# Patient Record
Sex: MALE | Race: WHITE | NOT HISPANIC OR LATINO | Employment: FULL TIME | URBAN - METROPOLITAN AREA
[De-identification: names, ages, dates, MRNs, and addresses within clinical notes are randomized per-mention and may not be internally consistent; named-entity substitution may affect disease eponyms.]

---

## 2017-01-13 ENCOUNTER — OFFICE VISIT (OUTPATIENT)
Dept: OPTOMETRY | Facility: CLINIC | Age: 42
End: 2017-01-13
Payer: OTHER GOVERNMENT

## 2017-01-13 DIAGNOSIS — H52.13 BILATERAL MYOPIA: Primary | ICD-10-CM

## 2017-01-13 PROCEDURE — 92015 DETERMINE REFRACTIVE STATE: CPT | Mod: ,,, | Performed by: OPTOMETRIST

## 2017-01-13 PROCEDURE — 99999 PR PBB SHADOW E&M-EST. PATIENT-LVL II: CPT | Mod: PBBFAC,,, | Performed by: OPTOMETRIST

## 2017-01-13 PROCEDURE — 92004 COMPRE OPH EXAM NEW PT 1/>: CPT | Mod: S$PBB,,, | Performed by: OPTOMETRIST

## 2017-01-13 PROCEDURE — 99212 OFFICE O/P EST SF 10 MIN: CPT | Mod: PBBFAC | Performed by: OPTOMETRIST

## 2017-01-13 NOTE — PROGRESS NOTES
HPI     Patient here today for comprehensive eye exam     Patient complains of being without his glasses for about 2 years, without   glasses pt has trouble with blurry distance vision, pt states no   complaints about his near vision.    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         Assessment /Plan     For exam results, see Encounter Report.    Bilateral myopia   New glasses prescription released, adaptation expected.    Eyeglass Final Rx     Eyeglass Final Rx      Sphere Cylinder Axis   Right -1.00 +1.00 090   Left -0.50 Sphere        Expiration Date:  1/14/2018                 RTC at pt's earliest convenience for DFE (continuation of 1/13/2017 exam) scheduled for 03/13/17

## 2017-01-13 NOTE — PATIENT INSTRUCTIONS
Thank you very much for coming in for your eye examination. It was a pleasure working with you today. Below is some information you might find helpful.     MYOPIA (NEAR-SIGHTEDNESS)     Myopia (near-sightedness), hyperopia (far-sightedness), and astigmatism (distorted vision) are known as refractive errors.     For proper eyesight, the cornea (the clear window in front of the eye) and the lens (behind the pupil) must properly focus or refract light onto the retina (at the back of the eye). If the length or shape of the eye is not ideal, the light may get focused too early or too late leaving a blurred image on the retina.    Myopia, or near-sightedness, is the ability to clearly see objects up close but not those at a distance.    Causes:  It is an inherited condition that usually starts in children between the ages of eight and twelve. Few factors outside of heredity affect this condition. Literacy is the main factor: in societies that do not read, myopia is very uncommon, while it is very common among those who read a lot. No one yet knows what there is about reading the leads to nearsightedness.  Using dim light, reading too much or nutritional deficiencies do not seem to impact it one way or the other.    Treatment:  Myopia is best treated with eyeglasses and contact lenses which compensate for the elongated shape of the eye allowing the light to focus properly on the retina. As children (and their eyes) grow through the teen years, the condition typically worsens and then levels off in adulthood. Recent research also indicates that the more time children spend outside, the less near-sighted they get. During this growing period, new eyeglasses may be needed as often as every six months to correct the problem.  Recent research indicates that the more time childrens spend outside, the less likely they are to develop myopia, and once it is there, time spent outside seems to slow its progression.    There is no  reliable way at this point to prevent myopia. There are a few things that help some people: we can use bifocals in adolescents to help reduce the increase in myopia. For Adults, it helps to look as far away as possible for just an instant, and to do this frequently when reading or using a computer.  The muscles of the eye have to work to see up close, and this helps reduce eyestrain which might be a factor in needing to change glasses prescription. *Refractive surgery* is available as a treatment for myopia but most medically trained eye doctors feel that eyes with simple myopia would best be treated with glasses or contact lenses.      Eliza Dang, OD

## 2017-01-13 NOTE — MR AVS SNAPSHOT
[facility]  [address]  [facility] [address]  Phone: [phone]                  [place]   2017 11:00 AM   Office Visit    Description:  Male : 1975   Provider:  Eliza Dang OD   Department:  [facility]           Reason for Visit     Eye Exam                To Do List           Goals (5 Years of Data)     None      [facility] On [facility]     [facility] On Call Nurse Care Line -  Assistance  Registered nurses in the [facility] Call Center provide clinical advisement, health education, appointment booking, and other advisory services.  Call for this free service at [phone].             Medications           Message regarding Medications     Verify the changes and/or additions to your medication regime listed below are the same as discussed with your clinician today.  If any of these changes or additions are incorrect, please notify your healthcare provider.        STOP taking these medications     ibuprofen (ADVIL,MOTRIN) 800 MG tablet Take 1 tablet (800 mg total) by mouth every 6 (six) hours as needed for Pain.           Verify that the below list of medications is an accurate representation of the medications you are currently taking.  If none reported, the list may be blank. If incorrect, please contact your healthcare provider. Carry this list with you in case of emergency.           Current Medications            Clinical Reference Information           Allergies as of 2017     No Known Allergies      Immunizations Administered on Date of Encounter - 2017     None      Instructions    Thank you very much for coming in for your eye examination. It was a pleasure working with you today. Below is some information you might find helpful.     MYOPIA (NEAR-SIGHTEDNESS)     Myopia (near-sightedness), hyperopia (far-sightedness), and astigmatism (distorted vision) are known as refractive errors.     For proper eyesight, the  cornea (the clear window in front of the eye) and the lens (behind the pupil) must properly focus or refract light onto the retina (at the back of the eye). If the length or shape of the eye is not ideal, the light may get focused too early or too late leaving a blurred image on the retina.    Myopia, or near-sightedness, is the ability to clearly see objects up close but not those at a distance.    Causes:  It is an inherited condition that usually starts in children between the ages of eight and twelve. Few factors outside of heredity affect this condition. Literacy is the main factor: in societies that do not read, myopia is very uncommon, while it is very common among those who read a lot. No one yet knows what there is about reading the leads to nearsightedness.  Using dim light, reading too much or nutritional deficiencies do not seem to impact it one way or the other.    Treatment:  Myopia is best treated with eyeglasses and contact lenses which compensate for the elongated shape of the eye allowing the light to focus properly on the retina. As children (and their eyes) grow through the teen years, the condition typically worsens and then levels off in adulthood. Recent research also indicates that the more time children spend outside, the less near-sighted they get. During this growing period, new eyeglasses may be needed as often as every six months to correct the problem.  Recent research indicates that the more time childrens spend outside, the less likely they are to develop myopia, and once it is there, time spent outside seems to slow its progression.    There is no reliable way at this point to prevent myopia. There are a few things that help some people: we can use bifocals in adolescents to help reduce the increase in myopia. For Adults, it helps to look as far away as possible for just an instant, and to do this frequently when reading or using a computer.  The muscles of the eye have to work to  see up close, and this helps reduce eyestrain which might be a factor in needing to change glasses prescription. *Refractive surgery* is available as a treatment for myopia but most medically trained eye doctors feel that eyes with simple myopia would best be treated with glasses or contact lenses.      Eliza Dang, OD        Smoking Cessation     If you would like to quit smoking:   You may be eligible for free services if you are a [place] resident and started smoking cigarettes before September 1, 1988.  Call the [employer] ([employer]) toll free at (826) 147-7518 or ([phone] 099-7207.   Call 7-508-QUIT-NOW if you do not meet the above criteria.

## 2017-03-15 ENCOUNTER — PATIENT MESSAGE (OUTPATIENT)
Dept: OPTOMETRY | Facility: CLINIC | Age: 42
End: 2017-03-15

## 2018-08-13 ENCOUNTER — CLINICAL SUPPORT (OUTPATIENT)
Dept: URGENT CARE | Facility: CLINIC | Age: 43
End: 2018-08-13

## 2018-08-13 DIAGNOSIS — Z02.83 ENCOUNTER FOR DRUG SCREENING: Primary | ICD-10-CM

## 2018-08-13 LAB
CTP QC/QA: YES
POC 5 PANEL DRUG SCREEN: NEGATIVE

## 2018-08-13 PROCEDURE — 80305 DRUG TEST PRSMV DIR OPT OBS: CPT | Mod: QW,S$GLB,, | Performed by: PREVENTIVE MEDICINE

## 2018-09-14 ENCOUNTER — HOSPITAL ENCOUNTER (EMERGENCY)
Facility: HOSPITAL | Age: 43
Discharge: HOME | End: 2018-09-14
Attending: EMERGENCY MEDICINE
Payer: OTHER GOVERNMENT

## 2018-09-14 VITALS
HEIGHT: 67 IN | WEIGHT: 180 LBS | DIASTOLIC BLOOD PRESSURE: 75 MMHG | TEMPERATURE: 98 F | SYSTOLIC BLOOD PRESSURE: 132 MMHG | BODY MASS INDEX: 28.25 KG/M2 | HEART RATE: 70 BPM | OXYGEN SATURATION: 99 % | RESPIRATION RATE: 16 BRPM

## 2018-09-14 DIAGNOSIS — S39.012A STRAIN OF LUMBAR REGION, INITIAL ENCOUNTER: Primary | ICD-10-CM

## 2018-09-14 PROCEDURE — 25000003 PHARM REV CODE 250: Performed by: PHYSICIAN ASSISTANT

## 2018-09-14 PROCEDURE — 99283 EMERGENCY DEPT VISIT LOW MDM: CPT

## 2018-09-14 RX ORDER — LIDOCAINE 50 MG/G
1 PATCH TOPICAL DAILY
Qty: 8 PATCH | Refills: 0 | Status: SHIPPED | OUTPATIENT
Start: 2018-09-14 | End: 2018-11-04

## 2018-09-14 RX ORDER — IBUPROFEN 600 MG/1
600 TABLET ORAL
Status: COMPLETED | OUTPATIENT
Start: 2018-09-14 | End: 2018-09-14

## 2018-09-14 RX ORDER — IBUPROFEN 600 MG/1
600 TABLET ORAL EVERY 6 HOURS PRN
Qty: 20 TABLET | Refills: 0 | Status: SHIPPED | OUTPATIENT
Start: 2018-09-14 | End: 2018-11-04

## 2018-09-14 RX ADMIN — IBUPROFEN 600 MG: 600 TABLET ORAL at 07:09

## 2018-09-14 NOTE — ED TRIAGE NOTES
Patient reports mid to lower back pain after slipping and falling last night.  Denies hitting head or LOC.

## 2018-09-14 NOTE — ED PROVIDER NOTES
Encounter Date: 9/14/2018       History     Chief Complaint   Patient presents with    Back Pain     Slipped last night injuring his middle and lower back.  Denies LOC     This is a 42-year-old male with no significant medical history who complains mid to lower back pain since last night after mechanical fall.  Patient was walking in his home when he slipped falling backwards landing on his lower back.  He denies hitting head or losing consciousness.  He reports pain primarily to the mid to lower back, and mostly on the left side.  The pain is characterized by an aching that is constant and nonradiating.  The pain is worse with movement.  He denies abdominal pain, lower extremity weakness or numbness, urinary or bowel retention/incontinence.  No history of osteoporosis or other bone disorders.          Review of patient's allergies indicates:  No Known Allergies  History reviewed. No pertinent past medical history.  Past Surgical History:   Procedure Laterality Date    CYST REMOVAL N/A     near heart     Family History   Problem Relation Age of Onset    Stroke Mother     Cancer Father     Diabetes Father     Hypertension Father     Cancer Maternal Grandmother     Cancer Maternal Grandfather     Cancer Paternal Grandmother     Cancer Paternal Grandfather      Social History     Tobacco Use    Smoking status: Current Every Day Smoker   Substance Use Topics    Alcohol use: No    Drug use: No     Review of Systems   Constitutional: Negative for fever.   HENT: Negative for sore throat.    Respiratory: Negative for shortness of breath.    Cardiovascular: Negative for chest pain.   Gastrointestinal: Negative for abdominal pain and nausea.   Genitourinary: Negative for dysuria.   Musculoskeletal: Positive for back pain.   Skin: Negative for rash.   Neurological: Negative for syncope, weakness and numbness.   Hematological: Does not bruise/bleed easily.       Physical Exam     Initial Vitals [09/14/18 0711]   BP  Pulse Resp Temp SpO2   132/75 70 16 98.3 °F (36.8 °C) 99 %      MAP       --         Physical Exam    Vitals reviewed.  Constitutional: He appears well-developed and well-nourished. He is not diaphoretic. No distress.   HENT:   Head: Normocephalic and atraumatic.   Right Ear: External ear normal.   Left Ear: External ear normal.   Nose: Nose normal.   Eyes: Conjunctivae are normal. No scleral icterus.   Neck: Normal range of motion. Neck supple.   Cardiovascular: Normal rate, regular rhythm and intact distal pulses.   Pulmonary/Chest: No respiratory distress.   Musculoskeletal: Normal range of motion. He exhibits tenderness.   Mild tenderness to the left paraspinal region of the lower thoracic and lumbar regions.  No midline tenderness, deformity, or step-offs.   Neurological: He is alert and oriented to person, place, and time. He has normal strength. No sensory deficit.   Skin: Skin is warm and dry. No rash noted. No erythema.         ED Course   Procedures  Labs Reviewed - No data to display       Imaging Results    None          Medical Decision Making:   ED Management:  42 year old patient presenting 2/2 back pain most consistent with musculoskeletal strain vs contusion from mechanical fall.    Low suspicion for acute cord compression or cauda equina at this time, given presentation and symptoms, including epidural abscess or hematoma. Patient has no history of malignancy, active or distant history.  Patient has no unexplained weight loss. No recent fevers, rigors, malaise, or recent infection.  No history of IVDU or skin-popping.  Patient does not have any history concerning for saddle anesthesia/perianal sensory loss or complaining of decreased rectal tone. Patient does not have urinary retention or inability to control urine from overflow.  Patient has no tenderness overlying spinous process. Patient has no focal weakness on examination. Distally neurovascuarly intact.  Discussed pain control, physical  therapy and follow up with PMD. Cautious return precautions discussed w/ full understanding                Attending Attestation:     Physician Attestation Statement for NP/PA:   I reviewed the chart but I did not personally examine the patient. The face to face encounter was performed by the NP/PA.                     Clinical Impression:   The encounter diagnosis was Strain of lumbar region, initial encounter.                             Jayy Landry PA-C  09/14/18 0731       David Crowell MD  10/18/18 1926

## 2018-11-04 ENCOUNTER — HOSPITAL ENCOUNTER (EMERGENCY)
Facility: HOSPITAL | Age: 43
Discharge: HOME | End: 2018-11-04
Attending: EMERGENCY MEDICINE
Payer: OTHER GOVERNMENT

## 2018-11-04 VITALS
WEIGHT: 180 LBS | DIASTOLIC BLOOD PRESSURE: 81 MMHG | SYSTOLIC BLOOD PRESSURE: 120 MMHG | TEMPERATURE: 98 F | RESPIRATION RATE: 11 BRPM | HEIGHT: 67 IN | BODY MASS INDEX: 28.25 KG/M2 | OXYGEN SATURATION: 100 % | HEART RATE: 64 BPM

## 2018-11-04 DIAGNOSIS — R07.89 CHEST WALL PAIN: Primary | ICD-10-CM

## 2018-11-04 DIAGNOSIS — R07.9 CHEST PAIN: ICD-10-CM

## 2018-11-04 LAB
ALBUMIN SERPL BCP-MCNC: 3.9 G/DL
ALP SERPL-CCNC: 65 U/L
ALT SERPL W/O P-5'-P-CCNC: 18 U/L
ANION GAP SERPL CALC-SCNC: 11 MMOL/L
AST SERPL-CCNC: 18 U/L
BASOPHILS # BLD AUTO: 0.03 K/UL
BASOPHILS NFR BLD: 0.3 %
BILIRUB SERPL-MCNC: 1 MG/DL
BNP SERPL-MCNC: <10 PG/ML
BUN SERPL-MCNC: 12 MG/DL
CALCIUM SERPL-MCNC: 9 MG/DL
CHLORIDE SERPL-SCNC: 107 MMOL/L
CO2 SERPL-SCNC: 21 MMOL/L
CREAT SERPL-MCNC: 0.9 MG/DL
DIFFERENTIAL METHOD: ABNORMAL
EOSINOPHIL # BLD AUTO: 0.2 K/UL
EOSINOPHIL NFR BLD: 1.7 %
ERYTHROCYTE [DISTWIDTH] IN BLOOD BY AUTOMATED COUNT: 13.8 %
EST. GFR  (AFRICAN AMERICAN): >60 ML/MIN/1.73 M^2
EST. GFR  (NON AFRICAN AMERICAN): >60 ML/MIN/1.73 M^2
GLUCOSE SERPL-MCNC: 86 MG/DL
HCT VFR BLD AUTO: 43.8 %
HGB BLD-MCNC: 15.5 G/DL
LYMPHOCYTES # BLD AUTO: 1.5 K/UL
LYMPHOCYTES NFR BLD: 15.8 %
MCH RBC QN AUTO: 31.5 PG
MCHC RBC AUTO-ENTMCNC: 35.4 G/DL
MCV RBC AUTO: 89 FL
MONOCYTES # BLD AUTO: 1.2 K/UL
MONOCYTES NFR BLD: 12.6 %
NEUTROPHILS # BLD AUTO: 6.7 K/UL
NEUTROPHILS NFR BLD: 69.4 %
PLATELET # BLD AUTO: 272 K/UL
PMV BLD AUTO: 10.2 FL
POTASSIUM SERPL-SCNC: 3.8 MMOL/L
PROT SERPL-MCNC: 6.4 G/DL
RBC # BLD AUTO: 4.92 M/UL
SODIUM SERPL-SCNC: 139 MMOL/L
TROPONIN I SERPL DL<=0.01 NG/ML-MCNC: <0.006 NG/ML
TROPONIN I SERPL DL<=0.01 NG/ML-MCNC: <0.006 NG/ML
WBC # BLD AUTO: 9.62 K/UL

## 2018-11-04 PROCEDURE — 93010 ELECTROCARDIOGRAM REPORT: CPT | Mod: ,,, | Performed by: INTERNAL MEDICINE

## 2018-11-04 PROCEDURE — 96374 THER/PROPH/DIAG INJ IV PUSH: CPT

## 2018-11-04 PROCEDURE — 83880 ASSAY OF NATRIURETIC PEPTIDE: CPT

## 2018-11-04 PROCEDURE — 99284 EMERGENCY DEPT VISIT MOD MDM: CPT | Mod: 25

## 2018-11-04 PROCEDURE — 96375 TX/PRO/DX INJ NEW DRUG ADDON: CPT

## 2018-11-04 PROCEDURE — 80053 COMPREHEN METABOLIC PANEL: CPT

## 2018-11-04 PROCEDURE — 84484 ASSAY OF TROPONIN QUANT: CPT

## 2018-11-04 PROCEDURE — 63600175 PHARM REV CODE 636 W HCPCS: Performed by: EMERGENCY MEDICINE

## 2018-11-04 PROCEDURE — 93005 ELECTROCARDIOGRAM TRACING: CPT

## 2018-11-04 PROCEDURE — 85025 COMPLETE CBC W/AUTO DIFF WBC: CPT

## 2018-11-04 RX ORDER — KETOROLAC TROMETHAMINE 30 MG/ML
15 INJECTION, SOLUTION INTRAMUSCULAR; INTRAVENOUS
Status: COMPLETED | OUTPATIENT
Start: 2018-11-04 | End: 2018-11-04

## 2018-11-04 RX ORDER — IBUPROFEN 600 MG/1
600 TABLET ORAL EVERY 6 HOURS PRN
Qty: 20 TABLET | Refills: 0 | Status: SHIPPED | OUTPATIENT
Start: 2018-11-04 | End: 2018-11-10

## 2018-11-04 RX ORDER — ASPIRIN 325 MG
325 TABLET ORAL
Status: DISCONTINUED | OUTPATIENT
Start: 2018-11-04 | End: 2018-11-05 | Stop reason: HOSPADM

## 2018-11-04 RX ADMIN — LORAZEPAM 0.5 MG: 2 INJECTION INTRAMUSCULAR; INTRAVENOUS at 10:11

## 2018-11-04 RX ADMIN — KETOROLAC TROMETHAMINE 15 MG: 30 INJECTION, SOLUTION INTRAMUSCULAR at 09:11

## 2018-11-05 NOTE — ED NOTES
"Prior to medication administration patient was asked :"How is your pain?". Patient stated "Worse after you pushed on it". Current pain level is 8/10.  "

## 2018-11-05 NOTE — ED NOTES
Patient reports having an increase in left sided chest pain. Patient appears anxious. Dr. Lefort notified. New orders received.

## 2018-11-05 NOTE — ED TRIAGE NOTES
Patient presents to the ED with reports of having chest pain while at work. Patient denies any fall or trauma. Reports pain is felt to the left sided chest area. Pain is able to be produced with palpation to the area. Denies any fever, cough, or congestion. Patient reported having an episode of shortness of breath yesterday that has resolved.     Review of patient's allergies indicates:  No Known Allergies     Patient has verified the spelling of their name and  on armband.   APPEARANCE: Patient is alert, calm, oriented x 4, and does not appear distressed.  SKIN: Skin is normal for race, warm, and dry. Normal skin turgor and mucous membranes moist.  CARDIAC: Normal rate and rhythm, no murmur heard. +Chest pain.  RESPIRATORY:Normal rate and effort. Breath sounds clear bilaterally throughout chest. Respirations are equal and unlabored.    GASTRO: Bowel sounds normal, abdomen is soft, no tenderness, and no abdominal distention.  MUSCLE: Full ROM. Tenderness to the left chest wall. No obvious deformity.  PERIPHERAL VASCULAR: peripheral pulses present. Normal cap refill. No edema. Warm to touch.  MENTAL STATUS: awake, alert and aware of environment.

## 2018-11-05 NOTE — ED PROVIDER NOTES
Encounter Date: 11/4/2018    SCRIBE #1 NOTE: I, Sowmya James, am scribing for, and in the presence of,  Dr. Lefort. I have scribed the entire note.       History     Chief Complaint   Patient presents with    Chest Pain     Sudden onset sharp, left sided chest pain x 1 hour with no h/o injury. Pain is reproducable with palpation. On arrival, awake, alert, oriented. States pain eased with NTG but continues painful with palpation.      A 43year-old male presents to the ED complaining of intermittent left anterior chest wall pain that onset suddenly at 1900. Patient was asymptomatic prior to onset, he denies any recent cough/cold symptoms, injury or trauma. Pain is exacerbated on movement and deep breathing. Symptoms relieved somewhat after NTG, but still reproducible on palpation. He denies any associated SOB, leg swelling, or past cardiac history or family history of cardiac problems. Patient has a history of daily tabacco use.             Review of patient's allergies indicates:  No Known Allergies  History reviewed. No pertinent past medical history.  Past Surgical History:   Procedure Laterality Date    CYST REMOVAL N/A     near heart     Family History   Problem Relation Age of Onset    Stroke Mother     Cancer Father     Diabetes Father     Hypertension Father     Cancer Maternal Grandmother     Cancer Maternal Grandfather     Cancer Paternal Grandmother     Cancer Paternal Grandfather      Social History     Tobacco Use    Smoking status: Current Every Day Smoker   Substance Use Topics    Alcohol use: No    Drug use: No     Review of Systems   Constitutional: Negative for chills and fever.   HENT: Negative for congestion, ear pain, rhinorrhea and sore throat.    Respiratory: Positive for chest tightness. Negative for cough, shortness of breath and wheezing.    Cardiovascular: Negative for chest pain and palpitations.   Gastrointestinal: Negative for abdominal pain, diarrhea, nausea and vomiting.    Genitourinary: Negative for dysuria and hematuria.   Musculoskeletal: Negative for back pain, myalgias and neck pain.   Skin: Negative for rash.   Neurological: Negative for dizziness, weakness, light-headedness and headaches.   Psychiatric/Behavioral: Negative for confusion.       Physical Exam     Initial Vitals [11/04/18 2032]   BP Pulse Resp Temp SpO2   110/74 70 16 97.9 °F (36.6 °C) 98 %      MAP       --         Physical Exam    Nursing note and vitals reviewed.  Constitutional: He appears well-developed and well-nourished. No distress.   HENT:   Head: Normocephalic and atraumatic.   Mouth/Throat: Oropharynx is clear and moist.   Eyes: EOM are normal. No foreign body present in the right eye. Right conjunctiva is injected.   Right medial scleral injection with limbic sparing   Cardiovascular: Normal rate, regular rhythm, normal heart sounds and intact distal pulses.   Pulmonary/Chest: Breath sounds normal. No respiratory distress.   Point tenderness along left lateral ribs    Abdominal: Soft. He exhibits no distension. There is no tenderness.   Musculoskeletal: Normal range of motion. He exhibits no edema or tenderness.   Neurological: He is alert and oriented to person, place, and time. He has normal strength.   Skin: Skin is warm and dry.         ED Course   Procedures  Labs Reviewed   CBC W/ AUTO DIFFERENTIAL - Abnormal; Notable for the following components:       Result Value    MCH 31.5 (*)     Mono # 1.2 (*)     Lymph% 15.8 (*)     All other components within normal limits   COMPREHENSIVE METABOLIC PANEL - Abnormal; Notable for the following components:    CO2 21 (*)     All other components within normal limits   TROPONIN I   B-TYPE NATRIURETIC PEPTIDE     EKG Readings: (Independently Interpreted)   Rhythm: Normal Sinus Rhythm. Heart Rate: 70. Ectopy: No Ectopy. Conduction: Normal. ST Segments: Normal ST Segments. T Waves: Normal. Clinical Impression: Normal Sinus Rhythm       X-Rays:   Independently  Interpreted Readings:   Other Readings:  Reviewed by myself, read by radiology.      Imaging Results          X-Ray Chest AP Portable (Final result)  Result time 11/04/18 21:22:56    Final result by Yony Aldana MD (11/04/18 21:22:56)                 Impression:      1. No acute cardiopulmonary process.      Electronically signed by: Yony Aldana MD  Date:    11/04/2018  Time:    21:22             Narrative:    EXAMINATION:  XR CHEST AP PORTABLE    CLINICAL HISTORY:  Chest Pain;    TECHNIQUE:  Single frontal view of the chest was performed.    COMPARISON:  05/29/2015, 10/31/2011    FINDINGS:  The cardiomediastinal silhouette is not enlarged.  There is no pleural effusion.  The trachea is midline.  The lungs are symmetrically expanded bilaterally without evidence of acute parenchymal process. No large focal consolidation seen.  There is no pneumothorax.  The osseous structures are unremarkable.                               Medical Decision Making:   Differential Diagnosis:   MI, CWP, PE, PTX, PNA, GERD  Clinical Tests:   Lab Tests: Ordered and Reviewed  Radiological Study: Reviewed and Ordered  Medical Tests: Ordered and Reviewed  ED Management:  Focal chest wall tenderness, severe to palpation.  Cardiac workup unremarkable. PERC met.  Discussed symptomatic care, expected course of injury, and return precautions.                      Clinical Impression:     1. Chest wall pain    2. Chest pain            Scribe Attestation I, Dr. Guy Lefort, personally performed the services described in this documentation. All medical record entries made by the scribe were at my direction and in my presence. I have reviewed the chart and agree that the record reflects my personal performance and is accurate and complete. Guy Lefort, MD.  5:10 AM 11/06/2018                    Guy J. Lefort, MD  11/06/18 0041

## 2018-11-10 ENCOUNTER — HOSPITAL ENCOUNTER (EMERGENCY)
Facility: HOSPITAL | Age: 43
Discharge: HOME | End: 2018-11-10
Attending: EMERGENCY MEDICINE
Payer: OTHER GOVERNMENT

## 2018-11-10 VITALS
OXYGEN SATURATION: 98 % | WEIGHT: 180 LBS | DIASTOLIC BLOOD PRESSURE: 74 MMHG | SYSTOLIC BLOOD PRESSURE: 111 MMHG | BODY MASS INDEX: 28.25 KG/M2 | RESPIRATION RATE: 19 BRPM | HEART RATE: 61 BPM | TEMPERATURE: 99 F | HEIGHT: 67 IN

## 2018-11-10 DIAGNOSIS — R10.13 EPIGASTRIC PAIN: Primary | ICD-10-CM

## 2018-11-10 DIAGNOSIS — R07.9 CHEST PAIN: ICD-10-CM

## 2018-11-10 LAB
ALBUMIN SERPL BCP-MCNC: 3.8 G/DL
ALP SERPL-CCNC: 59 U/L
ALT SERPL W/O P-5'-P-CCNC: 17 U/L
ANION GAP SERPL CALC-SCNC: 7 MMOL/L
AST SERPL-CCNC: 17 U/L
BASOPHILS # BLD AUTO: 0.03 K/UL
BASOPHILS NFR BLD: 0.4 %
BILIRUB SERPL-MCNC: 1.1 MG/DL
BUN SERPL-MCNC: 13 MG/DL
CALCIUM SERPL-MCNC: 9.3 MG/DL
CHLORIDE SERPL-SCNC: 106 MMOL/L
CO2 SERPL-SCNC: 24 MMOL/L
CREAT SERPL-MCNC: 0.8 MG/DL
DIFFERENTIAL METHOD: ABNORMAL
EOSINOPHIL # BLD AUTO: 0.3 K/UL
EOSINOPHIL NFR BLD: 3.7 %
ERYTHROCYTE [DISTWIDTH] IN BLOOD BY AUTOMATED COUNT: 13.8 %
EST. GFR  (AFRICAN AMERICAN): >60 ML/MIN/1.73 M^2
EST. GFR  (NON AFRICAN AMERICAN): >60 ML/MIN/1.73 M^2
GLUCOSE SERPL-MCNC: 87 MG/DL
HCT VFR BLD AUTO: 43.7 %
HGB BLD-MCNC: 15.9 G/DL
LIPASE SERPL-CCNC: 26 U/L
LYMPHOCYTES # BLD AUTO: 1.9 K/UL
LYMPHOCYTES NFR BLD: 26.3 %
MCH RBC QN AUTO: 32.2 PG
MCHC RBC AUTO-ENTMCNC: 36.4 G/DL
MCV RBC AUTO: 89 FL
MONOCYTES # BLD AUTO: 0.8 K/UL
MONOCYTES NFR BLD: 10.5 %
NEUTROPHILS # BLD AUTO: 4.3 K/UL
NEUTROPHILS NFR BLD: 59.1 %
PLATELET # BLD AUTO: 263 K/UL
PMV BLD AUTO: 11.1 FL
POTASSIUM SERPL-SCNC: 4 MMOL/L
PROT SERPL-MCNC: 6.5 G/DL
RBC # BLD AUTO: 4.94 M/UL
SODIUM SERPL-SCNC: 137 MMOL/L
TROPONIN I SERPL DL<=0.01 NG/ML-MCNC: <0.006 NG/ML
WBC # BLD AUTO: 7.33 K/UL

## 2018-11-10 PROCEDURE — 80053 COMPREHEN METABOLIC PANEL: CPT

## 2018-11-10 PROCEDURE — 93010 ELECTROCARDIOGRAM REPORT: CPT | Mod: ,,, | Performed by: INTERNAL MEDICINE

## 2018-11-10 PROCEDURE — 83690 ASSAY OF LIPASE: CPT

## 2018-11-10 PROCEDURE — 63600175 PHARM REV CODE 636 W HCPCS: Performed by: EMERGENCY MEDICINE

## 2018-11-10 PROCEDURE — 93005 ELECTROCARDIOGRAM TRACING: CPT

## 2018-11-10 PROCEDURE — 99285 EMERGENCY DEPT VISIT HI MDM: CPT | Mod: 25

## 2018-11-10 PROCEDURE — 96375 TX/PRO/DX INJ NEW DRUG ADDON: CPT

## 2018-11-10 PROCEDURE — 85025 COMPLETE CBC W/AUTO DIFF WBC: CPT

## 2018-11-10 PROCEDURE — 25000003 PHARM REV CODE 250: Performed by: EMERGENCY MEDICINE

## 2018-11-10 PROCEDURE — 96374 THER/PROPH/DIAG INJ IV PUSH: CPT

## 2018-11-10 PROCEDURE — 84484 ASSAY OF TROPONIN QUANT: CPT

## 2018-11-10 RX ORDER — MIDAZOLAM HYDROCHLORIDE 1 MG/ML
4 INJECTION INTRAMUSCULAR; INTRAVENOUS
Status: DISCONTINUED | OUTPATIENT
Start: 2018-11-10 | End: 2018-11-10

## 2018-11-10 RX ORDER — FAMOTIDINE 20 MG/1
20 TABLET, FILM COATED ORAL 2 TIMES DAILY
Qty: 60 TABLET | Refills: 0 | Status: SHIPPED | OUTPATIENT
Start: 2018-11-10 | End: 2018-11-12 | Stop reason: DRUGHIGH

## 2018-11-10 RX ORDER — MORPHINE SULFATE 4 MG/ML
5 INJECTION, SOLUTION INTRAMUSCULAR; INTRAVENOUS
Status: COMPLETED | OUTPATIENT
Start: 2018-11-10 | End: 2018-11-10

## 2018-11-10 RX ORDER — ONDANSETRON 2 MG/ML
4 INJECTION INTRAMUSCULAR; INTRAVENOUS
Status: COMPLETED | OUTPATIENT
Start: 2018-11-10 | End: 2018-11-10

## 2018-11-10 RX ORDER — ONDANSETRON 4 MG/1
4 TABLET, FILM COATED ORAL EVERY 6 HOURS
Qty: 12 TABLET | Refills: 0 | Status: SHIPPED | OUTPATIENT
Start: 2018-11-10 | End: 2019-01-07 | Stop reason: ALTCHOICE

## 2018-11-10 RX ORDER — ALUMINUM HYDROXIDE, MAGNESIUM HYDROXIDE, AND SIMETHICONE 2400; 240; 2400 MG/30ML; MG/30ML; MG/30ML
10 SUSPENSION ORAL EVERY 6 HOURS PRN
Qty: 335 ML | Refills: 1 | Status: SHIPPED | OUTPATIENT
Start: 2018-11-10 | End: 2018-11-12 | Stop reason: DRUGHIGH

## 2018-11-10 RX ADMIN — ONDANSETRON 4 MG: 2 INJECTION INTRAMUSCULAR; INTRAVENOUS at 04:11

## 2018-11-10 RX ADMIN — MORPHINE SULFATE 5 MG: 4 INJECTION, SOLUTION INTRAMUSCULAR; INTRAVENOUS at 05:11

## 2018-11-10 RX ADMIN — LIDOCAINE HYDROCHLORIDE: 20 SOLUTION ORAL; TOPICAL at 04:11

## 2018-11-10 NOTE — ED PROVIDER NOTES
"Encounter Date: 11/10/2018    SCRIBE #1 NOTE: I, Alena Santo, am scribing for, and in the presence of,  Prosper Negrete Jr., MD. I have scribed the following portions of the note - Other sections scribed: HPI and ROS.       History     Chief Complaint   Patient presents with    Chest Pain     x5 days. went to urgent care and was sent to this ER for "elevation" on EKG    Abdominal Pain     epigastric abd pain      CC: Chest Pain    HPI: This 43 y.o male presents to the ED for an evaluation of acute onset, intermittent chest pain for the past several days.  Patient also reports of periumbilical abdominal pain for the past 2-3 days.  He reports having a cough and intermittent shortness of breath.  Patient denies emesis, diarrhea, blood in diarrhea, constipation, or any other associated symptoms.  No prior tx.  No alleviating factors.  He was sent to this ED after being evaluated at Urgent Care for an abnormal EKG.        The history is provided by the patient. No  was used.     Review of patient's allergies indicates:  No Known Allergies  History reviewed. No pertinent past medical history.  Past Surgical History:   Procedure Laterality Date    CYST REMOVAL N/A     near heart     Family History   Problem Relation Age of Onset    Stroke Mother     Cancer Father     Diabetes Father     Hypertension Father     Cancer Maternal Grandmother     Cancer Maternal Grandfather     Cancer Paternal Grandmother     Cancer Paternal Grandfather      Social History     Tobacco Use    Smoking status: Current Every Day Smoker   Substance Use Topics    Alcohol use: No    Drug use: Yes     Frequency: 3.0 times per week     Types: Marijuana     Review of Systems   Constitutional: Negative for chills and fever.   HENT: Negative for ear pain and sore throat.    Eyes: Negative for pain.   Respiratory: Positive for cough and shortness of breath.    Cardiovascular: Positive for chest pain.   Gastrointestinal: " Positive for abdominal pain and nausea. Negative for blood in stool, constipation, diarrhea and vomiting.   Genitourinary: Negative for dysuria.   Musculoskeletal: Negative for back pain.        (-) arm or leg problems   Skin: Negative for rash.   Neurological: Negative for headaches.       Physical Exam     Initial Vitals   BP Pulse Resp Temp SpO2   11/10/18 1555 11/10/18 1602 11/10/18 1602 11/10/18 1602 11/10/18 1602   132/82 64 18 98.5 °F (36.9 °C) 98 %      MAP       --                Physical Exam    Nursing note and vitals reviewed.  Constitutional: He appears well-developed and well-nourished. He is not diaphoretic. No distress.   HENT:   Head: Normocephalic and atraumatic.   Right Ear: External ear normal.   Left Ear: External ear normal.   Nose: Nose normal.   Mouth/Throat: Oropharynx is clear and moist.   Eyes: Conjunctivae and EOM are normal. Pupils are equal, round, and reactive to light. Right eye exhibits no discharge. Left eye exhibits no discharge. No scleral icterus.   Neck: Normal range of motion. Neck supple. No JVD present.   Cardiovascular: Normal rate, regular rhythm, normal heart sounds and intact distal pulses. Exam reveals no gallop and no friction rub.    No murmur heard.  Pulmonary/Chest: Breath sounds normal. No stridor. No respiratory distress. He has no wheezes. He has no rhonchi. He has no rales. He exhibits no tenderness.   Abdominal: Soft. Bowel sounds are normal. He exhibits no distension and no mass. There is no tenderness. There is no rebound and no guarding.   Mild discomfort in the epigastrium only.  No right upper quadrant or any lower abdominal tenderness.  Adams's sign is negative.   Musculoskeletal: Normal range of motion. He exhibits no edema or tenderness.   Neurological: He is alert and oriented to person, place, and time. He has normal strength. No cranial nerve deficit or sensory deficit.   Skin: Skin is warm and dry. No rash noted. No erythema. No pallor.    Psychiatric: He has a normal mood and affect. His behavior is normal. Judgment and thought content normal.         ED Course   Procedures  Labs Reviewed   CBC W/ AUTO DIFFERENTIAL - Abnormal; Notable for the following components:       Result Value    MCH 32.2 (*)     MCHC 36.4 (*)     All other components within normal limits   COMPREHENSIVE METABOLIC PANEL - Abnormal; Notable for the following components:    Total Bilirubin 1.1 (*)     Anion Gap 7 (*)     All other components within normal limits   LIPASE   TROPONIN I     EKG Readings: (Independently Interpreted)   Initial Reading: No STEMI.   EKG reviewed and interpreted by me shows sinus rhythm at a rate of 64.  P.r., QRS, QT intervals within normal limits.  There is normal axis.  There is normal R-wave progression.  There is early repolarization/J-point elevation in the inferior and lateral leads.       Imaging Results          X-Ray Chest PA And Lateral (Final result)  Result time 11/10/18 16:45:25    Final result by Kushal Gabriel MD (11/10/18 16:45:25)                 Impression:      No acute findings.      Electronically signed by: Kushal Gabriel MD  Date:    11/10/2018  Time:    16:45             Narrative:    EXAMINATION:  XR CHEST PA AND LATERAL    CLINICAL HISTORY:  Chest pain, unspecified    TECHNIQUE:  PA and lateral views of the chest were performed.    COMPARISON:  11/04/2018    FINDINGS:  The cardiac and mediastinal silhouettes appear within normal limits.   The lungs are clear bilaterally.  No acute osseous findings demonstrated.                                 Medical Decision Making:   ED Management:  This is the emergent evaluation of a 43-year-old male presents to the emergency department for evaluation of epigastric pain with radiation to the left upper quadrant as well as nausea for the past several days.  Patient also complains of intermittent chest discomfort.  Patient was sent here from urgent care due to concern about ST  elevation findings in his EKG.  Differential diagnosis at the time of initial evaluation included, but was not limited to:  Gastritis, peptic ulcer disease, acute pancreatitis, pericarditis, myocarditis, pneumonia, pneumothorax.      Chest x-ray, EKG, and laboratory evaluation were all reassuring.  I believe that the concerned about ST elevation was actually early repolarization and J-point elevation.  I do not suspect acute myocardial infarction or acute coronary syndrome.  I also do not suspect is perforated ulcer, gallbladder disease, or pancreatitis.  This patient is safe and stable for discharge with symptomatic treatment of what is likely gastritis versus early peptic ulcer disease.  He is advised to follow up with his PCP within 1 week and return for any new or worsening symptoms.            Scribe Attestation:   Scribe #1: I performed the above scribed service and the documentation accurately describes the services I performed. I attest to the accuracy of the note.    Attending Attestation:           Physician Attestation for Scribe:  Physician Attestation Statement for Scribe #1: I, Prosper Negrete Jr., MD, reviewed documentation, as scribed by Alena Santo in my presence, and it is both accurate and complete.                    Clinical Impression:   The primary encounter diagnosis was Epigastric pain. A diagnosis of Chest pain was also pertinent to this visit.                             Prosper Negrete Jr., MD  11/10/18 2017

## 2018-11-10 NOTE — ED TRIAGE NOTES
Pt presents to ED today via EMS with complaints of chest pain and abdominal pain.  Pt states that he went to urgent care for abdominal pain, where they did an ecg, noted some abnormalities and sent him to the ED.  Pt is alert and oriented x4, VSS pt appears to be in no acute distress at this time .

## 2018-11-10 NOTE — DISCHARGE INSTRUCTIONS
Please go to your regular appointment as scheduled with your PCP.  Please return for any new or worsening symptoms such as black or bloody stools, intractable worsening of abdominal pain, intractable vomiting, black or bloody vomitus.  Please take medications as prescribed.

## 2018-11-11 ENCOUNTER — HOSPITAL ENCOUNTER (EMERGENCY)
Facility: HOSPITAL | Age: 43
Discharge: HOME | End: 2018-11-12
Attending: EMERGENCY MEDICINE
Payer: OTHER GOVERNMENT

## 2018-11-11 VITALS
RESPIRATION RATE: 17 BRPM | OXYGEN SATURATION: 94 % | TEMPERATURE: 98 F | DIASTOLIC BLOOD PRESSURE: 73 MMHG | SYSTOLIC BLOOD PRESSURE: 115 MMHG | BODY MASS INDEX: 28.25 KG/M2 | HEART RATE: 75 BPM | WEIGHT: 180 LBS | HEIGHT: 67 IN

## 2018-11-11 DIAGNOSIS — R07.9 CHEST PAIN: Primary | ICD-10-CM

## 2018-11-11 DIAGNOSIS — R10.9 ABDOMINAL PAIN, UNSPECIFIED ABDOMINAL LOCATION: ICD-10-CM

## 2018-11-11 DIAGNOSIS — R07.89 CHEST WALL PAIN: ICD-10-CM

## 2018-11-11 LAB
ALBUMIN SERPL BCP-MCNC: 3.9 G/DL
ALP SERPL-CCNC: 59 U/L
ALT SERPL W/O P-5'-P-CCNC: 18 U/L
AMPHET+METHAMPHET UR QL: NEGATIVE
ANION GAP SERPL CALC-SCNC: 9 MMOL/L
AST SERPL-CCNC: 19 U/L
BARBITURATES UR QL SCN>200 NG/ML: NEGATIVE
BASOPHILS # BLD AUTO: 0.03 K/UL
BASOPHILS NFR BLD: 0.4 %
BENZODIAZ UR QL SCN>200 NG/ML: NEGATIVE
BILIRUB SERPL-MCNC: 0.6 MG/DL
BILIRUB UR QL STRIP: NEGATIVE
BUN SERPL-MCNC: 16 MG/DL
BZE UR QL SCN: NEGATIVE
CALCIUM SERPL-MCNC: 9.3 MG/DL
CANNABINOIDS UR QL SCN: NORMAL
CHLORIDE SERPL-SCNC: 107 MMOL/L
CLARITY UR: CLEAR
CO2 SERPL-SCNC: 23 MMOL/L
COLOR UR: YELLOW
CREAT SERPL-MCNC: 1 MG/DL
CREAT UR-MCNC: 210.3 MG/DL
D DIMER PPP IA.FEU-MCNC: <0.19 MG/L FEU
DIFFERENTIAL METHOD: ABNORMAL
EOSINOPHIL # BLD AUTO: 0.4 K/UL
EOSINOPHIL NFR BLD: 5.4 %
ERYTHROCYTE [DISTWIDTH] IN BLOOD BY AUTOMATED COUNT: 13.9 %
EST. GFR  (AFRICAN AMERICAN): >60 ML/MIN/1.73 M^2
EST. GFR  (NON AFRICAN AMERICAN): >60 ML/MIN/1.73 M^2
ETHANOL SERPL-MCNC: <10 MG/DL
GLUCOSE SERPL-MCNC: 95 MG/DL
GLUCOSE UR QL STRIP: NEGATIVE
HCT VFR BLD AUTO: 44.3 %
HGB BLD-MCNC: 15.9 G/DL
HGB UR QL STRIP: NEGATIVE
KETONES UR QL STRIP: NEGATIVE
LEUKOCYTE ESTERASE UR QL STRIP: NEGATIVE
LIPASE SERPL-CCNC: 49 U/L
LYMPHOCYTES # BLD AUTO: 2 K/UL
LYMPHOCYTES NFR BLD: 25.2 %
MCH RBC QN AUTO: 31.7 PG
MCHC RBC AUTO-ENTMCNC: 35.9 G/DL
MCV RBC AUTO: 88 FL
METHADONE UR QL SCN>300 NG/ML: NEGATIVE
MONOCYTES # BLD AUTO: 1 K/UL
MONOCYTES NFR BLD: 12.5 %
NEUTROPHILS # BLD AUTO: 4.6 K/UL
NEUTROPHILS NFR BLD: 56.5 %
NITRITE UR QL STRIP: NEGATIVE
OPIATES UR QL SCN: NORMAL
PCP UR QL SCN>25 NG/ML: NEGATIVE
PH UR STRIP: 6 [PH] (ref 5–8)
PLATELET # BLD AUTO: 267 K/UL
PMV BLD AUTO: 11.5 FL
POTASSIUM SERPL-SCNC: 3.9 MMOL/L
PROT SERPL-MCNC: 6.7 G/DL
PROT UR QL STRIP: NEGATIVE
RBC # BLD AUTO: 5.02 M/UL
SODIUM SERPL-SCNC: 139 MMOL/L
SP GR UR STRIP: 1.02 (ref 1–1.03)
TOXICOLOGY INFORMATION: NORMAL
TROPONIN I SERPL DL<=0.01 NG/ML-MCNC: <0.006 NG/ML
URN SPEC COLLECT METH UR: NORMAL
UROBILINOGEN UR STRIP-ACNC: NEGATIVE EU/DL
WBC # BLD AUTO: 8.11 K/UL

## 2018-11-11 PROCEDURE — 93005 ELECTROCARDIOGRAM TRACING: CPT

## 2018-11-11 PROCEDURE — 93010 ELECTROCARDIOGRAM REPORT: CPT | Mod: ,,, | Performed by: INTERNAL MEDICINE

## 2018-11-11 PROCEDURE — 25000003 PHARM REV CODE 250: Performed by: EMERGENCY MEDICINE

## 2018-11-11 PROCEDURE — 85379 FIBRIN DEGRADATION QUANT: CPT

## 2018-11-11 PROCEDURE — 96361 HYDRATE IV INFUSION ADD-ON: CPT

## 2018-11-11 PROCEDURE — 84484 ASSAY OF TROPONIN QUANT: CPT

## 2018-11-11 PROCEDURE — 80053 COMPREHEN METABOLIC PANEL: CPT

## 2018-11-11 PROCEDURE — 96374 THER/PROPH/DIAG INJ IV PUSH: CPT

## 2018-11-11 PROCEDURE — 81003 URINALYSIS AUTO W/O SCOPE: CPT | Mod: 59

## 2018-11-11 PROCEDURE — 83690 ASSAY OF LIPASE: CPT

## 2018-11-11 PROCEDURE — 63600175 PHARM REV CODE 636 W HCPCS: Performed by: EMERGENCY MEDICINE

## 2018-11-11 PROCEDURE — 99285 EMERGENCY DEPT VISIT HI MDM: CPT | Mod: 25

## 2018-11-11 PROCEDURE — 80307 DRUG TEST PRSMV CHEM ANLYZR: CPT

## 2018-11-11 PROCEDURE — 80320 DRUG SCREEN QUANTALCOHOLS: CPT

## 2018-11-11 PROCEDURE — 85025 COMPLETE CBC W/AUTO DIFF WBC: CPT

## 2018-11-11 PROCEDURE — 86703 HIV-1/HIV-2 1 RESULT ANTBDY: CPT

## 2018-11-11 RX ORDER — OXYCODONE AND ACETAMINOPHEN 10; 325 MG/1; MG/1
1 TABLET ORAL EVERY 4 HOURS PRN
Qty: 20 TABLET | Refills: 0 | Status: SHIPPED | OUTPATIENT
Start: 2018-11-11 | End: 2019-01-07 | Stop reason: ALTCHOICE

## 2018-11-11 RX ORDER — VALACYCLOVIR HYDROCHLORIDE 500 MG/1
1000 TABLET, FILM COATED ORAL
Status: COMPLETED | OUTPATIENT
Start: 2018-11-11 | End: 2018-11-12

## 2018-11-11 RX ORDER — MORPHINE SULFATE 4 MG/ML
8 INJECTION, SOLUTION INTRAMUSCULAR; INTRAVENOUS
Status: COMPLETED | OUTPATIENT
Start: 2018-11-11 | End: 2018-11-11

## 2018-11-11 RX ORDER — ONDANSETRON 2 MG/ML
4 INJECTION INTRAMUSCULAR; INTRAVENOUS
Status: COMPLETED | OUTPATIENT
Start: 2018-11-11 | End: 2018-11-11

## 2018-11-11 RX ORDER — KETOROLAC TROMETHAMINE 10 MG/1
10 TABLET, FILM COATED ORAL EVERY 6 HOURS PRN
Qty: 20 TABLET | Refills: 1 | Status: SHIPPED | OUTPATIENT
Start: 2018-11-11 | End: 2019-01-07 | Stop reason: ALTCHOICE

## 2018-11-11 RX ORDER — IBUPROFEN 400 MG/1
800 TABLET ORAL
Status: COMPLETED | OUTPATIENT
Start: 2018-11-11 | End: 2018-11-11

## 2018-11-11 RX ORDER — VALACYCLOVIR HYDROCHLORIDE 1 G/1
1000 TABLET, FILM COATED ORAL 3 TIMES DAILY
Qty: 21 TABLET | Refills: 0 | Status: SHIPPED | OUTPATIENT
Start: 2018-11-11 | End: 2019-01-07 | Stop reason: ALTCHOICE

## 2018-11-11 RX ORDER — OXYCODONE AND ACETAMINOPHEN 10; 325 MG/1; MG/1
1 TABLET ORAL
Status: COMPLETED | OUTPATIENT
Start: 2018-11-11 | End: 2018-11-12

## 2018-11-11 RX ADMIN — IBUPROFEN 800 MG: 400 TABLET ORAL at 08:11

## 2018-11-11 RX ADMIN — SODIUM CHLORIDE 1000 ML: 0.9 INJECTION, SOLUTION INTRAVENOUS at 09:11

## 2018-11-11 RX ADMIN — MORPHINE SULFATE 8 MG: 4 INJECTION, SOLUTION INTRAMUSCULAR; INTRAVENOUS at 09:11

## 2018-11-11 RX ADMIN — ONDANSETRON 4 MG: 2 INJECTION INTRAMUSCULAR; INTRAVENOUS at 09:11

## 2018-11-11 NOTE — PROGRESS NOTES
Subjective:       Patient ID: Donell Park is a 43 y.o. male who  has a past medical history of Heavy smoker (more than 20 cigarettes per day) and Shingles (2013).    Chief Complaint: Hospital Follow Up; Herpes Zoster; Establish Care; Emesis; and Chest Pain    History was obtained from the patient and supplemented through chart review and from his wife who accompanied the patient.    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  Denies occupational exposure.  The ship transports animal feeds and grains.    The patient has not seen a PCP in our system.    He reports left-sided chest pain since 11/04/2018.  At that time, he was waiting to get on a ship when he experienced sudden and severe left-sided chest pain. He then felt a little lightheaded with mild shortness of breath.  The pain was sharp, stabbing and tender to palpation.  He did not take ibuprofen or try anything OTC.  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  Denies pleuritic chest pain. Denies heavy lifting.  Given the severity, his coworkers sent him to the ED via ambulance.  He was seen in the ED on 11/4/18 for sharp L sided CP; he had point TTP at the left lateral ribs. Trop neg. EKG without ST changes.  CXR without acute findings.  Was discharged with ibuprofen 600 for costochondritis, but he never took it.     It gradually improved while he was in the ED, but then returned the next day upon awaking.  He just tried to push through it and has been off of work in the meantime.  The pain is present all day but it waxes and wanes in intensity and is tolerable.  It is worsened by recumbency.  He cannot identify other aggravating or alleviating factors.  He has had decreased appetite and fatigue since then.  He usually sleeps 4-5 hours a night and even takes 15-20 mg of melatonin but lately he has not required it.  It can be worsened with exertion, which causes lightheadedness    About 4-5 days ago, he also noted periumbilical pain that wrapped  around his left side towards his back.  He has difficulty characterizing this pain. Denies change in bowel habits or diarrhea.  Has had chills but no fever.  Denies alcohol use.  There is no relation to food. Denies dysuria or hematuria.  He denied rashes.  The pain progressively worsened, lasting longer in duration and was more severe, prompting him to go to urgent care on 11/10/2018.  He was sent to the ED from there due to elevation on his EKG that may have been early repolarization.  There was mild discomfort at the periumbilical area on exam.  Lipase neg; no transamintis, elevated alk phos; mildly elevated T bili.  Trop neg.  CXR without acute abnormality.  EKG without ST changes.  He was discharged with Pepcid, Zofran and Maalox for possibly gastritis or PUD, which he has not filled.    He continued to have severe pain again at the left chest and periumbilical pain with radiation to the left side and back.  He presented to the ED again yesterday.  The chest pain has been progressively worsening.  He again had tenderness palpation at the left lateral area.  Chest x-ray was again negative.  EKG was 1 isolated T-wave inversion.  Otherwise no ST changes.  He was discharged with   Valtrex 1000 and Percocet 10 for possible shingles.  He denies rash.  He has had history of shingles that might of been at his upper mid back; the pain was much worse back then and he only had 1 vesicle.  He denies significant stress or illness.  He has felt a little better last night since taking the Valtrex.    Of note, he has a history of a cyst that was near the heart.  At that time he has shortness of breath.  It was removed in about 2013 at [facility].  It was discovered through CT scan.    Tobacco use:  Smokes 1 ppd since 16 YOA.  He has no interest in quitting.  27 pack year history.  He has had several family members with lung cancer.    Abdominal Pain   This is a new problem. The current episode started in the past 7 days. The onset  quality is sudden. The problem occurs constantly. The most recent episode lasted 2 hours. The problem has been waxing and waning. The pain is located in the LLQ and periumbilical region. The pain is at a severity of 3/10. The pain is moderate. The quality of the pain is sharp. The abdominal pain radiates to the LUQ. Associated symptoms include anorexia, belching and constipation. Pertinent negatives include no arthralgias, diarrhea, dysuria, fever, flatus, frequency, headaches, hematochezia, hematuria, melena, myalgias, nausea, vomiting or weight loss. The pain is aggravated by certain positions. The pain is relieved by being still. He has tried nothing for the symptoms. The treatment provided no relief. There is no history of abdominal surgery, colon cancer, Crohn's disease, gallstones, GERD, irritable bowel syndrome, pancreatitis, PUD or ulcerative colitis. Patient's medical history does not include kidney stones and UTI.       Review of Systems   Constitutional: Positive for activity change. Negative for fever and weight loss.   HENT: Negative for rhinorrhea and sneezing.    Eyes: Negative for redness and itching.   Respiratory: Negative for shortness of breath and wheezing.    Cardiovascular: Positive for chest pain. Negative for palpitations.   Gastrointestinal: Positive for abdominal pain, anorexia and constipation. Negative for diarrhea, flatus, hematochezia, melena, nausea and vomiting.   Genitourinary: Negative for dysuria, frequency and hematuria.   Musculoskeletal: Negative for arthralgias and myalgias.   Skin: Negative for rash and wound.   Neurological: Positive for light-headedness. Negative for headaches.   Hematological: Negative for adenopathy.   Psychiatric/Behavioral: Negative for self-injury and suicidal ideas.       Past Medical History:   Diagnosis Date    Heavy smoker (more than 20 cigarettes per day)     Shingles 2013     Past Surgical History:   Procedure Laterality Date    CYST REMOVAL  "N/A 2013    near heart    GANGLION CYST EXCISION Right     wrist     Family History   Problem Relation Age of Onset    Stroke Mother     Diabetes Father     Hypertension Father     Lung cancer Father     Lung cancer Maternal Grandmother     Lung cancer Maternal Grandfather     Lung cancer Paternal Grandmother     Lung cancer Paternal Grandfather     Lung cancer Maternal Uncle     Lung cancer Maternal Uncle      Social History     Socioeconomic History    Marital status:      Spouse name: Not on file    Number of children: Not on file    Years of education: Not on file    Highest education level: Not on file   Social Needs    Financial resource strain: Not on file    Food insecurity - worry: Not on file    Food insecurity - inability: Not on file    Transportation needs - medical: Not on file    Transportation needs - non-medical: Not on file   Occupational History    Not on file   Tobacco Use    Smoking status: Current Every Day Smoker     Packs/day: 1.00     Start date: 1997   Substance and Sexual Activity    Alcohol use: No    Drug use: Yes     Frequency: 3.0 times per week     Types: Marijuana    Sexual activity: Yes     Partners: Female   Other Topics Concern    Not on file   Social History Narrative    Not on file     Objective:      Vitals:    11/12/18 0956   BP: 124/70   Pulse: 103   SpO2: (!) 93%   Weight: 87.8 kg (193 lb 9 oz)   Height: 5' 7" (1.702 m)      Physical Exam   Constitutional: He appears well-developed and well-nourished. No distress.   HENT:   Head: Normocephalic and atraumatic.   Nose: Nose normal.   Mouth/Throat: Oropharynx is clear and moist. No oropharyngeal exudate.   Eyes: Conjunctivae and EOM are normal. Pupils are equal, round, and reactive to light. Right eye exhibits no discharge. Left eye exhibits no discharge. No scleral icterus.   Neck: Neck supple. No tracheal deviation present. No thyromegaly present.   Cardiovascular: Normal rate, regular " rhythm, normal heart sounds and intact distal pulses. Exam reveals no friction rub.   No murmur heard.  Pulmonary/Chest: Effort normal and breath sounds normal. No respiratory distress. He has no wheezes. He exhibits tenderness.   TTP at left lateral ribs   Abdominal: Soft. Bowel sounds are normal. There is tenderness. There is guarding. There is no rebound.   Periumbilical pain with voluntary guarding.  Negative Adams's.   Musculoskeletal: He exhibits no edema or deformity.   Lymphadenopathy:     He has no cervical adenopathy.   Neurological: He is alert. No cranial nerve deficit. Gait normal.   Skin: Skin is warm and dry. Capillary refill takes less than 2 seconds. He is not diaphoretic. No erythema.   No rash or vesicles on trunk, abdomen, back   Psychiatric: He has a normal mood and affect. His behavior is normal.         Lab Results   Component Value Date    WBC 8.11 11/11/2018    HGB 15.9 11/11/2018    HCT 44.3 11/11/2018     11/11/2018    CHOL 181 11/07/2009    TRIG 104 11/07/2009    HDL 40 11/07/2009    ALT 18 11/11/2018    AST 19 11/11/2018     11/11/2018    K 3.9 11/11/2018     11/11/2018    CREATININE 1.0 11/11/2018    BUN 16 11/11/2018    CO2 23 11/11/2018    TSH 2.48 06/03/2010    INR 0.9 05/22/2008       The ASCVD Risk score (Jocelin LILLIE Jr., et al., 2013) failed to calculate for the following reasons:    Cannot find a previous HDL lab    Cannot find a previous total cholesterol lab    (Imaging have been independently reviewed)  Multiple chest x-rays without acute process    Assessment:       1. Chest pain, unspecified type    2. Healthcare maintenance    3. Herpes zoster without complication    4. Insomnia, unspecified type    5. Heavy smoker (more than 20 cigarettes per day)    6. Periumbilical pain          Plan:       Donell was seen today for hospital follow up, herpes zoster, establish care, emesis and chest pain.    Diagnoses and all orders for this visit:    Chest pain,  unspecified type  Comments:  Concern for pericarditis given h/o of cyst excision near his heart, worsening with recumbency.  Multiple ACS workup neg.  Discussed ED return precautions  Orders:  -     Exercise stress echo; Future  -     Transthoracic echo (TTE) complete (Cupid Only); Future  -     Ambulatory Referral to Cardiology  -     HIV-1 and HIV-2 antibodies; Future  -     Sedimentation rate; Future  -     C-reactive protein; Future  -     ibuprofen (ADVIL,MOTRIN) 600 MG tablet; Take 1 tablet (600 mg total) by mouth 3 (three) times daily.  -     colchicine (COLCRYS) 0.6 mg tablet; Take 1 tablet (0.6 mg total) by mouth once daily.  -     Echocardiogram stress test (Cupid Only); Future    Healthcare maintenance  Comments:  Will assess vaccinations status during well visit  Orders:  -     Lipid panel; Future  -     Hemoglobin A1c; Future  -     TSH; Future    Herpes zoster without complication  Comments:  h/o shingles. Periumbilical pain with radiation to L back. Given neg lipase, LFTs, T bili, ETOH use or relation to food, reasonable to try Valtrex for ?prodrome    Insomnia, unspecified type  -     Ambulatory consult to Sleep Disorders    Heavy smoker (more than 20 cigarettes per day)  Comments:  Counseled on tobacco cessation for 5 min.  He is not interested in quitting despite having several family members with lung cancer    Periumbilical pain  Comments:  LFTs, T bili, lipase neg. Unlikely to be GERD, PUD; no relation to food. No ETOH use. Possibly shingles as above since it radiates to his back. Cardiac w/u    Other orders  -     Cancel: Ambulatory referral to Smoking Cessation Program           Side effects of medication(s) were discussed in detail and patient voiced understanding.  Patient will call back for any issues or complications.     RTC in 1 week(s) or sooner PRN for CP/pericarditis and periumbilical pain after cardiac workup.

## 2018-11-12 ENCOUNTER — HOSPITAL ENCOUNTER (OUTPATIENT)
Dept: CARDIOLOGY | Facility: OTHER | Age: 43
Discharge: HOME | End: 2018-11-12
Attending: INTERNAL MEDICINE
Payer: OTHER GOVERNMENT

## 2018-11-12 ENCOUNTER — OFFICE VISIT (OUTPATIENT)
Dept: INTERNAL MEDICINE | Facility: CLINIC | Age: 43
End: 2018-11-12
Payer: OTHER GOVERNMENT

## 2018-11-12 VITALS
WEIGHT: 193.56 LBS | HEART RATE: 103 BPM | HEIGHT: 67 IN | OXYGEN SATURATION: 93 % | SYSTOLIC BLOOD PRESSURE: 124 MMHG | DIASTOLIC BLOOD PRESSURE: 70 MMHG | BODY MASS INDEX: 30.38 KG/M2

## 2018-11-12 DIAGNOSIS — R10.33 PERIUMBILICAL PAIN: ICD-10-CM

## 2018-11-12 DIAGNOSIS — B02.9 HERPES ZOSTER WITHOUT COMPLICATION: ICD-10-CM

## 2018-11-12 DIAGNOSIS — Z00.00 HEALTHCARE MAINTENANCE: ICD-10-CM

## 2018-11-12 DIAGNOSIS — R07.9 CHEST PAIN, UNSPECIFIED TYPE: ICD-10-CM

## 2018-11-12 DIAGNOSIS — G47.00 INSOMNIA, UNSPECIFIED TYPE: ICD-10-CM

## 2018-11-12 DIAGNOSIS — R07.9 CHEST PAIN, UNSPECIFIED TYPE: Primary | ICD-10-CM

## 2018-11-12 DIAGNOSIS — F17.210 HEAVY SMOKER (MORE THAN 20 CIGARETTES PER DAY): ICD-10-CM

## 2018-11-12 PROBLEM — R07.89 OTHER CHEST PAIN: Status: ACTIVE | Noted: 2018-11-12

## 2018-11-12 PROBLEM — G47.09 OTHER INSOMNIA: Status: ACTIVE | Noted: 2018-11-12

## 2018-11-12 LAB — HIV1+2 IGG SERPL QL IA.RAPID: NEGATIVE

## 2018-11-12 PROCEDURE — 25000003 PHARM REV CODE 250: Performed by: EMERGENCY MEDICINE

## 2018-11-12 PROCEDURE — 99205 OFFICE O/P NEW HI 60 MIN: CPT | Mod: S$PBB,,, | Performed by: INTERNAL MEDICINE

## 2018-11-12 PROCEDURE — 99406 BEHAV CHNG SMOKING 3-10 MIN: CPT | Mod: S$PBB,,, | Performed by: INTERNAL MEDICINE

## 2018-11-12 PROCEDURE — 99999 PR PBB SHADOW E&M-EST. PATIENT-LVL V: CPT | Mod: PBBFAC,,, | Performed by: INTERNAL MEDICINE

## 2018-11-12 PROCEDURE — 99215 OFFICE O/P EST HI 40 MIN: CPT | Mod: PBBFAC | Performed by: INTERNAL MEDICINE

## 2018-11-12 RX ORDER — IBUPROFEN 600 MG/1
600 TABLET ORAL 3 TIMES DAILY
Qty: 30 TABLET | Refills: 1 | Status: SHIPPED | OUTPATIENT
Start: 2018-11-12 | End: 2019-01-07 | Stop reason: ALTCHOICE

## 2018-11-12 RX ORDER — COLCHICINE 0.6 MG/1
0.6 TABLET ORAL DAILY
Qty: 90 TABLET | Refills: 0 | Status: SHIPPED | OUTPATIENT
Start: 2018-11-12 | End: 2019-01-07 | Stop reason: ALTCHOICE

## 2018-11-12 RX ADMIN — VALACYCLOVIR HYDROCHLORIDE 1000 MG: 500 TABLET, FILM COATED ORAL at 12:11

## 2018-11-12 RX ADMIN — OXYCODONE HYDROCHLORIDE AND ACETAMINOPHEN 1 TABLET: 10; 325 TABLET ORAL at 12:11

## 2018-11-12 NOTE — ED PROVIDER NOTES
Encounter Date: 11/11/2018    SCRIBE #1 NOTE: I, Amadou Kaur, am scribing for, and in the presence of,  Jessica Chance MD. I have scribed the following portions of the note - Other sections scribed: HPI, ROS, PE.       History     Chief Complaint   Patient presents with    Chest Pain     left rib pain, was seen a week ago for same thing as well as last night. Also reports some abd pain.      CC: Chest Pain    43 year old male tobacco smoker presents with chest pain. 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 He was seen that day at [facility] (11/4/18) and had a negative workup. Patient has continued to have pain, and was seen yesterday (11/10/18) at an urgent care and referred to this ED for an abnormal EKG. His workup here yesterday (11/10/18) was also negative. Patient returns because the chest pain is severe. He developed a cough after onset of his pain. Pt denies leg swelling, bowel problems, and urinary problems. He also denies recent surgery or any long travel.     Pt reports he usually can sleep for 4-5 hours and be fine but this week he has been sleeping 12-14 hours a night. He [place] river but comes home daily. He works 14-15 hours shifts without fatigue or SOB.     Pt has not been to a PCP for 6 years but has an appointment tomorrow with a new doctor. Pt reports a history of shingles some years ago but has not had a rash with these symptoms.          Review of patient's allergies indicates:  No Known Allergies  Past Medical History:   Diagnosis Date    Heavy smoker (more than 20 cigarettes per day)     Shingles 2013     Past Surgical History:   Procedure Laterality Date    CYST REMOVAL N/A 2013    near heart    GANGLION CYST EXCISION Right     wrist     Family History   Problem Relation Age of Onset    Stroke Mother     Diabetes Father      Hypertension Father     Lung cancer Father     Lung cancer Maternal Grandmother     Lung cancer Maternal Grandfather     Lung cancer Paternal Grandmother     Lung cancer Paternal Grandfather     Lung cancer Maternal Uncle     Lung cancer Maternal Uncle      Social History     Tobacco Use    Smoking status: Current Every Day Smoker     Packs/day: 1.00     Start date: 1997   Substance Use Topics    Alcohol use: No    Drug use: Yes     Frequency: 3.0 times per week     Types: Marijuana     Review of Systems   Constitutional: Negative for appetite change and fever.   HENT: Negative for rhinorrhea and sore throat.    Eyes: Negative for visual disturbance.   Respiratory: Positive for cough. Negative for shortness of breath.    Cardiovascular: Positive for chest pain (left sided chest pain radiating to left upper quadrant and left back).   Gastrointestinal: Positive for abdominal pain (umbilical).   Genitourinary: Negative for dysuria.   Musculoskeletal: Negative for gait problem and neck pain.   Skin: Negative for rash.   Neurological: Negative for syncope.       Physical Exam     Initial Vitals [11/11/18 1947]   BP Pulse Resp Temp SpO2   120/80 86 18 98 °F (36.7 °C) 99 %      MAP       --         Physical Exam    Nursing note and vitals reviewed.  Constitutional: He appears well-developed and well-nourished. He is not diaphoretic.   Awake, alert, nontoxic male. Animated speech without evident shortness of breath.   HENT:   Head: Normocephalic and atraumatic.   Mouth/Throat: Oropharynx is clear and moist.   Eyes: Conjunctivae and EOM are normal. Pupils are equal, round, and reactive to light.   Neck: Normal range of motion. Neck supple.   Cardiovascular: Normal rate, regular rhythm, normal heart sounds and intact distal pulses.   No murmur heard.  Pulmonary/Chest: Breath sounds normal. No respiratory distress. He has no wheezes. He has no rhonchi. He has no rales. He exhibits tenderness (left lateral).    Abdominal: Soft. Bowel sounds are normal. He exhibits no distension. There is tenderness (intraumbilical). There is no rebound and no guarding.   Musculoskeletal: Normal range of motion. He exhibits no edema or tenderness.   Neurological: He is alert and oriented to person, place, and time. He has normal strength.   Moving all extremities   Skin: Skin is warm and dry. No rash noted.   Psychiatric: He has a normal mood and affect.         ED Course   Procedures  Labs Reviewed   CBC W/ AUTO DIFFERENTIAL - Abnormal; Notable for the following components:       Result Value    MCH 31.7 (*)     All other components within normal limits   COMPREHENSIVE METABOLIC PANEL   LIPASE   TROPONIN I   DRUG SCREEN PANEL, URINE EMERGENCY    Narrative:     Preferred Collection Type->Urine, Clean Catch   D DIMER, QUANTITATIVE   URINALYSIS, REFLEX TO URINE CULTURE    Narrative:     Preferred Collection Type->Urine, Clean Catch   ALCOHOL,MEDICAL (ETHANOL)   RAPID HIV     EKG Readings: (Independently Interpreted)   19:49: NSR, HR 0. TWI in aVL. No STEMI. C/w 11/10/18.     ECG Results          EKG 12-lead (Final result)  Result time 11/13/18 03:26:00    Final result by Interface, Lab In [facility] (11/13/18 03:26:00)                 Narrative:    Test Reason : R07.9  Vent. Rate : 079 BPM     Atrial Rate : 079 BPM     P-R Int : 132 ms          QRS Dur : 088 ms      QT Int : 346 ms       P-R-T Axes : 076 079 077 degrees     QTc Int : 396 ms    Normal sinus rhythm  Normal ECG  When compared with ECG of 10-NOV-2018 15:56,  No significant change was found  Confirmed by Brigida MCCALL, Hossein ([id number]) on 11/13/2018 3:25:47 AM    Referred By: LJ   SELF           Confirmed By:Hossein Allred MD                            Imaging Results          X-Ray Chest AP Portable (Final result)  Result time 11/11/18 20:55:25    Final result by Linwood Mitchell MD (11/11/18 20:55:25)                 Impression:      No acute cardiopulmonary process  identified.      Electronically signed by: Linwood Mitchell MD  Date:    11/11/2018  Time:    20:55             Narrative:    EXAMINATION:  XR CHEST AP PORTABLE    CLINICAL HISTORY:  chest pain;    TECHNIQUE:  Single frontal view of the chest was performed.    COMPARISON:  11/10/2018.    FINDINGS:  Cardiac silhouette is normal in size.  Lungs are slightly hypoinflated.  No evidence of focal consolidative process, pneumothorax, or significant effusion.  No acute osseous abnormality identified.                              X-Rays:   Independently Interpreted Readings:   Other Readings:  CXR NAD    Medical Decision Making:   History:   Old Medical Records: I decided to obtain old medical records.  Old Records Summarized: records from clinic visits and records from previous admission(s).  Initial Assessment:   43 y.o. Male with intermittent chest pain x 1 week. TTP over L anterolateral chest and LUQ, also periumbilical region.  Differential Diagnosis:   Ddx includes ACS, CHF, costochondritis, PE, PNA, other.  Independently Interpreted Test(s):   I have ordered and independently interpreted X-rays - see prior notes.  I have ordered and independently interpreted EKG Reading(s) - see prior notes  Clinical Tests:   Lab Tests: Ordered and Reviewed  Radiological Study: Ordered and Reviewed  Medical Tests: Ordered and Reviewed  ED Management:  EKG no STEMI.    CXR NAD.    Labs unremarkable including troponin and ddimer. Given duration of chest pain, single negative troponin rules out ACS. Dimer makes dissection or PE unlikely.     Patient had NS, ibuprofen, then morphine/zofran for pain in ED.    Given history of shingles and questionably dermatomal distribution of pain, I have started patient on valacylcovir in case this pain is actually a shingles recurrence without rash. I have also rx'ed small # of percocet.    I discussed negative workup with patient and wife. Patient has f/u to new PMD tomorrow. I have advised him to keep  that appointment and to return to ED for new/worsening symptoms.             Scribe Attestation:   Scribe #1: I performed the above scribed service and the documentation accurately describes the services I performed. I attest to the accuracy of the note.    Attending Attestation:           Physician Attestation for Scribe:  Physician Attestation Statement for Scribe #1: I, Jessica Chance MD, reviewed documentation, as scribed by Amadou Kaur in my presence, and it is both accurate and complete.                    Clinical Impression:   The primary encounter diagnosis was Chest pain. Diagnoses of Abdominal pain, unspecified abdominal location and Chest wall pain were also pertinent to this visit.                             Jessica Chance MD  11/14/18 0048

## 2018-11-12 NOTE — ED TRIAGE NOTES
Patient reports chest pain and abdominal pain. Patient states chest pain started last Sunday and abdominal pain started  4 days ago. Pt describes chest pain as a stabbing sensation and the abdominal pain as jabbing sensation. Pain is intermittent.

## 2018-11-13 ENCOUNTER — HOSPITAL ENCOUNTER (OUTPATIENT)
Dept: CARDIOLOGY | Facility: OTHER | Age: 43
Discharge: HOME | End: 2018-11-13
Attending: INTERNAL MEDICINE
Payer: OTHER GOVERNMENT

## 2018-11-13 VITALS
HEIGHT: 67 IN | HEART RATE: 69 BPM | BODY MASS INDEX: 30.29 KG/M2 | WEIGHT: 193 LBS | SYSTOLIC BLOOD PRESSURE: 118 MMHG | DIASTOLIC BLOOD PRESSURE: 78 MMHG

## 2018-11-13 DIAGNOSIS — R07.9 CHEST PAIN, UNSPECIFIED TYPE: ICD-10-CM

## 2018-11-13 LAB
AORTIC ROOT ANNULUS: 3.01 CM
AORTIC VALVE CUSP SEPERATION: 2.1 CM
AV MEAN GRADIENT: 2.03 MMHG
AV PEAK GRADIENT: 3.31 MMHG
AV VALVE AREA: 3 CM2
BSA FOR ECHO PROCEDURE: 2.03 M2
CV ECHO LV RWT: 0.45 CM
CV ECHO LV RWT: 0.49 CM
CV STRESS BASE HR: 69
DIASTOLIC BLOOD PRESSURE: 78
DOP CALC AO PEAK VEL: 0.91 M/S
DOP CALC AO VTI: 17.75 CM
DOP CALC LVOT AREA: 3.11 CM2
DOP CALC LVOT DIAMETER: 1.99 CM
DOP CALC LVOT STROKE VOLUME: 53.28 CM3
DOP CALCLVOT PEAK VEL VTI: 17.14 CM
E WAVE DECELERATION TIME: 203.75 MSEC
E/A RATIO: 1.68
E/E' RATIO: 8.4
ECHO LV POSTERIOR WALL: 0.98 CM (ref 0.6–1.1)
ECHO LV POSTERIOR WALL: 1 CM (ref 0.6–1.1)
FRACTIONAL SHORTENING: 28 % (ref 28–44)
FRACTIONAL SHORTENING: 30 % (ref 28–44)
INTERVENTRICULAR SEPTUM: 1 CM (ref 0.6–1.1)
INTERVENTRICULAR SEPTUM: 1.03 CM (ref 0.6–1.1)
LA MAJOR: 4.69 CM
LA MINOR: 4.59 CM
LA WIDTH: 3.77 CM
LEFT ATRIUM SIZE: 3.4 CM
LEFT ATRIUM VOLUME: 50.55 CM3
LEFT INTERNAL DIMENSION IN SYSTOLE: 2.88 CM (ref 2.1–4)
LEFT INTERNAL DIMENSION IN SYSTOLE: 3.1 CM (ref 2.1–4)
LEFT VENTRICLE DIASTOLIC VOLUME: 70.63 ML
LEFT VENTRICLE MASS INDEX: 72.8 G/M2
LEFT VENTRICLE SYSTOLIC VOLUME: 31.6 ML
LEFT VENTRICULAR INTERNAL DIMENSION IN DIASTOLE: 4.02 CM (ref 3.5–6)
LEFT VENTRICULAR INTERNAL DIMENSION IN DIASTOLE: 4.4 CM (ref 3.5–6)
LEFT VENTRICULAR MASS: 128.97 G
LEFT VENTRICULAR MASS: 147.83 G
LV LATERAL E/E' RATIO: 8.4
LV SEPTAL E/E' RATIO: 8.4
MV PEAK A VEL: 0.5 M/S
MV PEAK E VEL: 0.84 M/S
OHS CV CPX 1 MINUTE RECOVERY HEART RATE: 99 BPM
OHS CV CPX 85 PERCENT MAX PREDICTED HEART RATE MALE: 152
OHS CV CPX ESTIMATED METS: 10
OHS CV CPX MAX PREDICTED HEART RATE: 179
OHS CV CPX PATIENT IS FEMALE: 0
OHS CV CPX PATIENT IS MALE: 1
OHS CV CPX PEAK DIASTOLIC BLOOD PRESSURE: 65 MMHG
OHS CV CPX PEAK HEAR RATE: 139
OHS CV CPX PEAK RATE PRESSURE PRODUCT: NORMAL
OHS CV CPX PEAK SYSTOLIC BLOOD PRESSURE: 130
OHS CV CPX PERCENT MAX PREDICTED HEART RATE ACHIEVED: 78
OHS CV CPX RATE PRESSURE PRODUCT PRESENTING: 8142
PISA TR MAX VEL: 2.12 M/S
PV PEAK VELOCITY: 0.93 CM/S
RA MAJOR: 4.32 CM
RA WIDTH: 3.83 CM
SINUS: 3.01 CM
STJ: 2.3 CM
STRESS ANGINA INDEX: 0
STRESS ECHO POST EXERCISE DUR MIN: 7 MIN
STRESS ECHO POST EXERCISE DUR SEC: 48
SYSTOLIC BLOOD PRESSURE: 118
TDI LATERAL: 0.1
TDI SEPTAL: 0.1
TDI: 0.1
TR MAX PG: 17.98 MMHG

## 2018-11-13 PROCEDURE — 93306 TTE W/DOPPLER COMPLETE: CPT | Mod: 26,,, | Performed by: INTERNAL MEDICINE

## 2018-11-13 PROCEDURE — 93351 STRESS TTE COMPLETE: CPT | Mod: 26,,, | Performed by: INTERNAL MEDICINE

## 2018-11-13 PROCEDURE — 93306 TTE W/DOPPLER COMPLETE: CPT

## 2018-11-13 PROCEDURE — 93351 STRESS TTE COMPLETE: CPT

## 2018-11-14 ENCOUNTER — TELEPHONE (OUTPATIENT)
Dept: INTERNAL MEDICINE | Facility: CLINIC | Age: 43
End: 2018-11-14

## 2018-11-14 ENCOUNTER — DOCUMENTATION ONLY (OUTPATIENT)
Dept: INTERNAL MEDICINE | Facility: CLINIC | Age: 43
End: 2018-11-14

## 2018-11-14 NOTE — TELEPHONE ENCOUNTER
Left voice message for patient to schedule appointment from referral to Cardiology Clinic.  Bubba SUÁREZ  [phone]
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
## 2018-11-19 ENCOUNTER — OFFICE VISIT (OUTPATIENT)
Dept: INTERNAL MEDICINE | Facility: CLINIC | Age: 43
End: 2018-11-19
Payer: OTHER GOVERNMENT

## 2018-11-19 VITALS
OXYGEN SATURATION: 97 % | DIASTOLIC BLOOD PRESSURE: 80 MMHG | HEIGHT: 67 IN | SYSTOLIC BLOOD PRESSURE: 119 MMHG | HEART RATE: 82 BPM | BODY MASS INDEX: 30.1 KG/M2 | WEIGHT: 191.81 LBS

## 2018-11-19 DIAGNOSIS — B02.9 HERPES ZOSTER WITHOUT COMPLICATION: ICD-10-CM

## 2018-11-19 DIAGNOSIS — Z00.00 HEALTHCARE MAINTENANCE: ICD-10-CM

## 2018-11-19 DIAGNOSIS — G47.09 OTHER INSOMNIA: ICD-10-CM

## 2018-11-19 DIAGNOSIS — F32.89 OTHER DEPRESSION: ICD-10-CM

## 2018-11-19 DIAGNOSIS — F17.210 HEAVY SMOKER (MORE THAN 20 CIGARETTES PER DAY): ICD-10-CM

## 2018-11-19 DIAGNOSIS — R07.89 OTHER CHEST PAIN: Primary | ICD-10-CM

## 2018-11-19 DIAGNOSIS — Z87.19 HISTORY OF RECTAL BLEEDING: ICD-10-CM

## 2018-11-19 PROBLEM — F32.A DEPRESSION: Status: ACTIVE | Noted: 2018-11-19

## 2018-11-19 PROCEDURE — 99999 PR PBB SHADOW E&M-EST. PATIENT-LVL III: CPT | Mod: PBBFAC,,, | Performed by: INTERNAL MEDICINE

## 2018-11-19 PROCEDURE — 99215 OFFICE O/P EST HI 40 MIN: CPT | Mod: S$PBB,25,, | Performed by: INTERNAL MEDICINE

## 2018-11-19 PROCEDURE — 99213 OFFICE O/P EST LOW 20 MIN: CPT | Mod: PBBFAC | Performed by: INTERNAL MEDICINE

## 2018-11-19 PROCEDURE — 99406 BEHAV CHNG SMOKING 3-10 MIN: CPT | Mod: S$PBB,,, | Performed by: INTERNAL MEDICINE

## 2018-11-19 RX ORDER — TRAZODONE HYDROCHLORIDE 150 MG/1
150 TABLET ORAL NIGHTLY
Qty: 42 TABLET | Refills: 0 | Status: SHIPPED | OUTPATIENT
Start: 2018-11-19 | End: 2019-01-07 | Stop reason: SDUPTHER

## 2018-11-19 NOTE — PATIENT INSTRUCTIONS
May take Melatonin 5 mg by mouth 3-4 hours before sleep.    Sleep Hygiene:  1. Avoid watching TV, eating, and discussing emotional issues in bed. The bed should be used for sleep and sex only. If not, we can associate the bed with other activities and it often becomes difficult to fall asleep.  2. Minimize noise, light, and temperature extremes during sleep with ear plugs, window blinds, or an electric blanket or air conditioner. Even the slightest nighttime noises or luminescent lights can disrupt the quality of your sleep. Try to keep your bedroom at a comfortable temperature -- not too hot (above 75 degrees) or too cold (below 54 degrees).  3. Try not to drink fluids after 8 p.m. This may reduce awakenings due to urination.  4. Avoid naps, but if you do nap, make it no more than about 25 minutes about eight hours after you awake. But if you have problems falling asleep, then no naps for you.  5. Do not expose your self to bright light if you need to get up at night. Use a small night-light instead.  6. Nicotine is a stimulant and should be avoided particularly near bedtime and upon night awakenings. Having a smoke before bed, although it may feel relaxing, is actually putting a stimulant into your bloodstream.  7. Caffeine is also a stimulant and is present in coffee (100-200 mg), soda (50-75 mg), tea (50-75 mg), and various over-the-counter medications. Caffeine should be discontinued at least four to six hours before bedtime. If you consume large amounts of caffeine and you cut your self off too quickly, beware; you may get headaches that could keep you awake.  8. Although alcohol is a depressant and may help you fall asleep, the subsequent metabolism that clears it from your body when you are sleeping causes a withdrawal syndrome. This withdrawal causes awakenings and is often associated with nightmares and sweats.  9. A light snack may be sleep-inducing, but a heavy meal too close to bedtime interferes with  sleep. Stay away from protein and stick to carbohydrates or dairy products. Milk contains the amino acid L-tryptophan, which has been shown in research to help people go to sleep. So milk and cookies or crackers (without chocolate) may be useful and taste good as well.

## 2018-11-19 NOTE — LETTER
November 19, 2018                 [facility]  Internal Medicine  [address]  [facility] [address]  Phone: [phone]  Fax: [phone]   November 19, 2018     Patient: Donell Park   YOB: 1975   Date of Visit: 11/19/2018       To Whom it May Concern:    Donell Park was seen in the ER on 11/4/18. He may return to work on 11/20/18 with no restrictions..    If you have any questions or concerns, please don't hesitate to call.    Sincerely,         Yenny Resendiz MD

## 2019-01-04 PROBLEM — Z28.21 REFUSES TETANUS, DIPHTHERIA, AND ACELLULAR PERTUSSIS (TDAP) VACCINATION: Status: ACTIVE | Noted: 2019-01-04

## 2019-01-04 PROBLEM — Z28.21 PNEUMOCOCCAL VACCINE REFUSED: Status: ACTIVE | Noted: 2019-01-04

## 2019-01-04 PROBLEM — Z28.21 INFLUENZA VACCINE REFUSED: Status: ACTIVE | Noted: 2019-01-04
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
## 2019-01-07 ENCOUNTER — OFFICE VISIT (OUTPATIENT)
Dept: INTERNAL MEDICINE | Facility: CLINIC | Age: 44
End: 2019-01-07
Payer: OTHER GOVERNMENT

## 2019-01-07 VITALS
WEIGHT: 205.94 LBS | BODY MASS INDEX: 32.32 KG/M2 | DIASTOLIC BLOOD PRESSURE: 80 MMHG | SYSTOLIC BLOOD PRESSURE: 114 MMHG | HEIGHT: 67 IN | HEART RATE: 111 BPM | OXYGEN SATURATION: 98 %

## 2019-01-07 DIAGNOSIS — F33.0 MILD EPISODE OF RECURRENT MAJOR DEPRESSIVE DISORDER: Primary | ICD-10-CM

## 2019-01-07 DIAGNOSIS — Z28.21 PNEUMOCOCCAL VACCINE REFUSED: ICD-10-CM

## 2019-01-07 DIAGNOSIS — G89.29 CHRONIC PAIN OF LEFT KNEE: ICD-10-CM

## 2019-01-07 DIAGNOSIS — F17.210 HEAVY SMOKER (MORE THAN 20 CIGARETTES PER DAY): ICD-10-CM

## 2019-01-07 DIAGNOSIS — Z28.21 INFLUENZA VACCINE REFUSED: ICD-10-CM

## 2019-01-07 DIAGNOSIS — G47.09 OTHER INSOMNIA: ICD-10-CM

## 2019-01-07 DIAGNOSIS — M25.562 CHRONIC PAIN OF LEFT KNEE: ICD-10-CM

## 2019-01-07 DIAGNOSIS — Z28.21 REFUSES TETANUS, DIPHTHERIA, AND ACELLULAR PERTUSSIS (TDAP) VACCINATION: ICD-10-CM

## 2019-01-07 DIAGNOSIS — B02.9 HERPES ZOSTER WITHOUT COMPLICATION: ICD-10-CM

## 2019-01-07 PROBLEM — M25.569 CHRONIC KNEE PAIN: Status: ACTIVE | Noted: 2019-01-07

## 2019-01-07 PROCEDURE — 99215 PR OFFICE/OUTPT VISIT, EST, LEVL V, 40-54 MIN: ICD-10-PCS | Mod: S$PBB,,, | Performed by: INTERNAL MEDICINE

## 2019-01-07 PROCEDURE — 99406 BEHAV CHNG SMOKING 3-10 MIN: CPT | Mod: S$PBB,,, | Performed by: INTERNAL MEDICINE

## 2019-01-07 PROCEDURE — 99999 PR PBB SHADOW E&M-EST. PATIENT-LVL IV: ICD-10-PCS | Mod: PBBFAC,,, | Performed by: INTERNAL MEDICINE

## 2019-01-07 PROCEDURE — 99215 OFFICE O/P EST HI 40 MIN: CPT | Mod: S$PBB,,, | Performed by: INTERNAL MEDICINE

## 2019-01-07 PROCEDURE — 99214 OFFICE O/P EST MOD 30 MIN: CPT | Mod: PBBFAC | Performed by: INTERNAL MEDICINE

## 2019-01-07 PROCEDURE — 99999 PR PBB SHADOW E&M-EST. PATIENT-LVL IV: CPT | Mod: PBBFAC,,, | Performed by: INTERNAL MEDICINE

## 2019-01-07 PROCEDURE — 99406 PR TOBACCO USE CESSATION INTERMEDIATE 3-10 MINUTES: ICD-10-PCS | Mod: S$PBB,,, | Performed by: INTERNAL MEDICINE

## 2019-01-07 RX ORDER — TRAZODONE HYDROCHLORIDE 150 MG/1
150 TABLET ORAL NIGHTLY
Qty: 90 TABLET | Refills: 1 | Status: SHIPPED | OUTPATIENT
Start: 2019-01-07 | End: 2019-06-03

## 2019-01-07 RX ORDER — MELOXICAM 7.5 MG/1
7.5 TABLET ORAL DAILY
Qty: 7 TABLET | Refills: 0 | Status: SHIPPED | OUTPATIENT
Start: 2019-01-07 | End: 2019-01-14

## 2019-02-23 ENCOUNTER — HOSPITAL ENCOUNTER (EMERGENCY)
Facility: HOSPITAL | Age: 44
Discharge: HOME | End: 2019-02-23
Attending: EMERGENCY MEDICINE
Payer: OTHER GOVERNMENT

## 2019-02-23 VITALS
HEIGHT: 67 IN | BODY MASS INDEX: 28.25 KG/M2 | SYSTOLIC BLOOD PRESSURE: 134 MMHG | TEMPERATURE: 98 F | RESPIRATION RATE: 16 BRPM | DIASTOLIC BLOOD PRESSURE: 86 MMHG | OXYGEN SATURATION: 99 % | HEART RATE: 78 BPM | WEIGHT: 180 LBS

## 2019-02-23 DIAGNOSIS — K59.00 CONSTIPATION, UNSPECIFIED CONSTIPATION TYPE: ICD-10-CM

## 2019-02-23 DIAGNOSIS — R53.1 WEAKNESS: ICD-10-CM

## 2019-02-23 DIAGNOSIS — R11.0 NAUSEA: ICD-10-CM

## 2019-02-23 DIAGNOSIS — R10.13 EPIGASTRIC PAIN: Primary | ICD-10-CM

## 2019-02-23 LAB
ABO + RH BLD: NORMAL
ALBUMIN SERPL BCP-MCNC: 4.3 G/DL
ALP SERPL-CCNC: 58 U/L
ALT SERPL W/O P-5'-P-CCNC: 16 U/L
ANION GAP SERPL CALC-SCNC: 12 MMOL/L
APTT BLDCRRT: 28.5 SEC
AST SERPL-CCNC: 17 U/L
BASOPHILS # BLD AUTO: 0.04 K/UL
BASOPHILS NFR BLD: 0.5 %
BILIRUB SERPL-MCNC: 0.9 MG/DL
BILIRUB UR QL STRIP: NEGATIVE
BLD GP AB SCN CELLS X3 SERPL QL: NORMAL
BUN SERPL-MCNC: 14 MG/DL
CALCIUM SERPL-MCNC: 9.4 MG/DL
CHLORIDE SERPL-SCNC: 109 MMOL/L
CLARITY UR: CLEAR
CO2 SERPL-SCNC: 20 MMOL/L
COLOR UR: YELLOW
CREAT SERPL-MCNC: 0.9 MG/DL
DIFFERENTIAL METHOD: ABNORMAL
EOSINOPHIL # BLD AUTO: 0.4 K/UL
EOSINOPHIL NFR BLD: 4.4 %
ERYTHROCYTE [DISTWIDTH] IN BLOOD BY AUTOMATED COUNT: 14 %
EST. GFR  (AFRICAN AMERICAN): >60 ML/MIN/1.73 M^2
EST. GFR  (NON AFRICAN AMERICAN): >60 ML/MIN/1.73 M^2
GLUCOSE SERPL-MCNC: 88 MG/DL
GLUCOSE UR QL STRIP: NEGATIVE
HCT VFR BLD AUTO: 43.4 %
HGB BLD-MCNC: 15.5 G/DL
HGB UR QL STRIP: NEGATIVE
INR PPP: 1
KETONES UR QL STRIP: ABNORMAL
LEUKOCYTE ESTERASE UR QL STRIP: NEGATIVE
LIPASE SERPL-CCNC: 21 U/L
LYMPHOCYTES # BLD AUTO: 2.5 K/UL
LYMPHOCYTES NFR BLD: 30.7 %
MCH RBC QN AUTO: 32.2 PG
MCHC RBC AUTO-ENTMCNC: 35.7 G/DL
MCV RBC AUTO: 90 FL
MONOCYTES # BLD AUTO: 1.3 K/UL
MONOCYTES NFR BLD: 15.2 %
NEUTROPHILS # BLD AUTO: 4 K/UL
NEUTROPHILS NFR BLD: 49.2 %
NITRITE UR QL STRIP: NEGATIVE
PH UR STRIP: 5 [PH] (ref 5–8)
PLATELET # BLD AUTO: 266 K/UL
PMV BLD AUTO: 10.8 FL
POTASSIUM SERPL-SCNC: 3.5 MMOL/L
PROT SERPL-MCNC: 6.8 G/DL
PROT UR QL STRIP: NEGATIVE
PROTHROMBIN TIME: 10.1 SEC
RBC # BLD AUTO: 4.82 M/UL
SODIUM SERPL-SCNC: 141 MMOL/L
SP GR UR STRIP: >1.03 (ref 1–1.03)
URN SPEC COLLECT METH UR: ABNORMAL
UROBILINOGEN UR STRIP-ACNC: ABNORMAL EU/DL
WBC # BLD AUTO: 8.21 K/UL

## 2019-02-23 PROCEDURE — 85025 COMPLETE CBC W/AUTO DIFF WBC: CPT

## 2019-02-23 PROCEDURE — 80053 COMPREHEN METABOLIC PANEL: CPT

## 2019-02-23 PROCEDURE — 93010 EKG 12-LEAD: ICD-10-PCS | Mod: ,,, | Performed by: INTERNAL MEDICINE

## 2019-02-23 PROCEDURE — 25500020 PHARM REV CODE 255: Performed by: EMERGENCY MEDICINE

## 2019-02-23 PROCEDURE — 96374 THER/PROPH/DIAG INJ IV PUSH: CPT

## 2019-02-23 PROCEDURE — 83690 ASSAY OF LIPASE: CPT

## 2019-02-23 PROCEDURE — 81003 URINALYSIS AUTO W/O SCOPE: CPT

## 2019-02-23 PROCEDURE — 93005 ELECTROCARDIOGRAM TRACING: CPT

## 2019-02-23 PROCEDURE — 25000003 PHARM REV CODE 250: Performed by: PHYSICIAN ASSISTANT

## 2019-02-23 PROCEDURE — 93010 ELECTROCARDIOGRAM REPORT: CPT | Mod: ,,, | Performed by: INTERNAL MEDICINE

## 2019-02-23 PROCEDURE — 85730 THROMBOPLASTIN TIME PARTIAL: CPT

## 2019-02-23 PROCEDURE — 63600175 PHARM REV CODE 636 W HCPCS: Performed by: PHYSICIAN ASSISTANT

## 2019-02-23 PROCEDURE — 99285 EMERGENCY DEPT VISIT HI MDM: CPT | Mod: 25

## 2019-02-23 PROCEDURE — 96375 TX/PRO/DX INJ NEW DRUG ADDON: CPT | Mod: 59

## 2019-02-23 PROCEDURE — 85610 PROTHROMBIN TIME: CPT

## 2019-02-23 PROCEDURE — 86901 BLOOD TYPING SEROLOGIC RH(D): CPT

## 2019-02-23 RX ORDER — ONDANSETRON 2 MG/ML
4 INJECTION INTRAMUSCULAR; INTRAVENOUS
Status: COMPLETED | OUTPATIENT
Start: 2019-02-23 | End: 2019-02-23

## 2019-02-23 RX ORDER — MORPHINE SULFATE 10 MG/ML
4 INJECTION INTRAMUSCULAR; INTRAVENOUS; SUBCUTANEOUS
Status: DISCONTINUED | OUTPATIENT
Start: 2019-02-23 | End: 2019-02-23 | Stop reason: HOSPADM

## 2019-02-23 RX ORDER — PANTOPRAZOLE SODIUM 20 MG/1
20 TABLET, DELAYED RELEASE ORAL DAILY
Qty: 30 TABLET | Refills: 0 | Status: SHIPPED | OUTPATIENT
Start: 2019-02-23 | End: 2019-04-17

## 2019-02-23 RX ORDER — ONDANSETRON 4 MG/1
4 TABLET, ORALLY DISINTEGRATING ORAL EVERY 6 HOURS PRN
Qty: 20 TABLET | Refills: 0 | Status: SHIPPED | OUTPATIENT
Start: 2019-02-23 | End: 2019-02-28

## 2019-02-23 RX ORDER — POLYETHYLENE GLYCOL 3350 17 G/17G
17 POWDER, FOR SOLUTION ORAL DAILY
Qty: 14 EACH | Refills: 0 | Status: SHIPPED | OUTPATIENT
Start: 2019-02-23 | End: 2019-03-09

## 2019-02-23 RX ORDER — ACETAMINOPHEN 500 MG
500 TABLET ORAL EVERY 4 HOURS PRN
Qty: 20 TABLET | Refills: 0 | Status: SHIPPED | OUTPATIENT
Start: 2019-02-23 | End: 2019-02-28

## 2019-02-23 RX ORDER — KETOROLAC TROMETHAMINE 30 MG/ML
15 INJECTION, SOLUTION INTRAMUSCULAR; INTRAVENOUS
Status: COMPLETED | OUTPATIENT
Start: 2019-02-23 | End: 2019-02-23

## 2019-02-23 RX ORDER — DICYCLOMINE HYDROCHLORIDE 20 MG/1
20 TABLET ORAL 4 TIMES DAILY PRN
Qty: 20 TABLET | Refills: 0 | Status: SHIPPED | OUTPATIENT
Start: 2019-02-23 | End: 2019-03-02

## 2019-02-23 RX ADMIN — KETOROLAC TROMETHAMINE 15 MG: 30 INJECTION, SOLUTION INTRAMUSCULAR at 10:02

## 2019-02-23 RX ADMIN — IOHEXOL 75 ML: 350 INJECTION, SOLUTION INTRAVENOUS at 09:02

## 2019-02-23 RX ADMIN — ONDANSETRON 4 MG: 2 INJECTION INTRAMUSCULAR; INTRAVENOUS at 09:02

## 2019-02-23 RX ADMIN — LIDOCAINE HYDROCHLORIDE: 20 SOLUTION ORAL; TOPICAL at 10:02

## 2019-02-23 NOTE — DISCHARGE INSTRUCTIONS
Take Bentyl, Tylenol and Protonix for abdominal pain.   Take Zofran for nausea. Take Miralax for constipation.     Follow up with primary care and GI in 1-2 days. Return to ER for worsening symptoms, dizziness, lightheadedness, chest pain, shortness of breath, rectal bleeding, dark black stools or as needed

## 2019-02-23 NOTE — ED NOTES
Pt. Reports he just found out his uncle passed away and he has to leave. Provider made aware. D/c instruction reviewed with pt. By ALON Leahy

## 2019-02-23 NOTE — ED PROVIDER NOTES
Encounter Date: 2/23/2019    SCRIBE #1 NOTE: I, Jermaine Chambers, am scribing for, and in the presence of,  Tosin Rene PA-C. I have scribed the following portions of the note - Other sections scribed: HPI, ROS .       History     Chief Complaint   Patient presents with    Abdominal Pain     pt reports epigastric abdominal pain starting 2 weeks ago and has been worsening; pt reports that he had 1 bowel movement yesterday and it was dark/black with dark red blood in it (formed stool); pt denies diarrhea; pt also reports nausea and feeling lightheaded; pt denies any hx with these symptoms    Rectal Bleeding     CC: Abdominal Pain/Rectal Bleeding    HPI: 42 y/o M who has a past medical history of Depression presents to the ED c/o  2 week history of intermittent burning epigastric pain 10/10  worsening in the past few days. Patient reports decreased appetite x2 weeks and  nausea.  He reports having single episode of reported melena with dark red blood in it yesterday.  He had last colonoscopy 5 years ago for episode rectal bleeding and is unable to recall if current episode is similar.  Denies any family history of colon cancer for GI problems. Pt reports since onset of pain he has only had one BM every 3 days when he usually has them daily. He reports he is lightheaded with going from sitting to standing within past few days. Attempted tx with excedrin. Denies history of similar episodes, CP, SOB, dizziness, urinary symptoms. Pt is a smoker: 1 pack/day. Pt is a non drinker. His last alcoholic drink was seven years ago.         The history is provided by the patient. No  was used.     Review of patient's allergies indicates:  No Known Allergies  Past Medical History:   Diagnosis Date    Depression     Heavy smoker (more than 20 cigarettes per day)     History of rectal bleeding 2012    [facility]H Colonoscopy without polyps.    Shingles 2013     Past Surgical History:   Procedure Laterality Date     CYST REMOVAL N/A 2013    near heart    GANGLION CYST EXCISION Right     wrist     Family History   Problem Relation Age of Onset    Stroke Mother     Diabetes Father     Hypertension Father     Lung cancer Father     Lung cancer Maternal Grandmother     Lung cancer Maternal Grandfather     Lung cancer Paternal Grandmother     Lung cancer Paternal Grandfather     Lung cancer Maternal Uncle     Lung cancer Maternal Uncle      Social History     Tobacco Use    Smoking status: Current Every Day Smoker     Packs/day: 1.00     Start date: 1997    Smokeless tobacco: Never Used   Substance Use Topics    Alcohol use: No    Drug use: No     Comment: States he quit in 2018.     Review of Systems   Constitutional: Negative for fever.   HENT: Negative for trouble swallowing.    Eyes: Negative for redness.   Respiratory: Negative for shortness of breath.    Cardiovascular: Negative for chest pain.   Gastrointestinal: Positive for abdominal pain and nausea. Negative for constipation, diarrhea and vomiting.   Genitourinary: Negative for dysuria, frequency, hematuria and urgency.   Musculoskeletal: Negative for back pain and neck pain.   Skin: Negative for rash.   Neurological: Positive for light-headedness. Negative for dizziness.   Psychiatric/Behavioral: Negative for confusion.       Physical Exam     Initial Vitals [02/23/19 0630]   BP Pulse Resp Temp SpO2   122/69 80 17 98.5 °F (36.9 °C) 98 %      MAP       --         Physical Exam    Nursing note and vitals reviewed.  Constitutional: He appears well-developed and well-nourished. No distress.   HENT:   Head: Normocephalic.   Right Ear: External ear normal.   Left Ear: External ear normal.   Nose: Nose normal.   Mouth/Throat: Oropharynx is clear and moist. No oropharyngeal exudate.   Eyes: Conjunctivae are normal.   Neck: Neck supple.   Cardiovascular: Normal rate and regular rhythm. Exam reveals no gallop and no friction rub.    No murmur heard.  Pulmonary/Chest:  Breath sounds normal. No respiratory distress. He has no wheezes. He has no rhonchi. He has no rales.   Abdominal: Soft. Bowel sounds are normal. He exhibits no distension. There is tenderness in the right upper quadrant, epigastric area and left upper quadrant. There is guarding. There is no rigidity, no rebound, no CVA tenderness, no tenderness at McBurney's point and negative Adams's sign.   Genitourinary: Rectum normal. Rectal exam shows no external hemorrhoid, no internal hemorrhoid, no mass and no tenderness.   Genitourinary Comments: Chaperoned by Osman Tony RN:   Light brown stool in rectal vault. No melena. No gross blood. FOBT negative.    Lymphadenopathy:     He has no cervical adenopathy.   Neurological: He is alert.   Skin: Skin is warm and dry. No rash noted.   Psychiatric: He has a normal mood and affect.         ED Course   Procedures  Labs Reviewed   CBC W/ AUTO DIFFERENTIAL - Abnormal; Notable for the following components:       Result Value    MCH 32.2 (*)     Mono # 1.3 (*)     Mono% 15.2 (*)     All other components within normal limits   COMPREHENSIVE METABOLIC PANEL - Abnormal; Notable for the following components:    CO2 20 (*)     All other components within normal limits   URINALYSIS, REFLEX TO URINE CULTURE - Abnormal; Notable for the following components:    Specific Gravity, UA >1.030 (*)     Ketones, UA 1+ (*)     Urobilinogen, UA 4.0-6.0 (*)     All other components within normal limits    Narrative:     Preferred Collection Type->Urine, Clean Catch   LIPASE   PROTIME-INR   APTT   TYPE & SCREEN        ECG Results          EKG 12-lead (Final result)  Result time 02/23/19 14:12:50    Final result by Interface, Lab In [facility] (02/23/19 14:12:50)                 Narrative:    Test Reason : R53.1,    Vent. Rate : 077 BPM     Atrial Rate : 077 BPM     P-R Int : 136 ms          QRS Dur : 090 ms      QT Int : 370 ms       P-R-T Axes : 067 064 044 degrees     QTc Int : 418 ms    Normal  sinus rhythm with sinus arrhythmia  Normal ECG  When compared with ECG of 11-NOV-2018 19:49,  No significant change was found  Confirmed by Andreas York MD ([id number]) on 2/23/2019 2:12:41 PM    Referred By: [email or web address]   SELF           Confirmed By:Andreas York MD                            Imaging Results          CT Abdomen Pelvis With Contrast (Final result)  Result time 02/23/19 10:02:42    Final result by Grant Olvera Jr., MD (02/23/19 10:02:42)                 Impression:      No significant abnormality identified.  There is some feces in the right and transverse colon.      Electronically signed by: rGant Olvera MD  Date:    02/23/2019  Time:    10:02             Narrative:    EXAMINATION:  CT ABDOMEN PELVIS WITH CONTRAST    CLINICAL HISTORY:  GI bleeding;    TECHNIQUE:  Low dose axial images, sagittal and coronal reformations were obtained from the lung bases to the pubic symphysis following the IV administration of 75 mL of Omnipaque 350 oral contrast was not administered.    COMPARISON:  CT January 2017.    FINDINGS:  The lung bases are clear.  No pleural or pericardial fluid.    In the abdomen the liver is not enlarged.  No focal mass lesions seen.  Gallbladder is not distended.  Pancreas and spleen are normal.  No adrenal masses.  Kidneys are normal in size and contour though there is some cortical thinning.  No focal mass lesions seen.    Aorta tapers normally.  No significant para-aortic adenopathy.    In the pelvis no pelvic adenopathy.  Bladder is decompressed.  Prostate unremarkable for age.    Evaluation of the bowel demonstrates a significant amount of feces in the right and transverse colon.  Appendix appears unremarkable.  Some scattered air in small bowel though no focal dilatation.  Stomach is not distended.  Fat containing inguinal hernias noted bilaterally.    Bone windows demonstrate no lytic or blastic lesions.                                 Medical Decision Making:   Initial Assessment:    44 y/o male with hx of GERD and history of rectal bleeding (colonoscopy last > 5 years ago) presenting for evaluation of a 2 week history of intermittent burning epigastric pain worsening in the past few days.  Patient reports decreased appetite x2 weeks.  He reports nausea.  Did have single episode of reported melena with dark red blood in yesterday.  He had last colonoscopy 5 years ago for rectal bleeding.  Denies any family history of colon cancer for GI problems    Patient is afebrile, well-appearing in no distress.  He is not hypotensive or tachycardic.  Exam above.  CBC without anemia  leukocytosis.  CMP without electrolyte abnormality.  Lipase within normal limits. CT abdomen pelvis is negative for bowel obstruction, cholelithiasis or cholecystitis, acute surgical abdomen.There is constipation present which may explain his abdominal pain.  No melena or gross blood on rectal examination. Doubt significant GI bleed. FOBT for negative    Improved after Toradol IV and GI cocktail.  Unsure of exact etiology of patient's symptoms however this may be gastritis, GERD, peptic ulcer disease, malignancy among others.  Considered but doubt acute surgical abdomen at this time.  There is no rectal bleeding at this time    Follow up with GI in 1-2 days and primary care.  Return to ER for worsening symptoms or as needed.  Discussed with Dr. Cunningham who agrees with a ssessdment and plan.             Scribe Attestation:   Scribe #1: I performed the above scribed service and the documentation accurately describes the services I performed. I attest to the accuracy of the note.    Attending Attestation:           Physician Attestation for Scribe:  Physician Attestation Statement for Scribe #1: I, Tosin Rene PA-C, reviewed documentation, as scribed by Jermaine Chambers  in my presence, and it is both accurate and complete.                    Clinical Impression:       ICD-10-CM ICD-9-CM   1. Epigastric pain R10.13 789.06   2.  Weakness R53.1 780.79   3. Nausea R11.0 787.02   4. Constipation, unspecified constipation type K59.00 564.00                                Tosin Rene PA-C  02/23/19 1702

## 2019-02-23 NOTE — ED TRIAGE NOTES
The pt states his midabdominal pain started 2 weeks ago and it is getting worse. Yesterday he had black stool. States he has a lost of appetite. Has nausea and reports he is lightheaded. Denies fever, vomiting and diarrhea.

## 2019-04-17 ENCOUNTER — OFFICE VISIT (OUTPATIENT)
Dept: INTERNAL MEDICINE | Facility: CLINIC | Age: 44
End: 2019-04-17
Payer: OTHER GOVERNMENT

## 2019-04-17 VITALS
OXYGEN SATURATION: 98 % | WEIGHT: 186.5 LBS | HEIGHT: 67 IN | BODY MASS INDEX: 29.27 KG/M2 | SYSTOLIC BLOOD PRESSURE: 110 MMHG | HEART RATE: 77 BPM | DIASTOLIC BLOOD PRESSURE: 82 MMHG

## 2019-04-17 DIAGNOSIS — G47.09 OTHER INSOMNIA: ICD-10-CM

## 2019-04-17 DIAGNOSIS — Z87.891 HISTORY OF TOBACCO USE: ICD-10-CM

## 2019-04-17 DIAGNOSIS — L28.2 PRURITIC RASH: ICD-10-CM

## 2019-04-17 DIAGNOSIS — B02.9 HERPES ZOSTER WITHOUT COMPLICATION: ICD-10-CM

## 2019-04-17 DIAGNOSIS — F33.42 RECURRENT MAJOR DEPRESSIVE DISORDER, IN FULL REMISSION: ICD-10-CM

## 2019-04-17 DIAGNOSIS — S89.92XD INJURY OF LIGAMENT OF LEFT KNEE, SUBSEQUENT ENCOUNTER: Primary | ICD-10-CM

## 2019-04-17 PROBLEM — S89.92XA: Status: ACTIVE | Noted: 2019-01-07

## 2019-04-17 PROCEDURE — 99214 OFFICE O/P EST MOD 30 MIN: CPT | Mod: PBBFAC | Performed by: INTERNAL MEDICINE

## 2019-04-17 PROCEDURE — 99999 PR PBB SHADOW E&M-EST. PATIENT-LVL IV: ICD-10-PCS | Mod: PBBFAC,,, | Performed by: INTERNAL MEDICINE

## 2019-04-17 PROCEDURE — 99999 PR PBB SHADOW E&M-EST. PATIENT-LVL IV: CPT | Mod: PBBFAC,,, | Performed by: INTERNAL MEDICINE

## 2019-04-17 PROCEDURE — 99215 OFFICE O/P EST HI 40 MIN: CPT | Mod: S$PBB,,, | Performed by: INTERNAL MEDICINE

## 2019-04-17 PROCEDURE — 99215 PR OFFICE/OUTPT VISIT, EST, LEVL V, 40-54 MIN: ICD-10-PCS | Mod: S$PBB,,, | Performed by: INTERNAL MEDICINE

## 2019-04-17 RX ORDER — TRAMADOL HYDROCHLORIDE 50 MG/1
50 TABLET ORAL EVERY 6 HOURS PRN
Qty: 30 TABLET | Refills: 0 | Status: SHIPPED | OUTPATIENT
Start: 2019-04-17 | End: 2019-06-24

## 2019-04-17 RX ORDER — ACETAMINOPHEN 500 MG
500 TABLET ORAL EVERY 6 HOURS PRN
Qty: 30 TABLET | Refills: 2 | Status: SHIPPED | OUTPATIENT
Start: 2019-04-17 | End: 2019-06-03

## 2019-04-17 RX ORDER — HYDROCORTISONE 25 MG/G
CREAM TOPICAL 2 TIMES DAILY
Qty: 20 G | Refills: 3 | Status: SHIPPED | OUTPATIENT
Start: 2019-04-17 | End: 2019-08-05
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
## 2019-04-17 NOTE — PATIENT INSTRUCTIONS
Over-the-counter NSAIDs such as Aleve, ibuprofen, naproxen, Motrin, goodies powder.    Antihistamines (Allegra, Claritin, Xzyal, Zyrtec) for itching.

## 2019-04-20 ENCOUNTER — HOSPITAL ENCOUNTER (EMERGENCY)
Facility: HOSPITAL | Age: 44
Discharge: HOME | End: 2019-04-20
Attending: EMERGENCY MEDICINE
Payer: OTHER GOVERNMENT

## 2019-04-20 VITALS
RESPIRATION RATE: 20 BRPM | SYSTOLIC BLOOD PRESSURE: 138 MMHG | DIASTOLIC BLOOD PRESSURE: 83 MMHG | BODY MASS INDEX: 28.25 KG/M2 | HEIGHT: 67 IN | OXYGEN SATURATION: 97 % | WEIGHT: 180 LBS | HEART RATE: 75 BPM | TEMPERATURE: 99 F

## 2019-04-20 DIAGNOSIS — R51.9 NONINTRACTABLE HEADACHE, UNSPECIFIED CHRONICITY PATTERN, UNSPECIFIED HEADACHE TYPE: Primary | ICD-10-CM

## 2019-04-20 PROCEDURE — 99284 EMERGENCY DEPT VISIT MOD MDM: CPT | Mod: 25

## 2019-04-20 PROCEDURE — 96372 THER/PROPH/DIAG INJ SC/IM: CPT

## 2019-04-20 PROCEDURE — 63600175 PHARM REV CODE 636 W HCPCS: Performed by: PHYSICIAN ASSISTANT

## 2019-04-20 RX ORDER — KETOROLAC TROMETHAMINE 30 MG/ML
15 INJECTION, SOLUTION INTRAMUSCULAR; INTRAVENOUS
Status: COMPLETED | OUTPATIENT
Start: 2019-04-20 | End: 2019-04-20

## 2019-04-20 RX ORDER — IBUPROFEN 600 MG/1
600 TABLET ORAL EVERY 6 HOURS PRN
Qty: 20 TABLET | Refills: 0 | Status: SHIPPED | OUTPATIENT
Start: 2019-04-20 | End: 2019-06-03

## 2019-04-20 RX ORDER — PROCHLORPERAZINE EDISYLATE 5 MG/ML
10 INJECTION INTRAMUSCULAR; INTRAVENOUS
Status: COMPLETED | OUTPATIENT
Start: 2019-04-20 | End: 2019-04-20

## 2019-04-20 RX ADMIN — KETOROLAC TROMETHAMINE 15 MG: 30 INJECTION, SOLUTION INTRAMUSCULAR at 12:04

## 2019-04-20 RX ADMIN — PROCHLORPERAZINE EDISYLATE 10 MG: 5 INJECTION INTRAMUSCULAR; INTRAVENOUS at 12:04

## 2019-04-20 NOTE — ED TRIAGE NOTES
Pt c/o headache, lightheadedness, photophobia, nausea x3 days. Pt reports hx of migraines. Denies  Reports nosebleed yesterday. Pain is 7/10. Pt took Excedrin this AM to mild relief

## 2019-04-20 NOTE — ED PROVIDER NOTES
Encounter Date: 2019       History     Chief Complaint   Patient presents with    Headache     pt reports HA x 3days, along with nose bleed yesterday, last took excedrin this morning     43-year-old male with history of migraine headaches complains of frontal headache x3 days of gradual onset.  He describes the pain as constant, worsened by bright lights, and nonradiating.  Associated symptoms include nausea, vision changes of the left eye, both consistent with previous migraine syndrome.  He denies fever, neck stiffness, injury, syncope.  He has been taking Excedrin with no significant relief.        Review of patient's allergies indicates:  No Known Allergies  Past Medical History:   Diagnosis Date    Depression     Heavy smoker (more than 20 cigarettes per day)     History of rectal bleeding     OSH Colonoscopy without polyps.    Migraine headache     Shingles      Past Surgical History:   Procedure Laterality Date    CYST REMOVAL N/A 2013    near heart    GANGLION CYST EXCISION Right     wrist     Family History   Problem Relation Age of Onset    Stroke Mother     Diabetes Father     Hypertension Father     Lung cancer Father     Lung cancer Maternal Grandmother     Lung cancer Maternal Grandfather     Lung cancer Paternal Grandmother     Lung cancer Paternal Grandfather     Lung cancer Maternal Uncle     Lung cancer Maternal Uncle      Social History     Tobacco Use    Smoking status: Former Smoker     Packs/day: 1.00     Start date:      Last attempt to quit: 3/17/2019     Years since quittin.0    Smokeless tobacco: Never Used   Substance Use Topics    Alcohol use: No    Drug use: No     Frequency: 3.0 times per week     Types: Marijuana     Comment: States he quit in 2018.     Review of Systems   Constitutional: Negative for fever.   HENT: Negative for ear pain and sore throat.    Eyes: Positive for photophobia and visual disturbance. Negative for pain.    Respiratory: Negative for shortness of breath.    Cardiovascular: Negative for chest pain.   Gastrointestinal: Positive for nausea. Negative for vomiting.   Genitourinary: Negative for dysuria.   Musculoskeletal: Negative for back pain.   Skin: Negative for rash.   Neurological: Positive for headaches. Negative for dizziness, weakness and numbness.   Hematological: Does not bruise/bleed easily.       Physical Exam     Initial Vitals [04/20/19 1155]   BP Pulse Resp Temp SpO2   134/77 86 18 98.7 °F (37.1 °C) 98 %      MAP       --         Physical Exam    Vitals reviewed.  Constitutional: He appears well-developed and well-nourished. He is not diaphoretic. No distress.   HENT:   Head: Normocephalic and atraumatic.   Right Ear: External ear normal.   Left Ear: External ear normal.   Nose: Nose normal.   Mouth/Throat: Oropharynx is clear and moist. No oropharyngeal exudate.   Eyes: Conjunctivae and EOM are normal. Pupils are equal, round, and reactive to light. No scleral icterus.   Neck: Normal range of motion. Neck supple.   Cardiovascular: Normal rate, regular rhythm and intact distal pulses.   Pulmonary/Chest: No respiratory distress.   Musculoskeletal: Normal range of motion.   Lymphadenopathy:     He has no cervical adenopathy.   Neurological: He is alert and oriented to person, place, and time. He has normal strength. No cranial nerve deficit or sensory deficit. GCS score is 15. GCS eye subscore is 4. GCS verbal subscore is 5. GCS motor subscore is 6.   Skin: Skin is warm and dry. No rash noted. No erythema.         ED Course   Procedures  Labs Reviewed - No data to display       Imaging Results    None          Medical Decision Making:   ED Management:  43-year-old male with history of migraines presents with history exam consistent with primary headache. Low suspicion for serious intracranial pathology, specifically subarachnoid hemorrhage, meningitis, malignancy, or stroke.  He is well-appearing, afebrile,  with vital signs within normal limits. Headache improved with Toradol and Compazine.  Patient is safe for discharge with instructions follow up with PCP follow-up care.  Return precautions given.                      Clinical Impression:       ICD-10-CM ICD-9-CM   1. Nonintractable headache, unspecified chronicity pattern, unspecified headache type R51 784.0                                Jayy Landry PA-C  04/20/19 1423

## 2019-05-03 ENCOUNTER — HOSPITAL ENCOUNTER (OUTPATIENT)
Dept: RADIOLOGY | Facility: OTHER | Age: 44
Discharge: HOME | End: 2019-05-03
Attending: INTERNAL MEDICINE
Payer: OTHER GOVERNMENT

## 2019-05-03 DIAGNOSIS — S89.92XD INJURY OF LIGAMENT OF LEFT KNEE, SUBSEQUENT ENCOUNTER: ICD-10-CM

## 2019-05-03 PROCEDURE — 73562 XR KNEE ORTHO BILAT: ICD-10-PCS | Mod: 26,50,, | Performed by: RADIOLOGY

## 2019-05-03 PROCEDURE — 73562 X-RAY EXAM OF KNEE 3: CPT | Mod: TC,50,FY

## 2019-05-03 PROCEDURE — 73562 X-RAY EXAM OF KNEE 3: CPT | Mod: 26,50,, | Performed by: RADIOLOGY

## 2019-05-04 ENCOUNTER — PATIENT MESSAGE (OUTPATIENT)
Dept: INTERNAL MEDICINE | Facility: CLINIC | Age: 44
End: 2019-05-04

## 2019-05-05 ENCOUNTER — PATIENT MESSAGE (OUTPATIENT)
Dept: INTERNAL MEDICINE | Facility: CLINIC | Age: 44
End: 2019-05-05

## 2019-06-03 ENCOUNTER — OFFICE VISIT (OUTPATIENT)
Dept: INTERNAL MEDICINE | Facility: CLINIC | Age: 44
End: 2019-06-03
Payer: OTHER GOVERNMENT

## 2019-06-03 VITALS
DIASTOLIC BLOOD PRESSURE: 84 MMHG | SYSTOLIC BLOOD PRESSURE: 120 MMHG | HEIGHT: 67 IN | BODY MASS INDEX: 28.55 KG/M2 | OXYGEN SATURATION: 98 % | WEIGHT: 181.88 LBS | HEART RATE: 84 BPM

## 2019-06-03 DIAGNOSIS — R51.9 NONINTRACTABLE HEADACHE, UNSPECIFIED CHRONICITY PATTERN, UNSPECIFIED HEADACHE TYPE: ICD-10-CM

## 2019-06-03 DIAGNOSIS — R74.01 TRANSAMINITIS: ICD-10-CM

## 2019-06-03 DIAGNOSIS — F41.9 ANXIETY: ICD-10-CM

## 2019-06-03 DIAGNOSIS — B02.9 HERPES ZOSTER WITHOUT COMPLICATION: ICD-10-CM

## 2019-06-03 DIAGNOSIS — S89.92XD INJURY OF LIGAMENT OF LEFT KNEE, SUBSEQUENT ENCOUNTER: ICD-10-CM

## 2019-06-03 DIAGNOSIS — K29.00 ACUTE GASTRITIS WITHOUT HEMORRHAGE, UNSPECIFIED GASTRITIS TYPE: ICD-10-CM

## 2019-06-03 DIAGNOSIS — K59.00 CONSTIPATION, UNSPECIFIED CONSTIPATION TYPE: ICD-10-CM

## 2019-06-03 DIAGNOSIS — G47.09 OTHER INSOMNIA: ICD-10-CM

## 2019-06-03 DIAGNOSIS — F33.1 MODERATE EPISODE OF RECURRENT MAJOR DEPRESSIVE DISORDER: ICD-10-CM

## 2019-06-03 DIAGNOSIS — F17.210 MODERATE SMOKER (20 OR LESS PER DAY): ICD-10-CM

## 2019-06-03 PROCEDURE — 99406 BEHAV CHNG SMOKING 3-10 MIN: CPT | Mod: S$PBB,,, | Performed by: INTERNAL MEDICINE

## 2019-06-03 PROCEDURE — 99999 PR PBB SHADOW E&M-EST. PATIENT-LVL IV: CPT | Mod: PBBFAC,,, | Performed by: INTERNAL MEDICINE

## 2019-06-03 PROCEDURE — 99214 OFFICE O/P EST MOD 30 MIN: CPT | Mod: PBBFAC | Performed by: INTERNAL MEDICINE

## 2019-06-03 PROCEDURE — 99215 PR OFFICE/OUTPT VISIT, EST, LEVL V, 40-54 MIN: ICD-10-PCS | Mod: S$PBB,25,, | Performed by: INTERNAL MEDICINE

## 2019-06-03 PROCEDURE — 99215 OFFICE O/P EST HI 40 MIN: CPT | Mod: S$PBB,25,, | Performed by: INTERNAL MEDICINE

## 2019-06-03 PROCEDURE — 99406 PR TOBACCO USE CESSATION INTERMEDIATE 3-10 MINUTES: ICD-10-PCS | Mod: S$PBB,,, | Performed by: INTERNAL MEDICINE

## 2019-06-03 PROCEDURE — 99999 PR PBB SHADOW E&M-EST. PATIENT-LVL IV: ICD-10-PCS | Mod: PBBFAC,,, | Performed by: INTERNAL MEDICINE

## 2019-06-03 RX ORDER — QUETIAPINE FUMARATE 50 MG/1
50 TABLET, FILM COATED ORAL NIGHTLY
COMMUNITY
End: 2019-06-03 | Stop reason: SDUPTHER

## 2019-06-03 RX ORDER — PANTOPRAZOLE SODIUM 40 MG/1
40 TABLET, DELAYED RELEASE ORAL 2 TIMES DAILY
COMMUNITY
End: 2019-06-24

## 2019-06-03 RX ORDER — FLUOXETINE HYDROCHLORIDE 40 MG/1
40 CAPSULE ORAL DAILY
Qty: 90 CAPSULE | Refills: 1 | Status: CANCELLED | OUTPATIENT
Start: 2019-06-03

## 2019-06-03 RX ORDER — FLUOXETINE HYDROCHLORIDE 20 MG/1
20 CAPSULE ORAL DAILY
Qty: 90 CAPSULE | Refills: 1 | Status: CANCELLED | OUTPATIENT
Start: 2019-06-03

## 2019-06-03 RX ORDER — FLUOXETINE HYDROCHLORIDE 20 MG/1
20 CAPSULE ORAL DAILY
COMMUNITY
End: 2019-06-11 | Stop reason: SDUPTHER

## 2019-06-03 RX ORDER — QUETIAPINE FUMARATE 200 MG/1
200 TABLET, FILM COATED ORAL NIGHTLY
Qty: 90 TABLET | Refills: 1 | Status: ON HOLD | OUTPATIENT
Start: 2019-06-03 | End: 2019-07-07 | Stop reason: HOSPADM

## 2019-06-03 RX ORDER — TRAZODONE HYDROCHLORIDE 150 MG/1
150 TABLET ORAL NIGHTLY
Qty: 90 TABLET | Refills: 1 | Status: CANCELLED | OUTPATIENT
Start: 2019-06-03 | End: 2019-09-01

## 2019-06-03 NOTE — PATIENT INSTRUCTIONS
Other psychiatrists:  Dr. Amando Holder - [phone]  Dr. Anthony Pearce - [phone]  Dr. Shari Jose - [phone]  Dr. Gosia Welch - [phone]  Dr. Stevie Soto - [phone]    Psychology at [facility].
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
## 2019-06-11 ENCOUNTER — PATIENT MESSAGE (OUTPATIENT)
Dept: OPTOMETRY | Facility: CLINIC | Age: 44
End: 2019-06-11

## 2019-06-11 ENCOUNTER — PATIENT MESSAGE (OUTPATIENT)
Dept: INTERNAL MEDICINE | Facility: CLINIC | Age: 44
End: 2019-06-11

## 2019-06-11 DIAGNOSIS — F33.1 MODERATE EPISODE OF RECURRENT MAJOR DEPRESSIVE DISORDER: Primary | ICD-10-CM

## 2019-06-11 RX ORDER — FLUOXETINE HYDROCHLORIDE 40 MG/1
40 CAPSULE ORAL DAILY
Qty: 90 CAPSULE | Refills: 1 | Status: SHIPPED | OUTPATIENT
Start: 2019-06-11 | End: 2019-08-05 | Stop reason: ALTCHOICE

## 2019-06-24 ENCOUNTER — OFFICE VISIT (OUTPATIENT)
Dept: PSYCHIATRY | Facility: CLINIC | Age: 44
End: 2019-06-24
Payer: OTHER GOVERNMENT

## 2019-06-24 ENCOUNTER — HOSPITAL ENCOUNTER (EMERGENCY)
Facility: HOSPITAL | Age: 44
Discharge: PSYCHIATRIC OR SUBSTANCE USE FACILITY | End: 2019-06-24
Attending: EMERGENCY MEDICINE
Payer: OTHER GOVERNMENT

## 2019-06-24 VITALS
BODY MASS INDEX: 28.27 KG/M2 | WEIGHT: 180.13 LBS | SYSTOLIC BLOOD PRESSURE: 122 MMHG | HEART RATE: 93 BPM | DIASTOLIC BLOOD PRESSURE: 80 MMHG | HEIGHT: 67 IN

## 2019-06-24 VITALS
TEMPERATURE: 98 F | RESPIRATION RATE: 18 BRPM | WEIGHT: 190 LBS | OXYGEN SATURATION: 95 % | DIASTOLIC BLOOD PRESSURE: 84 MMHG | HEIGHT: 67 IN | SYSTOLIC BLOOD PRESSURE: 117 MMHG | BODY MASS INDEX: 29.82 KG/M2 | HEART RATE: 71 BPM

## 2019-06-24 DIAGNOSIS — R45.851 DEPRESSION WITH SUICIDAL IDEATION: Primary | ICD-10-CM

## 2019-06-24 DIAGNOSIS — F33.3 SEVERE RECURRENT MAJOR DEPRESSIVE DISORDER WITH PSYCHOTIC FEATURES WITH ANXIOUS DISTRESS: Primary | ICD-10-CM

## 2019-06-24 DIAGNOSIS — F32.A DEPRESSION WITH SUICIDAL IDEATION: Primary | ICD-10-CM

## 2019-06-24 DIAGNOSIS — R45.851 DEPRESSION WITH SUICIDAL IDEATION: ICD-10-CM

## 2019-06-24 DIAGNOSIS — F32.A DEPRESSION WITH SUICIDAL IDEATION: ICD-10-CM

## 2019-06-24 PROBLEM — F32.9 MAJOR DEPRESSION: Status: ACTIVE | Noted: 2019-06-24

## 2019-06-24 LAB
ALBUMIN SERPL BCP-MCNC: 4.2 G/DL (ref 3.5–5.2)
ALP SERPL-CCNC: 74 U/L (ref 55–135)
ALT SERPL W/O P-5'-P-CCNC: 13 U/L (ref 10–44)
AMPHET+METHAMPHET UR QL: NEGATIVE
ANION GAP SERPL CALC-SCNC: 9 MMOL/L (ref 8–16)
APAP SERPL-MCNC: <3 UG/ML (ref 10–20)
AST SERPL-CCNC: 16 U/L (ref 10–40)
BARBITURATES UR QL SCN>200 NG/ML: NEGATIVE
BASOPHILS # BLD AUTO: 0.02 K/UL (ref 0–0.2)
BASOPHILS NFR BLD: 0.2 % (ref 0–1.9)
BENZODIAZ UR QL SCN>200 NG/ML: NEGATIVE
BILIRUB SERPL-MCNC: 0.8 MG/DL (ref 0.1–1)
BILIRUB UR QL STRIP: NEGATIVE
BUN SERPL-MCNC: 15 MG/DL (ref 6–20)
BZE UR QL SCN: NEGATIVE
CALCIUM SERPL-MCNC: 9.2 MG/DL (ref 8.7–10.5)
CANNABINOIDS UR QL SCN: NEGATIVE
CHLORIDE SERPL-SCNC: 110 MMOL/L (ref 95–110)
CLARITY UR: CLEAR
CO2 SERPL-SCNC: 20 MMOL/L (ref 23–29)
COLOR UR: YELLOW
CREAT SERPL-MCNC: 1 MG/DL (ref 0.5–1.4)
CREAT UR-MCNC: 173.8 MG/DL (ref 23–375)
DIFFERENTIAL METHOD: ABNORMAL
EOSINOPHIL # BLD AUTO: 0.1 K/UL (ref 0–0.5)
EOSINOPHIL NFR BLD: 1.4 % (ref 0–8)
ERYTHROCYTE [DISTWIDTH] IN BLOOD BY AUTOMATED COUNT: 14.6 % (ref 11.5–14.5)
EST. GFR  (AFRICAN AMERICAN): >60 ML/MIN/1.73 M^2
EST. GFR  (NON AFRICAN AMERICAN): >60 ML/MIN/1.73 M^2
ETHANOL SERPL-MCNC: <10 MG/DL
GLUCOSE SERPL-MCNC: 95 MG/DL (ref 70–110)
GLUCOSE UR QL STRIP: NEGATIVE
HCT VFR BLD AUTO: 47.1 % (ref 40–54)
HGB BLD-MCNC: 16.6 G/DL (ref 14–18)
HGB UR QL STRIP: NEGATIVE
KETONES UR QL STRIP: NEGATIVE
LEUKOCYTE ESTERASE UR QL STRIP: NEGATIVE
LYMPHOCYTES # BLD AUTO: 1.2 K/UL (ref 1–4.8)
LYMPHOCYTES NFR BLD: 12.5 % (ref 18–48)
MCH RBC QN AUTO: 32.5 PG (ref 27–31)
MCHC RBC AUTO-ENTMCNC: 35.2 G/DL (ref 32–36)
MCV RBC AUTO: 92 FL (ref 82–98)
METHADONE UR QL SCN>300 NG/ML: NEGATIVE
MONOCYTES # BLD AUTO: 0.9 K/UL (ref 0.3–1)
MONOCYTES NFR BLD: 9.5 % (ref 4–15)
NEUTROPHILS # BLD AUTO: 7.1 K/UL (ref 1.8–7.7)
NEUTROPHILS NFR BLD: 76.5 % (ref 38–73)
NITRITE UR QL STRIP: NEGATIVE
OPIATES UR QL SCN: NEGATIVE
PCP UR QL SCN>25 NG/ML: NEGATIVE
PH UR STRIP: 7 [PH] (ref 5–8)
PLATELET # BLD AUTO: 268 K/UL (ref 150–350)
PMV BLD AUTO: 11 FL (ref 9.2–12.9)
POTASSIUM SERPL-SCNC: 4.1 MMOL/L (ref 3.5–5.1)
PROT SERPL-MCNC: 7.1 G/DL (ref 6–8.4)
PROT UR QL STRIP: NEGATIVE
RBC # BLD AUTO: 5.1 M/UL (ref 4.6–6.2)
SALICYLATES SERPL-MCNC: <5 MG/DL (ref 15–30)
SODIUM SERPL-SCNC: 139 MMOL/L (ref 136–145)
SP GR UR STRIP: 1.02 (ref 1–1.03)
TOXICOLOGY INFORMATION: NORMAL
TSH SERPL DL<=0.005 MIU/L-ACNC: 1.54 UIU/ML (ref 0.4–4)
URN SPEC COLLECT METH UR: NORMAL
UROBILINOGEN UR STRIP-ACNC: NEGATIVE EU/DL
WBC # BLD AUTO: 9.34 K/UL (ref 3.9–12.7)

## 2019-06-24 PROCEDURE — 80329 ANALGESICS NON-OPIOID 1 OR 2: CPT

## 2019-06-24 PROCEDURE — 25000003 PHARM REV CODE 250: Performed by: EMERGENCY MEDICINE

## 2019-06-24 PROCEDURE — 99204 PR OFFICE/OUTPT VISIT, NEW, LEVL IV, 45-59 MIN: ICD-10-PCS | Mod: S$PBB,,, | Performed by: NURSE PRACTITIONER

## 2019-06-24 PROCEDURE — 81003 URINALYSIS AUTO W/O SCOPE: CPT | Mod: 59

## 2019-06-24 PROCEDURE — 99204 OFFICE O/P NEW MOD 45 MIN: CPT | Mod: S$PBB,,, | Performed by: NURSE PRACTITIONER

## 2019-06-24 PROCEDURE — 96372 THER/PROPH/DIAG INJ SC/IM: CPT

## 2019-06-24 PROCEDURE — 84443 ASSAY THYROID STIM HORMONE: CPT

## 2019-06-24 PROCEDURE — 85025 COMPLETE CBC W/AUTO DIFF WBC: CPT

## 2019-06-24 PROCEDURE — 99213 OFFICE O/P EST LOW 20 MIN: CPT | Mod: PBBFAC,25 | Performed by: NURSE PRACTITIONER

## 2019-06-24 PROCEDURE — 63600175 PHARM REV CODE 636 W HCPCS: Performed by: EMERGENCY MEDICINE

## 2019-06-24 PROCEDURE — 99999 PR PBB SHADOW E&M-EST. PATIENT-LVL III: ICD-10-PCS | Mod: PBBFAC,,, | Performed by: NURSE PRACTITIONER

## 2019-06-24 PROCEDURE — 80307 DRUG TEST PRSMV CHEM ANLYZR: CPT

## 2019-06-24 PROCEDURE — 99285 EMERGENCY DEPT VISIT HI MDM: CPT | Mod: 25,27

## 2019-06-24 PROCEDURE — 80320 DRUG SCREEN QUANTALCOHOLS: CPT

## 2019-06-24 PROCEDURE — 99999 PR PBB SHADOW E&M-EST. PATIENT-LVL III: CPT | Mod: PBBFAC,,, | Performed by: NURSE PRACTITIONER

## 2019-06-24 PROCEDURE — 80053 COMPREHEN METABOLIC PANEL: CPT

## 2019-06-24 RX ORDER — LORAZEPAM 0.5 MG/1
4 TABLET ORAL
Status: COMPLETED | OUTPATIENT
Start: 2019-06-24 | End: 2019-06-24

## 2019-06-24 RX ORDER — ZIPRASIDONE MESYLATE 20 MG/ML
20 INJECTION, POWDER, LYOPHILIZED, FOR SOLUTION INTRAMUSCULAR
Status: COMPLETED | OUTPATIENT
Start: 2019-06-24 | End: 2019-06-24

## 2019-06-24 RX ORDER — DIPHENHYDRAMINE HCL 50 MG
50 CAPSULE ORAL
Status: COMPLETED | OUTPATIENT
Start: 2019-06-24 | End: 2019-06-24

## 2019-06-24 RX ADMIN — LORAZEPAM 4 MG: 0.5 TABLET ORAL at 10:06

## 2019-06-24 RX ADMIN — ZIPRASIDONE MESYLATE 20 MG: 20 INJECTION, POWDER, LYOPHILIZED, FOR SOLUTION INTRAMUSCULAR at 10:06

## 2019-06-24 RX ADMIN — DIPHENHYDRAMINE HYDROCHLORIDE 50 MG: 50 CAPSULE ORAL at 10:06

## 2019-06-24 NOTE — ED NOTES
Admit packet faxed to [facility], and [facility].

## 2019-06-24 NOTE — ED TRIAGE NOTES
Pt arrived to ED escorted by clinical staff. Today in clinic, pt reported depression and SI. He states he purchased aspirin with intent to overdose. Pt denies taking the medication. He also reports auditory hallucinations telling him to harm himself. He denies any HI. Pt is AAO x 4. He denies any other symptoms at this time.

## 2019-06-24 NOTE — PROGRESS NOTES
Outpatient Psychiatry Initial Visit (MD/NP)    6/24/2019    Donell Park, a 43 y.o. male, presenting for initial evaluation visit. Met with patient.    Reason for Encounter: Referral from Dr. Resendiz. Patient complains of   Chief Complaint   Patient presents with    Suicidal     New Paitent    .    History of Present Illness: Donell Park is a 43 y.o. male that presents for an initial psychiatric evaluation. A review of Donell's chart reports that Donell has a past medical history of Attempted suicide (06/20/19) and Depression. Per Dr. Resendiz's note Donell has been on multiple unknown medications which he discontinued about 10 years ago due to weight gain, feelings of anger and fatigue. Approximately 3 weeks ago he attempted suicide by taking 200 ASA.  He was found by a stranger after a missing persons report.  He was brought to the ED at [facility] where he was treated. He was eventually discharged to [facility]. Today he states he stayed there for a week and was put on Fluoxetine and Seroquel. He states that he is still taking this but it does not work.      Today he states that he is hearing voices telling him to kill himself by overdosing on ASA. He states he tried before with ASA and he bought the ASA this morning, it is outside in his truck and he is going to kill himself today. He is presently prescribed Prozac 40 mgs and Seroquel 200 mgs. He has problems with sleep but can sleep with 200 mgs of Seroquel. His chart shows that he reported occasional passive SI when he forgot to take his Prozac for 1 day and as an intervention he called family members. He states that the suicide attempt with the ASA overdose was not his first attempt. He states he tried 2 days prior with sleeping pills but he threw up all day.      Donell Park states that he is actively suicidal with a plan to overdose on ASA which he has bought and is in his truck at the present time. He is afraid of  "what he is going to do. He cries. He is fidgeting in his chair. He has poor eye contact.    In the past Donell remembers taking: Trazodone 150 mgs po qhs which was effective for mood and sleep, 70-80 mg of melatonin to fall asleep for several years.    His overall symptom complex includes: poor sleep, racing thoughts, poor concentration, anhedonia, feelings of guilt, visual hallucinations: shadows, auditory hallucinations: hearing voices telling him to harm himself by overdosing on ASA , suicidal ideation with a plan to overdose of asa today (just bought ASA and it is in his truck), past history of suicide attempt x 2.     Patient is PEC'd. Security here. Walked patient to [facility] ED with Security. Report given to Dr. Cunningham in ED.     Review Of Systems:     GENERAL:  Well developed   SKIN:  No rashes or lacerations, tatoos to UEs bilaterally and to right lower extremity  HEAD:  No headache  EYES:  No exophthalmos, jaundice or blindness  EARS:  No hearing loss  MOUTH & THROAT:  No dyskinetic movements or obvious goiter  CHEST:  No shortness of breath  CARDIOVASCULAR:  No chest pain  ABDOMEN:  No nausea, vomiting, pain, constipation or diarrhea  URINARY:  No dysuria or sexual dysfunction  MUSCULOSKELETAL:  No tremor, no tic  NEUROLOGIC:  No abnormal movements    Current Evaluation:     Nutritional Screening: Considering the patient's height and weight, medications, medical history and preferences, should a referral be made to the dietitian? no    Constitutional  Vitals:  Most recent vital signs, dated less than 90 days prior to this appointment, were reviewed.    Vitals:    06/24/19 0813   BP: 122/80   Pulse: 93   Weight: 81.7 kg (180 lb 1.9 oz)   Height: 5' 7" (1.702 m)        General:  unremarkable, age appropriate     Musculoskeletal  Muscle Strength/Tone:  no tremor, no tic   Gait & Station:  non-ataxic     Psychiatric  Speech:  no latency; no press   Mood & Affect:  "scared"  anxious   Thought " Process:  normal and logical   Associations:  intact   Thought Content:  suicidal thoughts: (active-yes), denies homicidal ideation, endorses SI with plan to overdose on ASA, endorses visual (shadows) hallucinations and command hallucinations (telling him to overdose to kill himself)   Insight:  has awareness of illness   Judgement: behavior is adequate to circumstances   Orientation:  grossly intact, person, place, situation   Memory: intact for content of interview   Language: grossly intact   Attention Span & Concentration:  able to focus   Fund of Knowledge:  intact and appropriate to age and level of education       Relevant Elements of Neurological Exam: normal gait    Functioning in Relationships:  Spouse/partner: Poor  Peers: poor  Employers: Good    Laboratory Data  No visits with results within 1 Month(s) from this visit.   Latest known visit with results is:   Admission on 02/23/2019, Discharged on 02/23/2019   Component Date Value Ref Range Status    WBC 02/23/2019 8.21  3.90 - 12.70 K/uL Final    RBC 02/23/2019 4.82  4.60 - 6.20 M/uL Final    Hemoglobin 02/23/2019 15.5  14.0 - 18.0 g/dL Final    Hematocrit 02/23/2019 43.4  40.0 - 54.0 % Final    Mean Corpuscular Volume 02/23/2019 90  82 - 98 fL Final    Mean Corpuscular Hemoglobin 02/23/2019 32.2* 27.0 - 31.0 pg Final    Mean Corpuscular Hemoglobin Conc 02/23/2019 35.7  32.0 - 36.0 g/dL Final    RDW 02/23/2019 14.0  11.5 - 14.5 % Final    Platelets 02/23/2019 266  150 - 350 K/uL Final    MPV 02/23/2019 10.8  9.2 - 12.9 fL Final    Gran # (ANC) 02/23/2019 4.0  1.8 - 7.7 K/uL Final    Lymph # 02/23/2019 2.5  1.0 - 4.8 K/uL Final    Mono # 02/23/2019 1.3* 0.3 - 1.0 K/uL Final    Eos # 02/23/2019 0.4  0.0 - 0.5 K/uL Final    Baso # 02/23/2019 0.04  0.00 - 0.20 K/uL Final    Gran% 02/23/2019 49.2  38.0 - 73.0 % Final    Lymph% 02/23/2019 30.7  18.0 - 48.0 % Final    Mono% 02/23/2019 15.2* 4.0 - 15.0 % Final    Eosinophil% 02/23/2019 4.4   0.0 - 8.0 % Final    Basophil% 02/23/2019 0.5  0.0 - 1.9 % Final    Differential Method 02/23/2019 Automated   Final    Sodium 02/23/2019 141  136 - 145 mmol/L Final    Potassium 02/23/2019 3.5  3.5 - 5.1 mmol/L Final    Chloride 02/23/2019 109  95 - 110 mmol/L Final    CO2 02/23/2019 20* 23 - 29 mmol/L Final    Glucose 02/23/2019 88  70 - 110 mg/dL Final    BUN, Bld 02/23/2019 14  6 - 20 mg/dL Final    Creatinine 02/23/2019 0.9  0.5 - 1.4 mg/dL Final    Calcium 02/23/2019 9.4  8.7 - 10.5 mg/dL Final    Total Protein 02/23/2019 6.8  6.0 - 8.4 g/dL Final    Albumin 02/23/2019 4.3  3.5 - 5.2 g/dL Final    Total Bilirubin 02/23/2019 0.9  0.1 - 1.0 mg/dL Final    Alkaline Phosphatase 02/23/2019 58  55 - 135 U/L Final    AST 02/23/2019 17  10 - 40 U/L Final    ALT 02/23/2019 16  10 - 44 U/L Final    Anion Gap 02/23/2019 12  8 - 16 mmol/L Final    eGFR if  02/23/2019 >60  >60 mL/min/1.73 m^2 Final    eGFR if non African American 02/23/2019 >60  >60 mL/min/1.73 m^2 Final    Lipase 02/23/2019 21  4 - 60 U/L Final    Specimen UA 02/23/2019 Urine, Clean Catch   Final    Color, UA 02/23/2019 Yellow  Yellow, Straw, Rosalia Final    Appearance, UA 02/23/2019 Clear  Clear Final    pH, UA 02/23/2019 5.0  5.0 - 8.0 Final    Specific Gravity, UA 02/23/2019 >1.030* 1.005 - 1.030 Final    Protein, UA 02/23/2019 Negative  Negative Final    Glucose, UA 02/23/2019 Negative  Negative Final    Ketones, UA 02/23/2019 1+* Negative Final    Bilirubin (UA) 02/23/2019 Negative  Negative Final    Occult Blood UA 02/23/2019 Negative  Negative Final    Nitrite, UA 02/23/2019 Negative  Negative Final    Urobilinogen, UA 02/23/2019 4.0-6.0* <2.0 EU/dL Final    Leukocytes, UA 02/23/2019 Negative  Negative Final    Prothrombin Time 02/23/2019 10.1  9.0 - 12.5 sec Final    INR 02/23/2019 1.0  0.8 - 1.2 Final    aPTT 02/23/2019 28.5  21.0 - 32.0 sec Final    Group & Rh 02/23/2019 A POS   Final     Indirect Keily 02/23/2019 NEG   Final         Medications  Outpatient Encounter Medications as of 6/24/2019   Medication Sig Dispense Refill    aspirin/acetaminophen/caffeine (EXCEDRIN MIGRAINE ORAL) Take by mouth.      FLUoxetine 40 MG capsule Take 1 capsule (40 mg total) by mouth once daily. 90 capsule 1    hydrocortisone 2.5 % cream Apply topically 2 (two) times daily. 20 g 3    pantoprazole (PROTONIX) 40 MG tablet Take 40 mg by mouth 2 (two) times daily.      QUEtiapine (SEROQUEL) 200 MG Tab Take 1 tablet (200 mg total) by mouth nightly. 90 tablet 1    traMADol (ULTRAM) 50 mg tablet Take 1 tablet (50 mg total) by mouth every 6 (six) hours as needed for Pain. 30 tablet 0     No facility-administered encounter medications on file as of 6/24/2019.        Assessment - Diagnosis - Goals:     Impression: Donell Park is suicidal with a plan to overdose on ASA. He is having command hallucinations telling him to kill himself by ASA overdose.       ICD-10-CM ICD-9-CM   1. Severe recurrent major depressive disorder with psychotic features with anxious distress F33.3 296.34   2. Depression with suicidal ideation F32.9 311    R45.851 V62.84       Strengths and Liabilities: Strength: Patient is expressive/articulate., Strength: Patient is motivated for change., Liability: Patient lacks coping skills.    Treatment Goals:  Specify outcomes written in observable, behavioral terms:   Instructed to call 911 or go to ER for suicidal ideation, adverse effects of medication or any other emergency. Verbalizes understanding and plan to comply.  Anxiety: reducing negative automatic thoughts  Depression: reducing negative automatic thoughts; will remain safe and will not try to kill self    Treatment Plan/Recommendations:   PEC      Return to Clinic: as needed    Counseling time: 20 minutes  Total time: 45 minutes  Consulting clinician was informed of the encounter and consult note.

## 2019-06-24 NOTE — ED NOTES
CEC received in Centralized Placement.  Patient already accepted at [facility].  Please send original copies of PEC and CEC when patient is transported.

## 2019-06-24 NOTE — ED NOTES
Patient brother joycelyn trotter [phone]  Patient   belongings were given to the brother per the patient car keys, wallet, bank cards, ID

## 2019-06-24 NOTE — ED NOTES
Patient accepted by Bibi at [facility] ([address], [place], [place]) for the service of Mica Damon NP.Report to be called to [phone].

## 2019-06-24 NOTE — ED PROVIDER NOTES
Encounter Date: 6/24/2019       History     Chief Complaint   Patient presents with    Suicidal     SI - active plan to overdose on ASA.  Pt PEC'd.     HPI   This 43-year-old male presents to the emergency room complaining of suicidal ideation.  The patient plan to overdose on aspirin.  The aspirin is in his truck outside.  The patient has attempted suicide in the past.  He has a history of depressions and hallucinations.  He is suicidal today.  His some mild pain in his right ear.  He has some mild right occipital head pain.  Review of patient's allergies indicates:  No Known Allergies  Past Medical History:   Diagnosis Date    Acute gastritis without hemorrhage 06/03/2019    aspirin induced    ADHD (attention deficit hyperactivity disorder)     [facility]    Anxiety     Attempted suicide 06/20/19    pt stated attempted 2 weeks ago- aspirin    Depression     Hallucination     Heavy smoker (more than 20 cigarettes per day)     History of psychiatric hospitalization     History of rectal bleeding 2012    [facility]H Colonoscopy without polyps.    Hx of psychiatric care     Major depression 6/24/2019    Major depressive disorder, recurrent, severe with psychotic features 6/25/2019    Migraine headache     Psychiatric problem     PTSD (post-traumatic stress disorder) 6/25/2019    Shingles 2013    Suicide attempt     attempted twice    Therapy      Past Surgical History:   Procedure Laterality Date    CYST REMOVAL N/A 2013    near heart    GANGLION CYST EXCISION Right     wrist     Family History   Problem Relation Age of Onset    Stroke Mother     Diabetes Father     Hypertension Father     Lung cancer Father     Lung cancer Maternal Grandmother     Lung cancer Maternal Grandfather     Lung cancer Paternal Grandmother     Lung cancer Paternal Grandfather     Lung cancer Maternal Uncle     Lung cancer Maternal Uncle     Depression Maternal Aunt      Social  History     Tobacco Use    Smoking status: Current Every Day Smoker     Packs/day: 1.00     Start date: 1997    Smokeless tobacco: Never Used   Substance Use Topics    Alcohol use: No    Drug use: No     Frequency: 3.0 times per week     Comment: States he quit in 2018.     Review of Systems  The patient was questioned specifically with regard to the following.  General: Fever, chills, sweats. Neuro: Headache. Eyes: eye problems. ENT: Ear pain, sore throat. Cardiovascular: Chest pain. Respiratory: Cough, shortness of breath. Gastrointestinal: Abdominal pain, vomiting, diarrhea. Genitourinary: Painful urination.  Musculoskeletal: Arm and leg problems. Skin: Rash.  The review of systems was negative except for the following:  Right ear pain, right occipital headache, suicidal ideation  Physical Exam     Initial Vitals [06/24/19 0948]   BP Pulse Resp Temp SpO2   (!) 151/98 80 18 97.7 °F (36.5 °C) 98 %      MAP       --         Physical Exam  The patient was examined specifically for the following:   General:No significant distress, Good color, Warm and dry. Head and neck:Scalp atraumatic, Neck supple. Neurological:Appropriate conversation, Gross motor deficits. Eyes:Conjugate gaze, Clear corneas. ENT: No epistaxis. Cardiac: Regular rate and rhythm, Grossly normal heart tones. Pulmonary: Wheezing, Rales. Gastrointestinal: Abdominal tenderness, Abdominal distention. Musculoskeletal: Extremity deformity, Apparent pain with range of motion of the joints. Skin: Rash.   The findings on examination were normal except for the following:  The patient seems agitated and anxious.  The lungs are clear.  The heart tones are normal.  The abdomen is soft.  ED Course   Procedures  Labs Reviewed   CBC W/ AUTO DIFFERENTIAL - Abnormal; Notable for the following components:       Result Value    Mean Corpuscular Hemoglobin 32.5 (*)     RDW 14.6 (*)     Gran% 76.5 (*)     Lymph% 12.5 (*)     All other components within normal limits    COMPREHENSIVE METABOLIC PANEL - Abnormal; Notable for the following components:    CO2 20 (*)     All other components within normal limits   ACETAMINOPHEN LEVEL - Abnormal; Notable for the following components:    Acetaminophen (Tylenol), Serum <3.0 (*)     All other components within normal limits   SALICYLATE LEVEL - Abnormal; Notable for the following components:    Salicylate Lvl <5.0 (*)     All other components within normal limits   TSH   URINALYSIS, REFLEX TO URINE CULTURE    Narrative:     Preferred Collection Type->Urine, Clean Catch   DRUG SCREEN PANEL, URINE EMERGENCY    Narrative:     Preferred Collection Type->Urine, Clean Catch   ALCOHOL,MEDICAL (ETHANOL)          Imaging Results    None       Medical decision making:  Given the above, this patient presents to the emergency room with suicidal ideations.  He has a plan.  He has knee aspirin in his truck.  He will be PECd to psychiatric therapy.  The patient seems anxious and agitated.  Was treated with Geodon and Ativan and Benadryl in the emergency room.                          Clinical Impression:       ICD-10-CM ICD-9-CM   1. Depression with suicidal ideation F32.9 311    R45.851 V62.84                                Alonzo Cunningham MD  08/08/19 0831

## 2019-06-25 PROBLEM — F32.9 MAJOR DEPRESSION: Status: RESOLVED | Noted: 2019-06-24 | Resolved: 2019-06-25

## 2019-06-25 PROBLEM — F33.3 MAJOR DEPRESSIVE DISORDER, RECURRENT, SEVERE WITH PSYCHOTIC FEATURES: Status: ACTIVE | Noted: 2019-06-25

## 2019-06-25 PROBLEM — R03.0 ELEVATED BLOOD PRESSURE READING IN OFFICE WITHOUT DIAGNOSIS OF HYPERTENSION: Status: ACTIVE | Noted: 2019-06-25

## 2019-06-25 PROBLEM — F43.10 PTSD (POST-TRAUMATIC STRESS DISORDER): Status: ACTIVE | Noted: 2019-06-25

## 2019-07-08 ENCOUNTER — OFFICE VISIT (OUTPATIENT)
Dept: INTERNAL MEDICINE | Facility: CLINIC | Age: 44
End: 2019-07-08
Payer: OTHER GOVERNMENT

## 2019-07-08 VITALS
HEIGHT: 67 IN | SYSTOLIC BLOOD PRESSURE: 130 MMHG | BODY MASS INDEX: 29.72 KG/M2 | HEART RATE: 105 BPM | WEIGHT: 189.38 LBS | DIASTOLIC BLOOD PRESSURE: 90 MMHG

## 2019-07-08 DIAGNOSIS — H60.501 ACUTE OTITIS EXTERNA OF RIGHT EAR, UNSPECIFIED TYPE: ICD-10-CM

## 2019-07-08 DIAGNOSIS — R51.9 NONINTRACTABLE HEADACHE, UNSPECIFIED CHRONICITY PATTERN, UNSPECIFIED HEADACHE TYPE: ICD-10-CM

## 2019-07-08 DIAGNOSIS — R03.0 ELEVATED BLOOD PRESSURE READING IN OFFICE WITHOUT DIAGNOSIS OF HYPERTENSION: ICD-10-CM

## 2019-07-08 DIAGNOSIS — F41.9 ANXIETY: ICD-10-CM

## 2019-07-08 DIAGNOSIS — S89.92XD INJURY OF LIGAMENT OF LEFT KNEE, SUBSEQUENT ENCOUNTER: ICD-10-CM

## 2019-07-08 DIAGNOSIS — F90.9 ATTENTION DEFICIT HYPERACTIVITY DISORDER (ADHD), UNSPECIFIED ADHD TYPE: ICD-10-CM

## 2019-07-08 DIAGNOSIS — K59.00 CONSTIPATION, UNSPECIFIED CONSTIPATION TYPE: ICD-10-CM

## 2019-07-08 DIAGNOSIS — G47.09 OTHER INSOMNIA: ICD-10-CM

## 2019-07-08 DIAGNOSIS — R06.83 SNORING: ICD-10-CM

## 2019-07-08 DIAGNOSIS — F17.210 MODERATE SMOKER (20 OR LESS PER DAY): ICD-10-CM

## 2019-07-08 DIAGNOSIS — F33.1 MODERATE EPISODE OF RECURRENT MAJOR DEPRESSIVE DISORDER: Primary | ICD-10-CM

## 2019-07-08 PROBLEM — K29.00 ACUTE GASTRITIS WITHOUT HEMORRHAGE: Status: RESOLVED | Noted: 2019-06-03 | Resolved: 2019-07-08

## 2019-07-08 PROCEDURE — 99213 OFFICE O/P EST LOW 20 MIN: CPT | Mod: PBBFAC | Performed by: INTERNAL MEDICINE

## 2019-07-08 PROCEDURE — 99999 PR PBB SHADOW E&M-EST. PATIENT-LVL III: CPT | Mod: PBBFAC,,, | Performed by: INTERNAL MEDICINE

## 2019-07-08 PROCEDURE — 99215 PR OFFICE/OUTPT VISIT, EST, LEVL V, 40-54 MIN: ICD-10-PCS | Mod: S$PBB,,, | Performed by: INTERNAL MEDICINE

## 2019-07-08 PROCEDURE — 99215 OFFICE O/P EST HI 40 MIN: CPT | Mod: S$PBB,,, | Performed by: INTERNAL MEDICINE

## 2019-07-08 PROCEDURE — 99999 PR PBB SHADOW E&M-EST. PATIENT-LVL III: ICD-10-PCS | Mod: PBBFAC,,, | Performed by: INTERNAL MEDICINE

## 2019-07-08 RX ORDER — CIPROFLOXACIN 0.5 MG/.25ML
3 SOLUTION/ DROPS AURICULAR (OTIC) 2 TIMES DAILY
Qty: 10 EACH | Refills: 0 | Status: SHIPPED | OUTPATIENT
Start: 2019-07-08 | End: 2019-07-15
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
## 2019-08-02 NOTE — PROGRESS NOTES
Outpatient Psychiatry Follow-Up Visit (MD/NP)    8/5/2019    Clinical Status of Patient:  Outpatient (Ambulatory)    Chief Complaint:  Donell Park is a 43 y.o. male who presents today for follow-up of depression, anxiety and psychosis and suicidal ideation, post hospitalization.  Met with patient.      Interval History and Content of Current Session:  Interim Events/Subjective Report/Content of Current Session: Donell  was last seen by me on 06/24/2019 for an initial psychiatric evaluation. Donell was diagnosed with MDD, recurrent, severe with psychotic features with anxious distress and depression with suicidal ideation and a plan of care was devised to include:    1. PEC    Today Donell is seen face to face. He was PEC 'd on his last visit due to suicidal ideation and an inability to contract for safety. He was admitted to [facility]. A review of the chart shows that he was discharged on Prozac 30 mg daily, Abilify 15mg daily, Quetiapine 200 mg PO QHS, Mirtazapine 30 mg PO QHS for insomnia and Vistaril PRN for anxiety. A review of the chart shows that he went to his PCP on 07/08/2019 for a psychiatric hospitalization follow up and requested Adderall for ADHD and was referred back to psychiatry.      Today he states that he stayed in the hospital for 14 days and it took 9 days to get his medication regulated. His depression is under control. His psychotic features are gone. His anxiety goes and comes and he smokes THCA to help. He states he last smoked last night. He has also used edibles in the past and this has helped. He would like something PRN for anxiety. His sleep is good. His appetite is okay and his energy level is good. He is advised that there is no evidence noted on his initial visit to support a diagnosis of ADHD and Adderall is contraindicated given his last elevated blood pressure reading, history of anxiety and history of psychosis. He has noticed that he has no  sex drive since originally starting Prozac. He states that he drinks Red Bull, sugar-free. Instructed on risks for anxiety and even heart attack with this drink. Verbalizes understanding.     Psychotherapy:  · Target symptoms: depression, anxiety , psychotic features  · Why chosen therapy is appropriate versus another modality: relevant to diagnosis, evidence based practice  · Outcome monitoring methods: self-report, observation  · Therapeutic intervention type: supportive psychotherapy  · Topics discussed/themes: work, medications, support for illness, therapy  · The patient's response to the intervention is accepting. The patient's progress toward treatment goals is good.   · Duration of intervention: 21 minutes.    Review of Systems   PSYCHIATRIC: Pertinant items are noted in the narrative.  GENERAL:  Well developed   SKIN:  No rashes or lacerations, tatoos to UEs bilaterally and to right lower extremity  HEAD:  No headache  EYES:  No exophthalmos, jaundice or blindness  EARS:  No hearing loss  MOUTH & THROAT:  No dyskinetic movements or obvious goiter  CHEST:  No shortness of breath  CARDIOVASCULAR:  No chest pain  ABDOMEN:  No nausea, vomiting, pain, constipation or diarrhea  URINARY:  No dysuria, has low libido   MUSCULOSKELETAL:  No tremor, no tic  NEUROLOGIC:  No abnormal movements    Past Medical, Family and Social History: The patient's past medical, family and social history have been reviewed and updated as appropriate within the electronic medical record - see encounter notes.    Compliance: yes    Side effects: decreased libido    Risk Parameters:  Patient reports no suicidal ideation  Patient reports no homicidal ideation  Patient reports no self-injurious behavior  Patient reports no violent behavior    Exam (detailed: at least 9 elements; comprehensive: all 15 elements)   Constitutional  Vitals:  Most recent vital signs, dated less than 90 days prior to this appointment, were reviewed.   Vitals:     "08/05/19 0728   BP: 122/74   Pulse: 92   Weight: 84.4 kg (186 lb 1.1 oz)   Height: 5' 7" (1.702 m)        General:  unremarkable, age appropriate     Musculoskeletal  Muscle Strength/Tone:  no tremor, no tic   Gait & Station:  non-ataxic     Psychiatric  Speech:  no latency; no press   Mood & Affect:  "Good, the best I've ever been."  appropriate   Thought Process:  normal and logical   Associations:  intact   Thought Content:  normal, no suicidality, no homicidality, delusions, or paranoia   Insight:  has awareness of illness   Judgement: limited due to poor coping aeb THCA use and drug seeking behavior   Orientation:  grossly intact   Memory: intact for content of interview   Language: grossly intact   Attention Span & Concentration:  able to focus   Fund of Knowledge:  intact and appropriate to age and level of education     Assessment and Diagnosis   Status/Progress: Based on the examination today, the patient's problem(s) is/are improved.  New problems have not been presented today.   Drug seeking behavior and THCA use are complicating management of the primary condition.  There are no active rule-out diagnoses for this patient at this time.     General Impression: Donell Park has improved and is drug seeking.       ICD-10-CM ICD-9-CM   1. Severe recurrent major depressive disorder with psychotic features with anxious distress F33.3 296.34   2. Mood insomnia F51.05 [id number]    F39    3. Marijuana dependence F12.20 304.30   4. Drug-seeking behavior Z76.5 305.90       Intervention/Counseling/Treatment Plan   · Medication Management: The risks and benefits of medication were discussed with the patient.  · The treatment plan and follow up plan were reviewed with the patient.   1. Safety: Call 911 or Crisis Line or go to ER for suicidal ideation, adverse effects of medication or any other emergency  2. Start psychotherapy to help develop skills to help with concentration and focus.  3. D/C Prozac.  4. " Start Zoloft 25 mg po qd.  5. Continue Remeron 30 mg po qhs.  6. Continue Quetiapine 200 mg po qhs  7. Continue Abilify 10 mg po qd.  8. Start Vistaril 25 mg po qd prn anxiety.  9. Return to clinic in 1 month or sooner.  10. Add THCA dependence and drug seeking behavior to diagnosis list.    Patient agrees with POC.    INSTRUCTIONS  Instructed to call 911 or Crisis Line or go to ER for suicidal ideation, adverse effects of medication or any other emergency. Verbalizes understanding and plan to comply.    Instructed that Red bull causes anxiety and THCA might actually contribute to anxiety and if something else is in the marijuana bought off of the streets, that might cause anxiety. Verbalizes understanding.    Instructed on uses, effects, side effects, adverse reactions and benefits vs risks of zoloft with emphasis on risks for nausea, suicidality, yesenia, serotonin syndrome and delay in feeling effects of medication and instructed on when to seek 911/ER. Verbalizes understanding and plans to comply.      Return to Clinic: 1 month, as needed

## 2019-08-05 ENCOUNTER — OFFICE VISIT (OUTPATIENT)
Dept: PSYCHIATRY | Facility: CLINIC | Age: 44
End: 2019-08-05
Payer: OTHER GOVERNMENT

## 2019-08-05 VITALS
BODY MASS INDEX: 29.2 KG/M2 | HEART RATE: 92 BPM | SYSTOLIC BLOOD PRESSURE: 122 MMHG | WEIGHT: 186.06 LBS | DIASTOLIC BLOOD PRESSURE: 74 MMHG | HEIGHT: 67 IN

## 2019-08-05 DIAGNOSIS — F39 MOOD INSOMNIA: ICD-10-CM

## 2019-08-05 DIAGNOSIS — F33.3 SEVERE RECURRENT MAJOR DEPRESSIVE DISORDER WITH PSYCHOTIC FEATURES WITH ANXIOUS DISTRESS: Primary | ICD-10-CM

## 2019-08-05 DIAGNOSIS — F12.20 MARIJUANA DEPENDENCE: ICD-10-CM

## 2019-08-05 DIAGNOSIS — Z76.5 DRUG-SEEKING BEHAVIOR: ICD-10-CM

## 2019-08-05 DIAGNOSIS — F51.05 MOOD INSOMNIA: ICD-10-CM

## 2019-08-05 PROCEDURE — 99999 PR PBB SHADOW E&M-EST. PATIENT-LVL III: CPT | Mod: PBBFAC,,, | Performed by: NURSE PRACTITIONER

## 2019-08-05 PROCEDURE — 99214 OFFICE O/P EST MOD 30 MIN: CPT | Mod: S$PBB,,, | Performed by: NURSE PRACTITIONER

## 2019-08-05 PROCEDURE — 99213 OFFICE O/P EST LOW 20 MIN: CPT | Mod: PBBFAC | Performed by: NURSE PRACTITIONER

## 2019-08-05 PROCEDURE — 99999 PR PBB SHADOW E&M-EST. PATIENT-LVL III: ICD-10-PCS | Mod: PBBFAC,,, | Performed by: NURSE PRACTITIONER

## 2019-08-05 PROCEDURE — 99214 PR OFFICE/OUTPT VISIT, EST, LEVL IV, 30-39 MIN: ICD-10-PCS | Mod: S$PBB,,, | Performed by: NURSE PRACTITIONER

## 2019-08-05 RX ORDER — QUETIAPINE FUMARATE 200 MG/1
200 TABLET, FILM COATED ORAL NIGHTLY
Qty: 30 TABLET | Refills: 0 | Status: SHIPPED | OUTPATIENT
Start: 2019-08-05 | End: 2019-09-05

## 2019-08-05 RX ORDER — ARIPIPRAZOLE 10 MG/1
10 TABLET ORAL DAILY
Qty: 30 TABLET | Refills: 0 | Status: SHIPPED | OUTPATIENT
Start: 2019-08-05 | End: 2019-09-05 | Stop reason: SDUPTHER

## 2019-08-05 RX ORDER — SERTRALINE HYDROCHLORIDE 25 MG/1
25 TABLET, FILM COATED ORAL DAILY
Qty: 30 TABLET | Refills: 0 | Status: SHIPPED | OUTPATIENT
Start: 2019-08-05 | End: 2019-09-05 | Stop reason: SDUPTHER

## 2019-08-05 RX ORDER — MIRTAZAPINE 30 MG/1
30 TABLET, FILM COATED ORAL NIGHTLY
Qty: 30 TABLET | Refills: 0 | Status: SHIPPED | OUTPATIENT
Start: 2019-08-05 | End: 2019-09-05 | Stop reason: SDUPTHER

## 2019-08-05 NOTE — PATIENT INSTRUCTIONS
Anesthesia Volume In Cc: 0.5 [facility] - DEPARTMENT OF PSYCHIATRY   NEW PATIENT ORIENTATION INFORMATION  OUTPATIENT SERVICES COUNSELING CONTRACT    We appreciate the opportunity to participate in your medical care and hope the following protocols will make it easier for you to receive quality treatment in our department.    1. PUNCTUALITY: Your appointment is scheduled for a fixed amount of time reserved especially for you.  To get the benefit of your appointment, please arrive early enough to allow time for parking and registration.  If you are late for your appointment, your clinician is not able to offer additional time.  Please make every effort to be on time.      2. PAYMENT FOR SERVICES:   Payments are expected at the time of service.  Please contact [phone] if you need to resolve issues involving your account at [facility] or to set up a payment plan.    3. CANCELLATION / MISSED APPOINTMENTS:   In order to receive quality care, all appointments must be kept.  Appointment may be cancelled, ONLY by talking with an  at phone number [phone], between 8:00 a.m. and 5:00 p.m., Monday through Friday, at least 24 hours before your appointment time.  Your clinician reserves this time specifically for you, and if you will be unable to use it, it is necessary that you cancel in a timely manner.  If you do not give at least 24-hour notice of cancellation a full fee will be assessed.  Please note that insurance does not cover no-show charges, so you will be billed directly.  If you are consistently late, cancel, or do not show for your appointments, our department reserves the right to terminate treatment    4. CALLING THE DEPARTMENT:   MESSAGES, SCHEDULE OR CANCEL APPOINTMENTS- In general you can reach the department by calling [phone], between 8:00 a.m. and 5:00 p.m., Monday through Friday, to schedule or cancel appointments or leave a message for your clinician.  It is advisable to schedule your visits  Anesthesia Type: 1% lidocaine with epinephrine far in advance to obtain the most convenient times for your appointments.   AFTER HOURS, WEEKEND OR HOLIDAYS- For urgent questions after hours, weekends and holidays, calling the department number [phone] will connect you to the nursing staff on the Psychiatry Inpatient Unit.  The nursing staff will speak with you and contact the On Call psychiatrist if necessary.   EMERGENCY-  In case of a crisis when there is a concern of harm to self or others, call 911 or the office [phone] between 8:00 a.m. and 5:00 p.m., Monday through Friday.  After hours, weekends or holidays, please call 911 or go to the Emergency Department where you can be thoroughly evaluated by the On Call psychiatrist.  If possible, contact the psychiatrist on call at [phone] prior to leaving for the [facility].    5. TEAM APPROACH:  Most patients receive therapy through our team system.  In the team system, your primary therapist will be a , psychologist, psychiatry resident or psychiatrist.  If your therapist is a , psychologist or psychiatry resident, please contact your primary therapist first in matters other than medications or acute medical problems.    6. PRESCRIPTION REFILLS:  Prescription refills must be done at your physician office visit.  You will be given a sufficient number of refills to last one extra month beyond your next appointment.  No additional refills will be approved beyond the original treatment plan.  After hours, sufficient medication may be approved to last until the next scheduled appointment. After hours requests for refills on controlled substances may be declined by the psychiatrist on call, as he or she may not be familiar with your case  Please work closely with your doctor so that you have sufficient medication until your next appointment.  Again, please note that no additional prescriptions will be approved per patient request over the phone.   No  "additional refills will be approved beyond the original treatment plan.   In certain exceptional situations, a phone consult appointment may be arranged, with appropriate charge, for the review and approval of prescription refills to last until the next scheduled appointment.    7. FOLLOW UP APPOINTMENTS:  Follow-up appointments can be made in person at the Psychiatry Appointment Desk, [place] [address], or by calling [phone], from 8am to 5pm, between Monday and Friday.  It is advisable to schedule your office visits far enough in advance to obtain the most convenient times for your appointments.    Revised Feb. 23, 2010 - F/OA1/Newpatient        You have been provided with a certain amount of medication with a specified number of refills.  Please follow up within an adequate time before you run out of medications.    REFILLS FOR CONTROLLED SUBSTANCES WILL NOT BE GIVEN WITHOUT AN APPOINTMENT.  I will not honor or fill automated refill requests from pharmacies.  You must come in for an appointment to get refills.    Please book your next appointment for myself or therapist by phone by calling our office at [phone].      PLEASE BE AT LEAST 15 MINUTES EARLY FOR YOUR NEXT APPOINTMENT.  PLEASE, DO NOT BE LATE OR YOU WILL BE TURNED AWAY AND ASKED TO RESCHEDULE.  YOU MUST COME EARLY TO ALLOW TIME FOR CHECK-IN AS WELL AS GET YOUR VITAL SIGNS AND GO OVER YOUR MEDICATIONS.  Tardiness is not fair to the patients who present after you and are on time for their appointments.  It causes a delay in the appointments for patients and staff.  IF YOU ARE LATE, THERE IS A POSSIBILITY THAT YOU WILL BE CHARGED FOR THE APPOINTMENT TIME PERSONALLY AND IT WILL NOT GO TO YOUR INSURANCE.  YOU MAY ALSO BE DISCHARGED FROM CLINIC with multiple "No Show" appointments.  -----------------------------------------------------------------------------------------------------------------  IF YOU FEEL SUICIDAL OR HAVING THOUGHTS OR PLANS TO HURT " Curettage Text: The wound bed was treated with curettage after the biopsy was performed. Additional Anesthesia Volume In Cc (Will Not Render If 0): 0 YOURSELF OR OTHERS, CALL 911 OR REPORT TO THE NEAREST EMERGENCY ROOM.  YOU CAN ALSO ACCESS THE FOLLOWING HOTLINE(S):    National Suicide Hotline Number 0-043-558-TALK (1544)     ([facility] Mobile Crisis, 929.775.1763'   [facility], (526) 798-6426; [facility], 24 hours / 7 days, (592) 390-XYXV (4212), 5-564-076-ROTY (1638))     TIPS FOR GETTING YOUR PRESCRIPTIONS:    You can always ask your pharmacist the cost of your medications without the use of your insurance. Sometimes the medication will be cheaper if you do not use your insurance.     If you decide you want to have your prescriptions filled at a different pharmacy, you can always go to the new pharmacy of your choice and have them call the pharmacy where your prescription was sent and they can have your prescription transferred to the new pharmacy.      Destruction After The Procedure: No Consent: Written consent was obtained and risks were reviewed including but not limited to scarring, infection, bleeding, scabbing, incomplete removal, nerve damage and allergy to anesthesia. Was A Bandage Applied: Yes Biopsy Method: Dermablade Biopsy Type: H and E Post-Care Instructions: I reviewed with the patient in detail post-care instructions. Patient is to keep the biopsy site dry overnight, and then apply bacitracin twice daily until healed. Patient may apply hydrogen peroxide soaks to remove any crusting. Body Location Override (Optional - Billing Will Still Be Based On Selected Body Map Location If Applicable): left posterior neck Electrodesiccation Text: The wound bed was treated with electrodesiccation after the biopsy was performed. Type Of Destruction Used: Curettage Cryotherapy Text: The wound bed was treated with cryotherapy after the biopsy was performed. Detail Level: Detailed Wound Care: Petrolatum Dressing: bandage Electrodesiccation And Curettage Text: The wound bed was treated with electrodesiccation and curettage after the biopsy was performed. Billing Type: Third-Party Bill Hemostasis: Drysol Silver Nitrate Text: The wound bed was treated with silver nitrate after the biopsy was performed. Depth Of Biopsy: dermis Notification Instructions: Patient will be notified of biopsy results. However, patient instructed to call the office if not contacted within 2 weeks.

## 2019-08-05 NOTE — LETTER
August 5, 2019    [address] [facility] [address], [address]  [place] [address]  Phone: [phone]  Fax: [phone]         Donell Park  [address]  [place] [address]        Donell Park    Was treated here on 08/05/2019    May Return to work/school on 08/05/2019    No Restrictions        Sincerely,    Tiffany Braga NP

## 2019-09-04 NOTE — PROGRESS NOTES
Outpatient Psychiatry Follow-Up Visit (MD/NP)    9/5/2019    Clinical Status of Patient:  Outpatient (Ambulatory)    Chief Complaint:  Donell Park is a 43 y.o. male who presents today for follow-up of depression, anxiety, psychosis and poor sleep, marijuana dependence and drug seeking behavior.  Met with patient.      Interval History and Content of Current Session:  Interim Events/Subjective Report/Content of Current Session: Donell  was last seen by me on 08/05/2019 for an initial psychiatric evaluation. Donell was diagnosed with Severe recurrent major depressive disorder with psychotic features with anxious distress , mood insomnia, marijuana dependence and drug seeking behavior and a plan of care was devised to include:    1. Safety: Call 911 or Crisis Line or go to ER for suicidal ideation, adverse effects of medication or any other emergency  2. Start psychotherapy to help develop skills to help with concentration and focus.  3. D/C Prozac.  4. Start Zoloft 25 mg po qd.  5. Continue Remeron 30 mg po qhs.  6. Continue Quetiapine 200 mg po qhs  7. Continue Abilify 10 mg po qd.  8. Start Vistaril 25 mg po qd prn anxiety.  9. Return to clinic in 1 month or sooner.      Today Donell is seen face to face. His depression is under control with the medication. His psychotic features are gone. His anxiety is under control. He denies current marijuana use. He states he last used it 3 weeks ago. His sleep is good. His appetite is good and his energy level is good. He has requested a letter for a new job discussing the side effects of Seroquel and is advised that this will be done as per his request. He has left a fax number for it to be faxed. He is agreeable to tapering off of the Seroquel and this will be written in the requested letter.     Psychotherapy:  · Target symptoms: depression, anxiety , psychosis, poor sleep, marijuana dependence and drug seeking behavior  · Why chosen therapy is appropriate  "versus another modality: relevant to diagnosis, evidence based practice  · Outcome monitoring methods: self-report, observation  · Therapeutic intervention type: supportive psychotherapy  · Topics discussed/themes: symptom improvement, going back to work  · The patient's response to the intervention is accepting. The patient's progress toward treatment goals is good.   · Duration of intervention: 15 minutes.    Review of Systems   PSYCHIATRIC: Pertinant items are noted in the narrative.  GENERAL:  Well developed   SKIN:  No rashes or lacerations, tatoos to UEs bilaterally and to right lower extremity  HEAD:  No headache  EYES:  No exophthalmos, jaundice or blindness  EARS:  No hearing loss  MOUTH & THROAT:  No dyskinetic movements or obvious goiter  CHEST:  No shortness of breath  CARDIOVASCULAR:  No chest pain  ABDOMEN:  No nausea, vomiting, pain, constipation or diarrhea  URINARY:  No dysuria, has low libido at times, there is mild improvement  MUSCULOSKELETAL:  No tremor, no tic  NEUROLOGIC:  No abnormal movements    Past Medical, Family and Social History: The patient's past medical, family and social history have been reviewed and updated as appropriate within the electronic medical record - see encounter notes.    Compliance: yes    Side effects: None    Risk Parameters:  Patient reports no suicidal ideation  Patient reports no homicidal ideation  Patient reports no self-injurious behavior  Patient reports no violent behavior    Exam (detailed: at least 9 elements; comprehensive: all 15 elements)   Constitutional  Vitals:  Most recent vital signs, dated less than 90 days prior to this appointment, were reviewed.   Vitals:    09/05/19 0734   BP: 122/78   Pulse: 96   Weight: 84.9 kg (187 lb 4.5 oz)   Height: 5' 7" (1.702 m)        General:  unremarkable, age appropriate     Musculoskeletal  Muscle Strength/Tone:  no tremor, no tic   Gait & Station:  non-ataxic     Psychiatric  Speech:  no latency; no press " "  Mood & Affect:  "aggravated." (about needing letter for work)  congruent and appropriate   Thought Process:  normal and logical   Associations:  intact   Thought Content:  normal, no suicidality, no homicidality, delusions, or paranoia   Insight:  has awareness of illness   Judgement: behavior is adequate to circumstances   Orientation:  grossly intact   Memory: intact for content of interview   Language: grossly intact   Attention Span & Concentration:  able to focus   Fund of Knowledge:  intact and appropriate to age and level of education     Assessment and Diagnosis   Status/Progress: Based on the examination today, the patient's problem(s) is/are improved.  New problems have not been presented today.   Lack of compliance are not complicating management of the primary condition.  There are no active rule-out diagnoses for this patient at this time.     General Impression: He has improved.      ICD-10-CM ICD-9-CM   1. Severe recurrent major depressive disorder with psychotic features with anxious distress F33.3 296.34   2. Mood insomnia F51.05 [id number]    F39        Intervention/Counseling/Treatment Plan   · Medication Management: The risks and benefits of medication were discussed with the patient.  · The treatment plan and follow up plan were reviewed with the patient.   1. Safety: Call 911 or Crisis Line or go to ER for suicidal ideation, adverse effects of medication or any other emergency  2. Start psychotherapy to help develop skills to help with concentration and focus. Missed scheduled evaluation appt. And will reschedule  3. Return to clinic in one month or sooner prn.   4. Zoloft 25 mg po qd.  5. Remeron 30 mg po qhs.  6. Taper off of Quetiapine 200 mg: Take 1/2 a tablet a night for 7 nights then take 1/2 tab every other night for 7 doses then stop  7. Abilify 10 mg po qd.  8. Vistaril 25 mg po qd prn anxiety.      Patient agrees with POC.     INSTRUCTIONS  Instructed to call 911 or Crisis Line or go " to ER for suicidal ideation, adverse effects of medication or any other emergency. Verbalizes understanding and plan to comply.      Return to Clinic: 1 month, as needed

## 2019-09-05 ENCOUNTER — OFFICE VISIT (OUTPATIENT)
Dept: PSYCHIATRY | Facility: CLINIC | Age: 44
End: 2019-09-05
Payer: OTHER GOVERNMENT

## 2019-09-05 VITALS
HEIGHT: 67 IN | SYSTOLIC BLOOD PRESSURE: 122 MMHG | BODY MASS INDEX: 29.39 KG/M2 | WEIGHT: 187.25 LBS | DIASTOLIC BLOOD PRESSURE: 78 MMHG | HEART RATE: 96 BPM

## 2019-09-05 DIAGNOSIS — F33.3 SEVERE RECURRENT MAJOR DEPRESSIVE DISORDER WITH PSYCHOTIC FEATURES WITH ANXIOUS DISTRESS: Primary | ICD-10-CM

## 2019-09-05 DIAGNOSIS — F39 MOOD INSOMNIA: ICD-10-CM

## 2019-09-05 DIAGNOSIS — F51.05 MOOD INSOMNIA: ICD-10-CM

## 2019-09-05 PROCEDURE — 99999 PR PBB SHADOW E&M-EST. PATIENT-LVL III: CPT | Mod: PBBFAC,,, | Performed by: NURSE PRACTITIONER

## 2019-09-05 PROCEDURE — 99214 PR OFFICE/OUTPT VISIT, EST, LEVL IV, 30-39 MIN: ICD-10-PCS | Mod: S$PBB,,, | Performed by: NURSE PRACTITIONER

## 2019-09-05 PROCEDURE — 99999 PR PBB SHADOW E&M-EST. PATIENT-LVL III: ICD-10-PCS | Mod: PBBFAC,,, | Performed by: NURSE PRACTITIONER

## 2019-09-05 PROCEDURE — 99213 OFFICE O/P EST LOW 20 MIN: CPT | Mod: PBBFAC | Performed by: NURSE PRACTITIONER

## 2019-09-05 PROCEDURE — 99214 OFFICE O/P EST MOD 30 MIN: CPT | Mod: S$PBB,,, | Performed by: NURSE PRACTITIONER

## 2019-09-05 RX ORDER — QUETIAPINE FUMARATE 200 MG/1
TABLET, FILM COATED ORAL
Qty: 30 TABLET | Refills: 0 | Status: ON HOLD
Start: 2019-09-05 | End: 2019-09-18 | Stop reason: HOSPADM

## 2019-09-05 RX ORDER — SERTRALINE HYDROCHLORIDE 25 MG/1
25 TABLET, FILM COATED ORAL DAILY
Qty: 30 TABLET | Refills: 0 | Status: ON HOLD | OUTPATIENT
Start: 2019-09-05 | End: 2019-09-18

## 2019-09-05 RX ORDER — MIRTAZAPINE 30 MG/1
30 TABLET, FILM COATED ORAL NIGHTLY
Qty: 30 TABLET | Refills: 0 | Status: ON HOLD | OUTPATIENT
Start: 2019-09-05 | End: 2019-09-24 | Stop reason: HOSPADM

## 2019-09-05 RX ORDER — ARIPIPRAZOLE 10 MG/1
10 TABLET ORAL DAILY
Qty: 30 TABLET | Refills: 0 | Status: ON HOLD | OUTPATIENT
Start: 2019-09-05 | End: 2019-09-18 | Stop reason: HOSPADM

## 2019-09-05 NOTE — PATIENT INSTRUCTIONS
"You have been provided with a certain amount of medication with a specified number of refills.  Please follow up within an adequate time before you run out of medications.    REFILLS FOR CONTROLLED SUBSTANCES WILL NOT BE GIVEN WITHOUT AN APPOINTMENT.  I will not honor or fill automated refill requests from pharmacies.  You must come in for an appointment to get refills.    Please book your next appointment for myself or therapist by phone by calling our office at [phone].      PLEASE BE AT LEAST 15 MINUTES EARLY FOR YOUR NEXT APPOINTMENT.  PLEASE, DO NOT BE LATE OR YOU WILL BE TURNED AWAY AND ASKED TO RESCHEDULE.  YOU MUST COME EARLY TO ALLOW TIME FOR CHECK-IN AS WELL AS GET YOUR VITAL SIGNS AND GO OVER YOUR MEDICATIONS.  Tardiness is not fair to the patients who present after you and are on time for their appointments.  It causes a delay in the appointments for patients and staff.  IF YOU ARE LATE, THERE IS A POSSIBILITY THAT YOU WILL BE CHARGED FOR THE APPOINTMENT TIME PERSONALLY AND IT WILL NOT GO TO YOUR INSURANCE.  YOU MAY ALSO BE DISCHARGED FROM CLINIC with multiple "No Show" appointments.  -----------------------------------------------------------------------------------------------------------------  IF YOU FEEL SUICIDAL OR HAVING THOUGHTS OR PLANS TO HURT YOURSELF OR OTHERS, CALL 911 OR REPORT TO THE NEAREST EMERGENCY ROOM.  YOU CAN ALSO ACCESS THE FOLLOWING HOTLINE(S):    National Suicide Hotline Number 8-689-990-TALK (0460)     ([facility] Mobile Crisis, 669.943.7540'   [facility] Crisis Line, (378) 457-4286; [place], 24 hours / 7 days, (135) 610-COPE (3207), 3-821-774-COPE (7661))     TIPS FOR GETTING YOUR PRESCRIPTIONS:    You can always ask your pharmacist the cost of your medications without the use of your insurance. Sometimes the medication will be cheaper if you do not use your insurance.     If you decide you want to have your prescriptions filled at " a different pharmacy, you can always go to the new pharmacy of your choice and have them call the pharmacy where your prescription was sent and they can have your prescription transferred to the new pharmacy.     INSTRUCTIONS:  Taper off of Seroquel (Quetiapine) 200 mg tablets: Take 1/2 a tablet a night for 7 nights then take 1/2 tab every other night for 7 doses then stop

## 2019-09-10 ENCOUNTER — TELEPHONE (OUTPATIENT)
Dept: INTERNAL MEDICINE | Facility: CLINIC | Age: 44
End: 2019-09-10

## 2019-09-10 NOTE — LETTER
September 10, 2019      [facility] [place] [address]  [address]  [facility] [address]  Phone: [phone]  Fax: [phone]       Patient: Donell Park   YOB: 1975  Date of Visit: 09/10/2019    To Whom It May Concern:    Elizabeth Park  Is currently a patient under my care. He may return to work/school on 09/11/2019. with no restrictions. If you have any questions or concerns, or if I can be of further assistance, please do not hesitate to contact me.    Sincerely,    Yenny Resendiz MD

## 2019-09-10 NOTE — TELEPHONE ENCOUNTER
----- Message from Yenny Resendiz MD sent at 9/10/2019 10:50 AM CDT -----  Contact: Self   Please write a letter stating that he can return to work with no restrictions.  Thanks!  ----- Message -----  From: January Hurtado MA  Sent: 9/10/2019   9:31 AM  To: Yenny Resendiz MD    patient would like a letter stating that he is okay to work   ----- Message -----  From: Ambar Cruz  Sent: 9/10/2019   9:23 AM  To: Martín Ramon Staff    Name of Who is Calling: ADAIR CHANG [[id number]]      What is the request in detail: Pt states that through a physical and was informed that he need a medical release. States that he had a accident back in august 2015 and was being seen at  for a shoulder injury and never followed up. However he stats that he is fine. Pt would just like a note saying its okay for him to work.   Please contact to further discuss and advise.       Can the clinic reply by [facility]: N      What Number to Call Back if not in [facility]: [phone]

## 2019-09-16 ENCOUNTER — HOSPITAL ENCOUNTER (INPATIENT)
Facility: HOSPITAL | Age: 44
LOS: 2 days | Discharge: PSYCHIATRIC OR SUBSTANCE USE FACILITY | DRG: 918 | End: 2019-09-18
Attending: EMERGENCY MEDICINE | Admitting: HOSPITALIST
Payer: OTHER GOVERNMENT

## 2019-09-16 DIAGNOSIS — T39.011A ASPIRIN OVERDOSE: ICD-10-CM

## 2019-09-16 DIAGNOSIS — T65.92XA SUICIDE ATTEMPT BY SUBSTANCE OVERDOSE, INITIAL ENCOUNTER: ICD-10-CM

## 2019-09-16 DIAGNOSIS — T50.902A SUICIDE ATTEMPT BY DRUG INGESTION, INITIAL ENCOUNTER: Primary | ICD-10-CM

## 2019-09-16 PROBLEM — H93.13 TINNITUS OF BOTH EARS: Status: ACTIVE | Noted: 2019-09-16

## 2019-09-16 LAB
ALBUMIN SERPL BCP-MCNC: 3.8 G/DL (ref 3.5–5.2)
ALLENS TEST: ABNORMAL
ALP SERPL-CCNC: 71 U/L (ref 55–135)
ALT SERPL W/O P-5'-P-CCNC: 18 U/L (ref 10–44)
AMPHET+METHAMPHET UR QL: NEGATIVE
ANION GAP SERPL CALC-SCNC: 11 MMOL/L (ref 8–16)
ANION GAP SERPL CALC-SCNC: 12 MMOL/L (ref 8–16)
ANION GAP SERPL CALC-SCNC: 12 MMOL/L (ref 8–16)
ANION GAP SERPL CALC-SCNC: 13 MMOL/L (ref 8–16)
ANION GAP SERPL CALC-SCNC: 8 MMOL/L (ref 8–16)
ANION GAP SERPL CALC-SCNC: 9 MMOL/L (ref 8–16)
ANION GAP SERPL CALC-SCNC: 9 MMOL/L (ref 8–16)
APAP SERPL-MCNC: <3 UG/ML (ref 10–20)
APTT BLDCRRT: 28.4 SEC (ref 21–32)
AST SERPL-CCNC: 18 U/L (ref 10–40)
BACTERIA #/AREA URNS HPF: NORMAL /HPF
BACTERIA #/AREA URNS HPF: NORMAL /HPF
BARBITURATES UR QL SCN>200 NG/ML: NEGATIVE
BASOPHILS # BLD AUTO: 0.03 K/UL (ref 0–0.2)
BASOPHILS NFR BLD: 0.4 % (ref 0–1.9)
BENZODIAZ UR QL SCN>200 NG/ML: NEGATIVE
BILIRUB SERPL-MCNC: 0.4 MG/DL (ref 0.1–1)
BILIRUB UR QL STRIP: NEGATIVE
BUN SERPL-MCNC: 13 MG/DL (ref 6–20)
BUN SERPL-MCNC: 13 MG/DL (ref 6–20)
BUN SERPL-MCNC: 15 MG/DL (ref 6–20)
BUN SERPL-MCNC: 15 MG/DL (ref 6–20)
BUN SERPL-MCNC: 16 MG/DL (ref 6–20)
BUN SERPL-MCNC: 17 MG/DL (ref 6–20)
BUN SERPL-MCNC: 17 MG/DL (ref 6–30)
BZE UR QL SCN: NEGATIVE
CALCIUM SERPL-MCNC: 8.1 MG/DL (ref 8.7–10.5)
CALCIUM SERPL-MCNC: 8.2 MG/DL (ref 8.7–10.5)
CALCIUM SERPL-MCNC: 8.3 MG/DL (ref 8.7–10.5)
CALCIUM SERPL-MCNC: 8.4 MG/DL (ref 8.7–10.5)
CALCIUM SERPL-MCNC: 9 MG/DL (ref 8.7–10.5)
CALCIUM SERPL-MCNC: 9 MG/DL (ref 8.7–10.5)
CANNABINOIDS UR QL SCN: NEGATIVE
CHLORIDE SERPL-SCNC: 105 MMOL/L (ref 95–110)
CHLORIDE SERPL-SCNC: 105 MMOL/L (ref 95–110)
CHLORIDE SERPL-SCNC: 107 MMOL/L (ref 95–110)
CHLORIDE SERPL-SCNC: 107 MMOL/L (ref 95–110)
CHLORIDE SERPL-SCNC: 108 MMOL/L (ref 95–110)
CHLORIDE SERPL-SCNC: 108 MMOL/L (ref 95–110)
CHLORIDE SERPL-SCNC: 109 MMOL/L (ref 95–110)
CLARITY UR: CLEAR
CO2 SERPL-SCNC: 22 MMOL/L (ref 23–29)
CO2 SERPL-SCNC: 22 MMOL/L (ref 23–29)
CO2 SERPL-SCNC: 23 MMOL/L (ref 23–29)
CO2 SERPL-SCNC: 26 MMOL/L (ref 23–29)
COLOR UR: ABNORMAL
COLOR UR: ABNORMAL
COLOR UR: NORMAL
COLOR UR: YELLOW
CREAT SERPL-MCNC: 1 MG/DL (ref 0.5–1.4)
CREAT SERPL-MCNC: 1 MG/DL (ref 0.5–1.4)
CREAT SERPL-MCNC: 1.1 MG/DL (ref 0.5–1.4)
CREAT SERPL-MCNC: 1.1 MG/DL (ref 0.5–1.4)
CREAT SERPL-MCNC: 1.2 MG/DL (ref 0.5–1.4)
CREAT UR-MCNC: 62.9 MG/DL (ref 23–375)
DELSYS: ABNORMAL
DIFFERENTIAL METHOD: ABNORMAL
EOSINOPHIL # BLD AUTO: 0.2 K/UL (ref 0–0.5)
EOSINOPHIL NFR BLD: 2.2 % (ref 0–8)
ERYTHROCYTE [DISTWIDTH] IN BLOOD BY AUTOMATED COUNT: 13.9 % (ref 11.5–14.5)
EST. GFR  (AFRICAN AMERICAN): >60 ML/MIN/1.73 M^2
EST. GFR  (NON AFRICAN AMERICAN): >60 ML/MIN/1.73 M^2
ETHANOL SERPL-MCNC: <10 MG/DL
GLUCOSE SERPL-MCNC: 100 MG/DL (ref 70–110)
GLUCOSE SERPL-MCNC: 106 MG/DL (ref 70–110)
GLUCOSE SERPL-MCNC: 110 MG/DL (ref 70–110)
GLUCOSE SERPL-MCNC: 111 MG/DL (ref 70–110)
GLUCOSE SERPL-MCNC: 111 MG/DL (ref 70–110)
GLUCOSE SERPL-MCNC: 112 MG/DL (ref 70–110)
GLUCOSE SERPL-MCNC: 75 MG/DL (ref 70–110)
GLUCOSE UR QL STRIP: ABNORMAL
GLUCOSE UR QL STRIP: NEGATIVE
HCO3 UR-SCNC: 24.7 MMOL/L (ref 24–28)
HCT VFR BLD AUTO: 46.8 % (ref 40–54)
HCT VFR BLD CALC: 46 %PCV (ref 36–54)
HGB BLD-MCNC: 16.4 G/DL (ref 14–18)
HGB UR QL STRIP: NEGATIVE
HYALINE CASTS #/AREA URNS LPF: 0 /LPF
HYALINE CASTS #/AREA URNS LPF: 0 /LPF
INR PPP: 0.9 (ref 0.8–1.2)
KETONES UR QL STRIP: NEGATIVE
LEUKOCYTE ESTERASE UR QL STRIP: NEGATIVE
LYMPHOCYTES # BLD AUTO: 1.3 K/UL (ref 1–4.8)
LYMPHOCYTES NFR BLD: 17.7 % (ref 18–48)
MAGNESIUM SERPL-MCNC: 2 MG/DL (ref 1.6–2.6)
MCH RBC QN AUTO: 32.2 PG (ref 27–31)
MCHC RBC AUTO-ENTMCNC: 35 G/DL (ref 32–36)
MCV RBC AUTO: 92 FL (ref 82–98)
METHADONE UR QL SCN>300 NG/ML: NEGATIVE
MICROSCOPIC COMMENT: NORMAL
MICROSCOPIC COMMENT: NORMAL
MODE: ABNORMAL
MONOCYTES # BLD AUTO: 0.8 K/UL (ref 0.3–1)
MONOCYTES NFR BLD: 10.3 % (ref 4–15)
NEUTROPHILS # BLD AUTO: 5.1 K/UL (ref 1.8–7.7)
NEUTROPHILS NFR BLD: 69.8 % (ref 38–73)
NITRITE UR QL STRIP: NEGATIVE
OPIATES UR QL SCN: NEGATIVE
PCO2 BLDA: 37.9 MMHG (ref 35–45)
PCP UR QL SCN>25 NG/ML: NEGATIVE
PH SMN: 7.42 [PH] (ref 7.35–7.45)
PH UR STRIP: 6 [PH] (ref 5–8)
PH UR STRIP: 7 [PH] (ref 5–8)
PLATELET # BLD AUTO: 266 K/UL (ref 150–350)
PMV BLD AUTO: 10.6 FL (ref 9.2–12.9)
PO2 BLDA: 57 MMHG (ref 40–60)
POC BE: 0 MMOL/L
POC IONIZED CALCIUM: 1.17 MMOL/L (ref 1.06–1.42)
POC SATURATED O2: 90 % (ref 95–100)
POC TCO2 (MEASURED): 26 MMOL/L (ref 23–29)
POC TCO2: 26 MMOL/L (ref 24–29)
POTASSIUM BLD-SCNC: 3.7 MMOL/L (ref 3.5–5.1)
POTASSIUM SERPL-SCNC: 3.6 MMOL/L (ref 3.5–5.1)
POTASSIUM SERPL-SCNC: 3.7 MMOL/L (ref 3.5–5.1)
POTASSIUM SERPL-SCNC: 3.9 MMOL/L (ref 3.5–5.1)
POTASSIUM SERPL-SCNC: 4.3 MMOL/L (ref 3.5–5.1)
PROT SERPL-MCNC: 7 G/DL (ref 6–8.4)
PROT UR QL STRIP: ABNORMAL
PROT UR QL STRIP: ABNORMAL
PROT UR QL STRIP: NEGATIVE
PROT UR QL STRIP: NEGATIVE
PROTHROMBIN TIME: 9.8 SEC (ref 9–12.5)
RBC # BLD AUTO: 5.1 M/UL (ref 4.6–6.2)
RBC #/AREA URNS HPF: 0 /HPF (ref 0–4)
RBC #/AREA URNS HPF: 0 /HPF (ref 0–4)
SALICYLATES SERPL-MCNC: 31.2 MG/DL (ref 15–30)
SALICYLATES SERPL-MCNC: 36.2 MG/DL (ref 15–30)
SALICYLATES SERPL-MCNC: 43.9 MG/DL (ref 15–30)
SALICYLATES SERPL-MCNC: 45 MG/DL (ref 15–30)
SALICYLATES SERPL-MCNC: 47.1 MG/DL (ref 15–30)
SALICYLATES SERPL-MCNC: <5 MG/DL (ref 15–30)
SAMPLE: ABNORMAL
SAMPLE: ABNORMAL
SITE: ABNORMAL
SODIUM BLD-SCNC: 139 MMOL/L (ref 136–145)
SODIUM SERPL-SCNC: 139 MMOL/L (ref 136–145)
SODIUM SERPL-SCNC: 140 MMOL/L (ref 136–145)
SODIUM SERPL-SCNC: 141 MMOL/L (ref 136–145)
SODIUM SERPL-SCNC: 142 MMOL/L (ref 136–145)
SP GR UR STRIP: 1.01 (ref 1–1.03)
SP GR UR STRIP: 1.02 (ref 1–1.03)
SP02: 95
SQUAMOUS #/AREA URNS HPF: 1 /HPF
TOXICOLOGY INFORMATION: NORMAL
TSH SERPL DL<=0.005 MIU/L-ACNC: 2.84 UIU/ML (ref 0.4–4)
URN SPEC COLLECT METH UR: ABNORMAL
URN SPEC COLLECT METH UR: NORMAL
UROBILINOGEN UR STRIP-ACNC: NEGATIVE EU/DL
WBC # BLD AUTO: 7.28 K/UL (ref 3.9–12.7)
WBC #/AREA URNS HPF: 0 /HPF (ref 0–5)
WBC #/AREA URNS HPF: 2 /HPF (ref 0–5)

## 2019-09-16 PROCEDURE — 85025 COMPLETE CBC W/AUTO DIFF WBC: CPT

## 2019-09-16 PROCEDURE — 25000003 PHARM REV CODE 250: Performed by: EMERGENCY MEDICINE

## 2019-09-16 PROCEDURE — 80329 ANALGESICS NON-OPIOID 1 OR 2: CPT

## 2019-09-16 PROCEDURE — 84443 ASSAY THYROID STIM HORMONE: CPT

## 2019-09-16 PROCEDURE — 93005 ELECTROCARDIOGRAM TRACING: CPT

## 2019-09-16 PROCEDURE — 85610 PROTHROMBIN TIME: CPT

## 2019-09-16 PROCEDURE — 99291 CRITICAL CARE FIRST HOUR: CPT | Mod: 25

## 2019-09-16 PROCEDURE — 85730 THROMBOPLASTIN TIME PARTIAL: CPT

## 2019-09-16 PROCEDURE — 80053 COMPREHEN METABOLIC PANEL: CPT

## 2019-09-16 PROCEDURE — 85014 HEMATOCRIT: CPT

## 2019-09-16 PROCEDURE — 93010 ELECTROCARDIOGRAM REPORT: CPT | Mod: ,,, | Performed by: INTERNAL MEDICINE

## 2019-09-16 PROCEDURE — 83735 ASSAY OF MAGNESIUM: CPT

## 2019-09-16 PROCEDURE — 82803 BLOOD GASES ANY COMBINATION: CPT

## 2019-09-16 PROCEDURE — 82330 ASSAY OF CALCIUM: CPT

## 2019-09-16 PROCEDURE — 20000000 HC ICU ROOM

## 2019-09-16 PROCEDURE — 80307 DRUG TEST PRSMV CHEM ANLYZR: CPT

## 2019-09-16 PROCEDURE — 90792 PSYCH DIAG EVAL W/MED SRVCS: CPT | Mod: ,,, | Performed by: NURSE PRACTITIONER

## 2019-09-16 PROCEDURE — 90792 PR PSYCHIATRIC DIAGNOSTIC EVALUATION W/MEDICAL SERVICES: ICD-10-PCS | Mod: ,,, | Performed by: NURSE PRACTITIONER

## 2019-09-16 PROCEDURE — 80320 DRUG SCREEN QUANTALCOHOLS: CPT

## 2019-09-16 PROCEDURE — 63600175 PHARM REV CODE 636 W HCPCS: Performed by: CLINIC/CENTER

## 2019-09-16 PROCEDURE — 63600175 PHARM REV CODE 636 W HCPCS: Performed by: HOSPITALIST

## 2019-09-16 PROCEDURE — 63600175 PHARM REV CODE 636 W HCPCS: Performed by: EMERGENCY MEDICINE

## 2019-09-16 PROCEDURE — 81000 URINALYSIS NONAUTO W/SCOPE: CPT | Mod: 59

## 2019-09-16 PROCEDURE — 84295 ASSAY OF SERUM SODIUM: CPT

## 2019-09-16 PROCEDURE — 96361 HYDRATE IV INFUSION ADD-ON: CPT

## 2019-09-16 PROCEDURE — 96360 HYDRATION IV INFUSION INIT: CPT

## 2019-09-16 PROCEDURE — 25000003 PHARM REV CODE 250: Performed by: CLINIC/CENTER

## 2019-09-16 PROCEDURE — 81003 URINALYSIS AUTO W/O SCOPE: CPT | Mod: 91,59

## 2019-09-16 PROCEDURE — 93010 EKG 12-LEAD: ICD-10-PCS | Mod: ,,, | Performed by: INTERNAL MEDICINE

## 2019-09-16 PROCEDURE — 25000003 PHARM REV CODE 250: Performed by: HOSPITALIST

## 2019-09-16 PROCEDURE — 84132 ASSAY OF SERUM POTASSIUM: CPT

## 2019-09-16 PROCEDURE — 99900035 HC TECH TIME PER 15 MIN (STAT)

## 2019-09-16 PROCEDURE — 80048 BASIC METABOLIC PNL TOTAL CA: CPT | Mod: 91

## 2019-09-16 PROCEDURE — 81003 URINALYSIS AUTO W/O SCOPE: CPT

## 2019-09-16 PROCEDURE — 36415 COLL VENOUS BLD VENIPUNCTURE: CPT

## 2019-09-16 PROCEDURE — 82565 ASSAY OF CREATININE: CPT

## 2019-09-16 PROCEDURE — 80048 BASIC METABOLIC PNL TOTAL CA: CPT

## 2019-09-16 RX ORDER — ENOXAPARIN SODIUM 100 MG/ML
40 INJECTION SUBCUTANEOUS EVERY 24 HOURS
Status: DISCONTINUED | OUTPATIENT
Start: 2019-09-16 | End: 2019-09-18 | Stop reason: HOSPADM

## 2019-09-16 RX ORDER — POTASSIUM CHLORIDE 20 MEQ/15ML
40 SOLUTION ORAL ONCE
Status: COMPLETED | OUTPATIENT
Start: 2019-09-16 | End: 2019-09-16

## 2019-09-16 RX ORDER — POTASSIUM CHLORIDE 750 MG/1
10 TABLET, EXTENDED RELEASE ORAL ONCE
Status: COMPLETED | OUTPATIENT
Start: 2019-09-16 | End: 2019-09-16

## 2019-09-16 RX ORDER — IBUPROFEN 200 MG
1 TABLET ORAL DAILY
Status: DISCONTINUED | OUTPATIENT
Start: 2019-09-17 | End: 2019-09-18 | Stop reason: HOSPADM

## 2019-09-16 RX ORDER — PREDNISONE 20 MG/1
40 TABLET ORAL
Status: DISCONTINUED | OUTPATIENT
Start: 2019-09-16 | End: 2019-09-16

## 2019-09-16 RX ORDER — POTASSIUM CHLORIDE 20 MEQ/1
40 TABLET, EXTENDED RELEASE ORAL ONCE
Status: COMPLETED | OUTPATIENT
Start: 2019-09-16 | End: 2019-09-16

## 2019-09-16 RX ORDER — SODIUM CHLORIDE 0.9 % (FLUSH) 0.9 %
10 SYRINGE (ML) INJECTION
Status: DISCONTINUED | OUTPATIENT
Start: 2019-09-16 | End: 2019-09-18 | Stop reason: HOSPADM

## 2019-09-16 RX ORDER — POLYETHYLENE GLYCOL 3350 17 G/17G
17 POWDER, FOR SOLUTION ORAL DAILY
Status: DISCONTINUED | OUTPATIENT
Start: 2019-09-16 | End: 2019-09-18 | Stop reason: HOSPADM

## 2019-09-16 RX ORDER — FAMOTIDINE 20 MG/1
20 TABLET, FILM COATED ORAL 2 TIMES DAILY
Status: DISCONTINUED | OUTPATIENT
Start: 2019-09-16 | End: 2019-09-18

## 2019-09-16 RX ORDER — CYCLOBENZAPRINE HCL 5 MG
5 TABLET ORAL
Status: DISCONTINUED | OUTPATIENT
Start: 2019-09-16 | End: 2019-09-16

## 2019-09-16 RX ORDER — SERTRALINE HYDROCHLORIDE 25 MG/1
25 TABLET, FILM COATED ORAL DAILY
Status: DISCONTINUED | OUTPATIENT
Start: 2019-09-16 | End: 2019-09-16

## 2019-09-16 RX ADMIN — FAMOTIDINE 20 MG: 20 TABLET ORAL at 10:09

## 2019-09-16 RX ADMIN — POTASSIUM CHLORIDE 10 MEQ: 750 TABLET, EXTENDED RELEASE ORAL at 10:09

## 2019-09-16 RX ADMIN — POTASSIUM CHLORIDE 40 MEQ: 1500 TABLET, EXTENDED RELEASE ORAL at 05:09

## 2019-09-16 RX ADMIN — ENOXAPARIN SODIUM 40 MG: 100 INJECTION SUBCUTANEOUS at 05:09

## 2019-09-16 RX ADMIN — SODIUM BICARBONATE: 84 INJECTION, SOLUTION INTRAVENOUS at 03:09

## 2019-09-16 RX ADMIN — POTASSIUM CHLORIDE 40 MEQ: 20 SOLUTION ORAL at 12:09

## 2019-09-16 RX ADMIN — CALCIUM GLUCONATE 500 MG: 98 INJECTION, SOLUTION INTRAVENOUS at 10:09

## 2019-09-16 RX ADMIN — ACTIVATED CHARCOAL 50 G: 208 SUSPENSION ORAL at 10:09

## 2019-09-16 RX ADMIN — SODIUM CHLORIDE 1000 ML: 9 INJECTION, SOLUTION INTRAVENOUS at 09:09

## 2019-09-16 RX ADMIN — SODIUM BICARBONATE: 84 INJECTION, SOLUTION INTRAVENOUS at 07:09

## 2019-09-16 NOTE — PROGRESS NOTES
1556 PEC re-faxed to [facility]  at 1550. Fax processed. Verified [facility] live received fax.  1600 discussed plan of care with poison control, recommendations received. Discussed lab orders with md Stout, new orders received. Reported pt with + ringing in his ears.

## 2019-09-16 NOTE — H&P
"[facility] Medicine  History & Physical    Patient Name: Donell Park  MRN: [id number]  Admission Date: 9/16/2019  Attending Physician: Keisah Stout MD   Primary Care Provider: Yenny Resendiz MD         Patient information was obtained from patient and ER records.     Subjective:     Principal Problem:Suicide attempt by drug ingestion    Chief Complaint:   Chief Complaint   Patient presents with    Suicide Attempt     x 1 hour ago "i took over 200 pills of asa and seroquel [place] [employer] [place]" pt girlfriend states he left a note and she went looking for him. denies pain, n/v        HPI: 43 y.o. male with a PMHx of ADHD, Depression, and Anxiety who presents to the Emergency Department with a cc of suicide attempt that happened 1 hour prior to arrival. Pt reports taking 200 pills of aspirin and Seroquel but is unsure how many. Pt's girlfriend reports pt left a note for her and she went looking for him. She also reports pt has not been able to spend time with his kids as much as he would like. Pt denies pain, nausea, vomiting, or any further associated symptoms. Pt's girlfriend reports he has tried suicide in the past. No known drug allergies    Pt PEC in ED. GIven charcoal and started sodium bicarbonate infusion. No severe EKG or electrolyte abnormalities. Transferred to ICU for close monitoring.  Pt reports this is his 4 suicide attempt. When asked what medications he takes he could not name them but later in conversation he named all of them. He reports ringing in the ears. Salicylate level is 45. Drug screen negative. Girlfriend present.     Past Medical History:   Diagnosis Date    Acute gastritis without hemorrhage 06/03/2019    aspirin induced    Addiction to drug     [facility]    ADHD (attention deficit hyper[facility])     [facility]    Anxiety     Attempted suicide 06/20/19    pt stated attempted 2 weeks ago- aspirin "    Depression     Hallucination     Heavy smoker (more than 20 cigarettes per day)     History of psychiatric hospitalization     History of rectal bleeding 2012    OSH Colonoscopy without polyps.    Hx of psychiatric care     Major depression 6/24/2019    Major depressive disorder, recurrent, severe with psychotic features 6/25/2019    Migraine headache     Psychiatric problem     PTSD (post-traumatic stress disorder) 6/25/2019    Shingles 2013    Substance abuse     Suicide attempt     attempted twice    Therapy        Past Surgical History:   Procedure Laterality Date    CYST REMOVAL N/A 2013    near heart    GANGLION CYST EXCISION Right     wrist       Review of patient's allergies indicates:  No Known Allergies    No current facility-administered medications on file prior to encounter.      Current Outpatient Medications on File Prior to Encounter   Medication Sig    ARIPiprazole (ABILIFY) 10 MG Tab Take 1 tablet (10 mg total) by mouth once daily.    mirtazapine (REMERON) 30 MG tablet Take 1 tablet (30 mg total) by mouth every evening.    QUEtiapine (SEROQUEL) 200 MG Tab Taper off: Take 1/2 a tablet a night for 7 nights then take 1/2 tab every other night for 7 doses then stop.    sertraline (ZOLOFT) 25 MG tablet Take 1 tablet (25 mg total) by mouth once daily.     Family History     Problem Relation (Age of Onset)    Depression Maternal Aunt    Diabetes Father    Hypertension Father    Lung cancer Father, Maternal Grandmother, Maternal Grandfather, Paternal Grandmother, Paternal Grandfather, Maternal Uncle, Maternal Uncle    Stroke Mother        Tobacco Use    Smoking status: Current Every Day Smoker     Packs/day: 1.00     Start date: 1997    Smokeless tobacco: Never Used   Substance and Sexual Activity    Alcohol use: No    Drug use: Yes     Frequency: 3.0 times per week     Types: Marijuana     Comment: States he quit in 2018.    Sexual activity: Yes     Partners: Female     Review  of Systems   Constitutional: Positive for activity change.   HENT: Positive for tinnitus.    Eyes: Negative.    Respiratory: Negative.    Cardiovascular: Negative.    Gastrointestinal: Positive for nausea.   Endocrine: Negative.    Genitourinary: Negative.    Musculoskeletal: Negative.    Neurological: Negative.    Psychiatric/Behavioral: Positive for dysphoric mood and suicidal ideas.     Objective:     Vital Signs (Most Recent):  Temp: 98.9 °F (37.2 °C) (09/16/19 1530)  Pulse: 103 (09/16/19 1604)  Resp: (!) 23 (09/16/19 1604)  BP: 113/79 (09/16/19 1602)  SpO2: 98 % (09/16/19 1604) Vital Signs (24h Range):  Temp:  [98.9 °F (37.2 °C)-99.1 °F (37.3 °C)] 98.9 °F (37.2 °C)  Pulse:  [] 103  Resp:  [23-25] 23  SpO2:  [95 %-98 %] 98 %  BP: (112-120)/(66-79) 113/79     Weight: 84.2 kg (185 lb 10 oz)  Body mass index is 29.07 kg/m².    Physical Exam   Constitutional: He is oriented to person, place, and time. He appears well-developed. No distress.   HENT:   Head: Normocephalic and atraumatic.   Mouth/Throat: Oropharynx is clear and moist.   Eyes: Pupils are equal, round, and reactive to light. EOM are normal.   Neck: Normal range of motion. Neck supple. No JVD present.   Cardiovascular: Regular rhythm, normal heart sounds and intact distal pulses.   tachycardia S1 S2   Pulmonary/Chest: Effort normal and breath sounds normal. No respiratory distress.   Abdominal: Soft. Bowel sounds are normal. He exhibits no distension.   Musculoskeletal: Normal range of motion. He exhibits no edema.   Neurological: He is alert and oriented to person, place, and time.   Skin: Skin is warm and dry. Capillary refill takes less than 2 seconds.   Psychiatric:   Flat affect          CRANIAL NERVES     CN III, IV, VI   Pupils are equal, round, and reactive to light.  Extraocular motions are normal.        Significant Labs:   BMP:   Recent Labs   Lab 09/16/19  0923 09/16/19  1223   * 75    140   K 3.9 4.3    108   CO2 23  23   BUN 17 15   CREATININE 1.0 1.1   CALCIUM 9.0 9.0   MG 2.0  --      CMP:   Recent Labs   Lab 09/16/19  0923 09/16/19  1223    140   K 3.9 4.3    108   CO2 23 23   * 75   BUN 17 15   CREATININE 1.0 1.1   CALCIUM 9.0 9.0   PROT 7.0  --    ALBUMIN 3.8  --    BILITOT 0.4  --    ALKPHOS 71  --    AST 18  --    ALT 18  --    ANIONGAP 11 9   EGFRNONAA >60 >60     Urine Studies:   Recent Labs   Lab 09/16/19  1217   COLORU Straw   APPEARANCEUA Clear   PHUR 6.0   SPECGRAV 1.010   PROTEINUA Negative   GLUCUA Negative   KETONESU Negative   BILIRUBINUA Negative   OCCULTUA Negative   NITRITE Negative   UROBILINOGEN Negative   LEUKOCYTESUR Negative       Significant Imaging: I have reviewed and interpreted all pertinent imaging results/findings within the past 24 hours.    Assessment/Plan:     * Suicide attempt by drug ingestion  Aspirin overdose  bicarboante infusion   Q2 hours check  BMp checks        Tinnitus of both ears  Secondary to aspirin toxicity     Severe episode of recurrent major depressive disorder, with psychotic features  PEC  1:1 sitter  + SI   Psychiatry consulted  When medically cleared will need placement   zyprexa PRN       Anxiety  PEC  All home meds held          VTE Risk Mitigation (From admission, onward)        Ordered     enoxaparin injection 40 mg  Daily      09/16/19 1633     IP VTE LOW RISK PATIENT  Once      09/16/19 1529     Place RAMONA hose  Until discontinued      09/16/19 1529        Critical care time spent on the evaluation and treatment of severe organ dysfunction, review of pertinent labs and imaging studies, discussions with consulting providers and discussions with patient/family:40 minutes.     Keisha Stout MD  Department of Hospital Medicine   [facility]
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
## 2019-09-16 NOTE — HPI
Donell Park is a 43 y.o. male who presents to [facility] with a complaint of suicide attempt that happened one hour prior to arrival. Donell Park reports taking 200 tablets of aspirin and is unsure of the number of Seroquel tablets that he took. Reportedly, his girlfriend reported that the patient left a note for her and she went looking for him and subsequently found him.

## 2019-09-16 NOTE — SUBJECTIVE & OBJECTIVE
Past Medical History:   Diagnosis Date    Acute gastritis without hemorrhage 06/03/2019    aspirin induced    Addiction to drug     [facility]    ADHD (attention deficit hyperactivity disorder)     a few years ago at [facility]    Anxiety     Attempted suicide 06/20/19    pt stated attempted 2 weeks ago- aspirin    Depression     Hallucination     Heavy smoker (more than 20 cigarettes per day)     History of psychiatric hospitalization     History of rectal bleeding 2012    OSH Colonoscopy without polyps.    Hx of psychiatric care     Major depression 6/24/2019    Major depressive disorder, recurrent, severe with psychotic features 6/25/2019    Migraine headache     Psychiatric problem     PTSD (post-traumatic stress disorder) 6/25/2019    Shingles 2013    Substance abuse     Suicide attempt     attempted twice    Therapy        Past Surgical History:   Procedure Laterality Date    CYST REMOVAL N/A 2013    near heart    GANGLION CYST EXCISION Right     wrist       Review of patient's allergies indicates:  No Known Allergies    No current facility-administered medications on file prior to encounter.      Current Outpatient Medications on File Prior to Encounter   Medication Sig    ARIPiprazole (ABILIFY) 10 MG Tab Take 1 tablet (10 mg total) by mouth once daily.    mirtazapine (REMERON) 30 MG tablet Take 1 tablet (30 mg total) by mouth every evening.    QUEtiapine (SEROQUEL) 200 MG Tab Taper off: Take 1/2 a tablet a night for 7 nights then take 1/2 tab every other night for 7 doses then stop.    sertraline (ZOLOFT) 25 MG tablet Take 1 tablet (25 mg total) by mouth once daily.     Family History     Problem Relation (Age of Onset)    Depression Maternal Aunt    Diabetes Father    Hypertension Father    Lung cancer Father, Maternal Grandmother, Maternal Grandfather, Paternal Grandmother, Paternal Grandfather, Maternal Uncle, Maternal Uncle    Stroke Mother        Tobacco Use     Smoking status: Current Every Day Smoker     Packs/day: 1.00     Start date: 1997    Smokeless tobacco: Never Used   Substance and Sexual Activity    Alcohol use: No    Drug use: Yes     Frequency: 3.0 times per week     Types: Marijuana     Comment: States he quit in 2018.    Sexual activity: Yes     Partners: Female     Review of Systems   Constitutional: Positive for activity change.   HENT: Positive for tinnitus.    Eyes: Negative.    Respiratory: Negative.    Cardiovascular: Negative.    Gastrointestinal: Positive for nausea.   Endocrine: Negative.    Genitourinary: Negative.    Musculoskeletal: Negative.    Neurological: Negative.    Psychiatric/Behavioral: Positive for dysphoric mood and suicidal ideas.     Objective:     Vital Signs (Most Recent):  Temp: 98.9 °F (37.2 °C) (09/16/19 1530)  Pulse: 103 (09/16/19 1604)  Resp: (!) 23 (09/16/19 1604)  BP: 113/79 (09/16/19 1602)  SpO2: 98 % (09/16/19 1604) Vital Signs (24h Range):  Temp:  [98.9 °F (37.2 °C)-99.1 °F (37.3 °C)] 98.9 °F (37.2 °C)  Pulse:  [] 103  Resp:  [23-25] 23  SpO2:  [95 %-98 %] 98 %  BP: (112-120)/(66-79) 113/79     Weight: 84.2 kg (185 lb 10 oz)  Body mass index is 29.07 kg/m².    Physical Exam   Constitutional: He is oriented to person, place, and time. He appears well-developed. No distress.   HENT:   Head: Normocephalic and atraumatic.   Mouth/Throat: Oropharynx is clear and moist.   Eyes: Pupils are equal, round, and reactive to light. EOM are normal.   Neck: Normal range of motion. Neck supple. No JVD present.   Cardiovascular: Regular rhythm, normal heart sounds and intact distal pulses.   tachycardia S1 S2   Pulmonary/Chest: Effort normal and breath sounds normal. No respiratory distress.   Abdominal: Soft. Bowel sounds are normal. He exhibits no distension.   Musculoskeletal: Normal range of motion. He exhibits no edema.   Neurological: He is alert and oriented to person, place, and time.   Skin: Skin is warm and dry.  Capillary refill takes less than 2 seconds.   Psychiatric:   Flat affect          CRANIAL NERVES     CN III, IV, VI   Pupils are equal, round, and reactive to light.  Extraocular motions are normal.        Significant Labs:   BMP:   Recent Labs   Lab 09/16/19  0923 09/16/19  1223   * 75    140   K 3.9 4.3    108   CO2 23 23   BUN 17 15   CREATININE 1.0 1.1   CALCIUM 9.0 9.0   MG 2.0  --      CMP:   Recent Labs   Lab 09/16/19  0923 09/16/19  1223    140   K 3.9 4.3    108   CO2 23 23   * 75   BUN 17 15   CREATININE 1.0 1.1   CALCIUM 9.0 9.0   PROT 7.0  --    ALBUMIN 3.8  --    BILITOT 0.4  --    ALKPHOS 71  --    AST 18  --    ALT 18  --    ANIONGAP 11 9   EGFRNONAA >60 >60     Urine Studies:   Recent Labs   Lab 09/16/19  1217   COLORU Straw   APPEARANCEUA Clear   PHUR 6.0   SPECGRAV 1.010   PROTEINUA Negative   GLUCUA Negative   KETONESU Negative   BILIRUBINUA Negative   OCCULTUA Negative   NITRITE Negative   UROBILINOGEN Negative   LEUKOCYTESUR Negative       Significant Imaging: I have reviewed and interpreted all pertinent imaging results/findings within the past 24 hours.
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
## 2019-09-16 NOTE — ASSESSMENT & PLAN NOTE
PEC  1:1 sitter  + SI   Psychiatry consulted  When medically cleared will need placement   zyprexa PRN

## 2019-09-16 NOTE — HOSPITAL COURSE
"Donell Park presents lying supine on a hospital stretcher wearing paper hospital attire. He is known to this provider from three previous outpatient clinic visits. He is awake and alert and oriented to person, place, time and situation. He states he came into the hospital because he attempted to kill himself by overdosing on 200 ASA tablets. Donell Park has made multiple suicide attempts in the past and has attempted suicide by overdose of 200 ASA tablets in the past. On June 24, 2019 after his first office visit with this provider, he was PEC'd with SI with a plan to overdose on 200 ASA tablets. He was discharged from [facility] on 07/07/2019. He was diagnosed with Major depressive disorder , recurrent , severe with psychotic features and PTSD. He was discharged on Prozac 30 mg daily, Abilify 15mg daily, Quetiapine 200 mg PO QHS, Mirtazapine 30 mg PO QHS for insomnia and Vistaril PRN for anxiety. On 07/08/2019 he went to a follow up appointment with his PCP and requested Adderall for ADHD. On 08/07/2019 he followed up with this provider in the clinic and was prescribed Zoloft 25 mg po qd, and vistaril 25 mg po qd prn anxiety, and was continued on Remeron 30 mg po qhs, Quetiapine 200 mg po qhs, and Abilify 10 mg po qd. He was asking for Adderall for ADHD and reported that he was using THCA to treat his feelings of anxiety. Today he states that he tried to kill himself by ASA overdose and feels that he is a "failure" at that too. He states that he does want to try to kill himself again. He admits that he does not take any of his prescribed medications and states, "I just don't." He states he is seeing shadows and hears voices that "tell me things." He does not divulge what the voices say upon questioning multiple times. Donell Park is suicidal. Agree with PEC.    His overall symptom complex includes: depressed mood, poor sleep, endorses paranoia, AVHs, SI with plan to overdose, SI with " attempt to overdose; History of: racing thoughts, poor concentration, anhedonia, feelings of guilt, multiple suicide attempts

## 2019-09-16 NOTE — HPI
43 y.o. male with a PMHx of ADHD, Depression, and Anxiety who presents to the Emergency Department with a cc of suicide attempt that happened 1 hour prior to arrival. Pt reports taking 200 pills of aspirin and Seroquel but is unsure how many. Pt's girlfriend reports pt left a note for her and she went looking for him. She also reports pt has not been able to spend time with his kids as much as he would like. Pt denies pain, nausea, vomiting, or any further associated symptoms. Pt's girlfriend reports he has tried suicide in the past. No known drug allergies    Pt PEC in ED. GIven charcoal and started sodium bicarbonate infusion. No severe EKG or electrolyte abnormalities. Transferred to ICU for close monitoring.  Pt reports this is his 4 suicide attempt. When asked what medications he takes he could not name them but later in conversation he named all of them. He reports ringing in the ears. Salicylate level is 45. Drug screen negative. Girlfriend present.

## 2019-09-16 NOTE — PLAN OF CARE
Problem: Adult Inpatient Plan of Care  Goal: Plan of Care Review  Outcome: Ongoing (interventions implemented as appropriate)  Plan of care reviewed with pt. 1:1 PEC. Pt rights provided, pt verbalizes understanding. Calm and accepting. Suicide screen complete. Labs and urine monitored. Monitoring vital signs. Denies pain.

## 2019-09-16 NOTE — ED TRIAGE NOTES
Pt took 200 pills this morning in an attempt to commit suicide. Pt. States he has a prescription for 90 seroquel and the rest of the pills were aspirin. Pt. Is alert and oriented, flat affect. Calm and cooperative.

## 2019-09-16 NOTE — ED PROVIDER NOTES
"Encounter Date: 9/16/2019    SCRIBE #1 NOTE: I, Radha Rodrigez, am scribing for, and in the presence of,  Alonzo Resendiz MD. I have scribed the following portions of the note - Other sections scribed: HPI,ROS.       History     Chief Complaint   Patient presents with    Suicide Attempt     x 1 hour ago "i took over 200 pills of asa and seroquel behind [employer] in the woods" pt girlfriend states he left a note and she went looking for him. denies pain, n/v     CC: Suicide attempt     HPI:  This is a 43 y.o. male with a PMHx of ADHD, Depression, and Anxiety who presents to the Emergency Department with a cc of suicide attempt that happened 1 hour prior to arrival. Pt reports taking 200 pills of aspirin and Seroquel but is unsure how many. Pt's girlfriend reports pt left a note for her and she went looking for him. She also reports pt has not been able to spend time with his kids as much as he would like. Pt denies pain, nausea, vomiting, or any further associated symptoms. Pt's girlfriend reports he has tried suicide in the past. No known drug allergies.                     The history is provided by the patient and a significant other. No  was used.     Review of patient's allergies indicates:  No Known Allergies  Past Medical History:   Diagnosis Date    Acute gastritis without hemorrhage 06/03/2019    aspirin induced    Addiction to drug     [facility]    ADHD (attention deficit hyperactivity disorder)     a few years ago at [facility]    Anxiety     Attempted suicide 06/20/19    pt stated attempted 2 weeks ago- aspirin    Depression     Hallucination     Heavy smoker (more than 20 cigarettes per day)     History of psychiatric hospitalization     History of rectal bleeding 2012    OSH Colonoscopy without polyps.    Hx of psychiatric care     Major depression 6/24/2019    Major depressive disorder, recurrent, severe with psychotic features 6/25/2019    Migraine headache  "    Psychiatric problem     PTSD (post-traumatic stress disorder) 6/25/2019    Shingles 2013    Substance abuse     Suicide attempt     attempted twice    Therapy      Past Surgical History:   Procedure Laterality Date    CYST REMOVAL N/A 2013    near heart    GANGLION CYST EXCISION Right     wrist     Family History   Problem Relation Age of Onset    Stroke Mother     Diabetes Father     Hypertension Father     Lung cancer Father     Lung cancer Maternal Grandmother     Lung cancer Maternal Grandfather     Lung cancer Paternal Grandmother     Lung cancer Paternal Grandfather     Lung cancer Maternal Uncle     Lung cancer Maternal Uncle     Depression Maternal Aunt      Social History     Tobacco Use    Smoking status: Current Every Day Smoker     Packs/day: 1.00     Start date: 1997    Smokeless tobacco: Never Used   Substance Use Topics    Alcohol use: No    Drug use: Yes     Frequency: 3.0 times per week     Types: Marijuana     Comment: States he quit in 2018.     Review of Systems   Constitutional: Negative for chills, diaphoresis and fever.        (-)Pain    HENT: Negative for ear pain and sore throat.    Eyes: Negative for pain.   Respiratory: Negative for cough and shortness of breath.    Cardiovascular: Negative for chest pain.   Gastrointestinal: Negative for abdominal pain, nausea and vomiting.   Genitourinary: Negative for dysuria.   Musculoskeletal: Negative for back pain and neck pain.   Skin: Negative for rash.   Neurological: Negative for headaches.   Psychiatric/Behavioral: Positive for suicidal ideas.       Physical Exam     Initial Vitals   BP Pulse Resp Temp SpO2   09/16/19 1102 09/16/19 0857 09/16/19 0857 09/16/19 0857 09/16/19 0857   116/74 (!) 141 (!) 24 99.1 °F (37.3 °C) 95 %      MAP       --                Physical Exam  The patient was examined specifically for the following:   General:No significant distress, Good color, Warm and dry. Head and neck:Scalp atraumatic,  Neck supple. Neurological:Appropriate conversation, Gross motor deficits. Eyes:Conjugate gaze, Clear corneas. ENT: No epistaxis. Cardiac: Regular rate and rhythm, Grossly normal heart tones. Pulmonary: Wheezing, Rales. Gastrointestinal: Abdominal tenderness, Abdominal distention. Musculoskeletal: Extremity deformity, Apparent pain with range of motion of the joints. Skin: Rash.   The findings on examination were normal except for the following:  The patient is sleepy.  He is tachycardic with a heart rate of 132 during the physical examination. He admits to being suicidal.  The abdomen is soft.  The patient is responding.  ED Course   Critical Care  Date/Time: 9/16/2019 2:23 PM  Performed by: Alonzo Cunningham MD  Authorized by: Alonzo Cunningham MD   Direct patient critical care time: 22 minutes  Additional history critical care time: 11 minutes  Ordering / reviewing critical care time: 11 minutes  Documentation critical care time: 11 minutes  Consulting other physicians critical care time: 11 minutes  Consult with family critical care time: 4 minutes  Total critical care time (exclusive of procedural time) : 70 minutes  Critical care time was exclusive of separately billable procedures and treating other patients and teaching time.  Critical care was necessary to treat or prevent imminent or life-threatening deterioration of the following conditions: toxidrome.  Critical care was time spent personally by me on the following activities: development of treatment plan with patient or surrogate, discussions with primary provider, evaluation of patient's response to treatment, examination of patient, obtaining history from patient or surrogate, ordering and performing treatments and interventions, ordering and review of laboratory studies, ordering and review of radiographic studies, pulse oximetry, re-evaluation of patient's condition and review of old charts.        Labs Reviewed   COMPREHENSIVE METABOLIC PANEL -  Abnormal; Notable for the following components:       Result Value    Glucose 111 (*)     All other components within normal limits   CBC W/ AUTO DIFFERENTIAL - Abnormal; Notable for the following components:    Mean Corpuscular Hemoglobin 32.2 (*)     Lymph% 17.7 (*)     All other components within normal limits   SALICYLATE LEVEL - Abnormal; Notable for the following components:    Salicylate Lvl <5.0 (*)     All other components within normal limits   ACETAMINOPHEN LEVEL - Abnormal; Notable for the following components:    Acetaminophen (Tylenol), Serum <3.0 (*)     All other components within normal limits   SALICYLATE LEVEL - Abnormal; Notable for the following components:    Salicylate Lvl 45.0 (*)     All other components within normal limits    Narrative:     SALIC critical result(s) called and verbal readback obtained from   CHRISTIE DE LA ROSA, 09/16/2019 13:43   ISTAT PROCEDURE - Abnormal; Notable for the following components:    POC SATURATED O2 90 (*)     All other components within normal limits   ISTAT PROCEDURE - Abnormal; Notable for the following components:    POC Glucose 112 (*)     All other components within normal limits   MAGNESIUM   DRUG SCREEN PANEL, URINE EMERGENCY   TSH   ALCOHOL,MEDICAL (ETHANOL)   APTT   PROTIME-INR   BASIC METABOLIC PANEL   URINALYSIS, REFLEX TO URINE CULTURE    Narrative:     Preferred Collection Type->Urine, Clean Catch   ISTAT CHEM8     EKG Readings: (Independently Interpreted)   This patient is in a sinus tachycardia with a heart rate of 122.  There are no significant ST segment or T-wave changes.  The patient has a normal axis.  There is no evidence of acute myocardial infarction or malignant arrhythmia.       Imaging Results          X-Ray Abdomen AP 1 View (KUB) (Final result)  Result time 09/16/19 10:14:12    Final result by Bony Flaherty MD (09/16/19 10:14:12)                 Impression:      No significant radiographic abnormalities within the  abdomen.      Electronically signed by: Bony Flaherty MD  Date:    09/16/2019  Time:    10:14             Narrative:    EXAMINATION:  XR ABDOMEN AP 1 VIEW    CLINICAL HISTORY:  Poisoning by aspirin, accidental (unintentional), initial encounter    TECHNIQUE:  AP View(s) of the abdomen was performed.    COMPARISON:  None    FINDINGS:  Nonspecific bowel gas pattern without definite signs for bowel obstruction or perforation or ileus.  Soft tissues are unremarkable.  There is no abnormal intra-abdominal calcifications.  The osseous structures of the abdomen are unremarkable.                               X-Ray Chest 1 View (Final result)  Result time 09/16/19 10:13:08    Final result by Bony Flaherty MD (09/16/19 10:13:08)                 Impression:      No acute cardiopulmonary process.      Electronically signed by: Bony Flaherty MD  Date:    09/16/2019  Time:    10:13             Narrative:    EXAMINATION:  XR CHEST 1 VIEW    CLINICAL HISTORY:  Poisoning by aspirin, accidental (unintentional), initial encounter    TECHNIQUE:  Single frontal view of the chest was performed.    COMPARISON:  Chest 11/11/2018    FINDINGS:  Heart size is within normal limits.  Mediastinum is unremarkable.  Low lung volumes when compared to the previous study.  There are no acute lobar consolidations or pneumothorax or pulmonary vascular congestion or pleural effusions.  There are cardiac monitoring leads over the chest.                                  Medical decision making:  Given the above this patient presents to the emergency room after leaving a suicide note.  He disappeared into the woods and claims to have taken 200 tablets of aspirin plus Seroquel.  The patient is trying to end his life because he is upset over visitation with his children.  He presented with a heart rate of 141.  His respiratory rate is 24.  His aspirin level, 2nd draw, was 45.  I will admit the patient to the ICU, alcohol eyes is urine.  We will follow  recommendations from poison Control.  The case was presented to  at 2:10 p.m..             Scribe Attestation:   Scribe #1: I performed the above scribed service and the documentation accurately describes the services I performed. I attest to the accuracy of the note.               Clinical Impression:       ICD-10-CM ICD-9-CM   1. Suicide attempt by drug ingestion, initial encounter T50.902A 977.9     E950.5   2. Aspirin overdose T39.011A 965.1     E980.0   3. Suicide attempt by substance overdose, initial encounter T65.92XA 989.9     E950.9            I personally performed the services described in this documentation.  All medical record  entries made by the scribe are at my direction and in my presence.  Signed, Dr. Octavio Cunningham MD  09/16/19 1426       Alonzo Cunningham MD  09/16/19 1423

## 2019-09-16 NOTE — ASSESSMENT & PLAN NOTE
His overall symptom complex includes: depressed mood, poor sleep, endorses paranoia, AVHs, SI with plan to overdose, SI with attempt to overdose on 200 tablets of ASA and unknown amount of Seroquel; History of: racing thoughts, poor concentration, anhedonia, feelings of guilt, multiple suicide attempts.    Agree with PEC. Continue 1: 1 sitter. Notify  of PEC status. Inpatient Psychiatric facility once medically cleared. Upon transfer, please send original PEC/CEC with patient and place copy on the chart. Utilize Zyprexa 10 mg IM q 8 hours prn agitation or uncontrollable threatening behavior.

## 2019-09-17 LAB
ANION GAP SERPL CALC-SCNC: 6 MMOL/L (ref 8–16)
ANION GAP SERPL CALC-SCNC: 7 MMOL/L (ref 8–16)
ANION GAP SERPL CALC-SCNC: 8 MMOL/L (ref 8–16)
ANION GAP SERPL CALC-SCNC: 8 MMOL/L (ref 8–16)
BACTERIA #/AREA URNS HPF: ABNORMAL /HPF
BACTERIA #/AREA URNS HPF: NORMAL /HPF
BASOPHILS # BLD AUTO: 0.03 K/UL (ref 0–0.2)
BASOPHILS NFR BLD: 0.3 % (ref 0–1.9)
BILIRUB UR QL STRIP: NEGATIVE
BUN SERPL-MCNC: 14 MG/DL (ref 6–20)
BUN SERPL-MCNC: 15 MG/DL (ref 6–20)
BUN SERPL-MCNC: 17 MG/DL (ref 6–20)
BUN SERPL-MCNC: 17 MG/DL (ref 6–20)
CALCIUM SERPL-MCNC: 7.8 MG/DL (ref 8.7–10.5)
CALCIUM SERPL-MCNC: 7.8 MG/DL (ref 8.7–10.5)
CALCIUM SERPL-MCNC: 7.9 MG/DL (ref 8.7–10.5)
CALCIUM SERPL-MCNC: 7.9 MG/DL (ref 8.7–10.5)
CHLORIDE SERPL-SCNC: 107 MMOL/L (ref 95–110)
CHLORIDE SERPL-SCNC: 108 MMOL/L (ref 95–110)
CHLORIDE SERPL-SCNC: 109 MMOL/L (ref 95–110)
CHLORIDE SERPL-SCNC: 109 MMOL/L (ref 95–110)
CLARITY UR: CLEAR
CO2 SERPL-SCNC: 24 MMOL/L (ref 23–29)
CO2 SERPL-SCNC: 24 MMOL/L (ref 23–29)
CO2 SERPL-SCNC: 25 MMOL/L (ref 23–29)
CO2 SERPL-SCNC: 27 MMOL/L (ref 23–29)
COLOR UR: YELLOW
CREAT SERPL-MCNC: 1 MG/DL (ref 0.5–1.4)
CREAT SERPL-MCNC: 1 MG/DL (ref 0.5–1.4)
CREAT SERPL-MCNC: 1.1 MG/DL (ref 0.5–1.4)
CREAT SERPL-MCNC: 1.1 MG/DL (ref 0.5–1.4)
DIFFERENTIAL METHOD: ABNORMAL
EOSINOPHIL # BLD AUTO: 0.3 K/UL (ref 0–0.5)
EOSINOPHIL NFR BLD: 3.9 % (ref 0–8)
ERYTHROCYTE [DISTWIDTH] IN BLOOD BY AUTOMATED COUNT: 14.5 % (ref 11.5–14.5)
EST. GFR  (AFRICAN AMERICAN): >60 ML/MIN/1.73 M^2
EST. GFR  (NON AFRICAN AMERICAN): >60 ML/MIN/1.73 M^2
GLUCOSE SERPL-MCNC: 103 MG/DL (ref 70–110)
GLUCOSE SERPL-MCNC: 108 MG/DL (ref 70–110)
GLUCOSE SERPL-MCNC: 114 MG/DL (ref 70–110)
GLUCOSE SERPL-MCNC: 95 MG/DL (ref 70–110)
GLUCOSE UR QL STRIP: ABNORMAL
HCT VFR BLD AUTO: 42.3 % (ref 40–54)
HGB BLD-MCNC: 14.3 G/DL (ref 14–18)
HGB UR QL STRIP: NEGATIVE
HYALINE CASTS #/AREA URNS LPF: 0 /LPF
HYALINE CASTS #/AREA URNS LPF: NORMAL /LPF
KETONES UR QL STRIP: ABNORMAL
KETONES UR QL STRIP: NEGATIVE
KETONES UR QL STRIP: NEGATIVE
LEUKOCYTE ESTERASE UR QL STRIP: NEGATIVE
LYMPHOCYTES # BLD AUTO: 2 K/UL (ref 1–4.8)
LYMPHOCYTES NFR BLD: 22.9 % (ref 18–48)
MCH RBC QN AUTO: 30.8 PG (ref 27–31)
MCHC RBC AUTO-ENTMCNC: 33.8 G/DL (ref 32–36)
MCV RBC AUTO: 91 FL (ref 82–98)
MICROSCOPIC COMMENT: ABNORMAL
MICROSCOPIC COMMENT: NORMAL
MONOCYTES # BLD AUTO: 1.5 K/UL (ref 0.3–1)
MONOCYTES NFR BLD: 17.8 % (ref 4–15)
NEUTROPHILS # BLD AUTO: 4.7 K/UL (ref 1.8–7.7)
NEUTROPHILS NFR BLD: 55.1 % (ref 38–73)
NITRITE UR QL STRIP: NEGATIVE
PH UR STRIP: 7 [PH] (ref 5–8)
PH UR STRIP: 7 [PH] (ref 5–8)
PH UR STRIP: 8 [PH] (ref 5–8)
PLATELET # BLD AUTO: 242 K/UL (ref 150–350)
PMV BLD AUTO: 10.6 FL (ref 9.2–12.9)
POTASSIUM SERPL-SCNC: 3.6 MMOL/L (ref 3.5–5.1)
POTASSIUM SERPL-SCNC: 3.7 MMOL/L (ref 3.5–5.1)
POTASSIUM SERPL-SCNC: 3.8 MMOL/L (ref 3.5–5.1)
POTASSIUM SERPL-SCNC: 4.4 MMOL/L (ref 3.5–5.1)
PROT UR QL STRIP: ABNORMAL
PROT UR QL STRIP: ABNORMAL
PROT UR QL STRIP: NEGATIVE
RBC # BLD AUTO: 4.65 M/UL (ref 4.6–6.2)
RBC #/AREA URNS HPF: 0 /HPF (ref 0–4)
RBC #/AREA URNS HPF: 1 /HPF (ref 0–4)
SALICYLATES SERPL-MCNC: 11.4 MG/DL (ref 15–30)
SALICYLATES SERPL-MCNC: 15.8 MG/DL (ref 15–30)
SALICYLATES SERPL-MCNC: 21.1 MG/DL (ref 15–30)
SALICYLATES SERPL-MCNC: 25.2 MG/DL (ref 15–30)
SODIUM SERPL-SCNC: 139 MMOL/L (ref 136–145)
SODIUM SERPL-SCNC: 141 MMOL/L (ref 136–145)
SP GR UR STRIP: 1.02 (ref 1–1.03)
SP GR UR STRIP: 1.02 (ref 1–1.03)
SP GR UR STRIP: 1.03 (ref 1–1.03)
SQUAMOUS #/AREA URNS HPF: NORMAL /HPF
URN SPEC COLLECT METH UR: ABNORMAL
UROBILINOGEN UR STRIP-ACNC: NEGATIVE EU/DL
WBC # BLD AUTO: 8.64 K/UL (ref 3.9–12.7)
WBC #/AREA URNS HPF: 1 /HPF (ref 0–5)
WBC #/AREA URNS HPF: 6 /HPF (ref 0–5)

## 2019-09-17 PROCEDURE — 85025 COMPLETE CBC W/AUTO DIFF WBC: CPT

## 2019-09-17 PROCEDURE — 63600175 PHARM REV CODE 636 W HCPCS: Performed by: HOSPITALIST

## 2019-09-17 PROCEDURE — 81000 URINALYSIS NONAUTO W/SCOPE: CPT

## 2019-09-17 PROCEDURE — 25000003 PHARM REV CODE 250: Performed by: INTERNAL MEDICINE

## 2019-09-17 PROCEDURE — 80048 BASIC METABOLIC PNL TOTAL CA: CPT

## 2019-09-17 PROCEDURE — S4991 NICOTINE PATCH NONLEGEND: HCPCS | Performed by: HOSPITALIST

## 2019-09-17 PROCEDURE — 80307 DRUG TEST PRSMV CHEM ANLYZR: CPT

## 2019-09-17 PROCEDURE — 80048 BASIC METABOLIC PNL TOTAL CA: CPT | Mod: 91

## 2019-09-17 PROCEDURE — 81003 URINALYSIS AUTO W/O SCOPE: CPT

## 2019-09-17 PROCEDURE — 63600175 PHARM REV CODE 636 W HCPCS: Performed by: CLINIC/CENTER

## 2019-09-17 PROCEDURE — 25000003 PHARM REV CODE 250: Performed by: HOSPITALIST

## 2019-09-17 PROCEDURE — 36415 COLL VENOUS BLD VENIPUNCTURE: CPT

## 2019-09-17 PROCEDURE — 21400001 HC TELEMETRY ROOM

## 2019-09-17 RX ORDER — SERTRALINE HYDROCHLORIDE 25 MG/1
25 TABLET, FILM COATED ORAL DAILY
Status: DISCONTINUED | OUTPATIENT
Start: 2019-09-17 | End: 2019-09-18 | Stop reason: HOSPADM

## 2019-09-17 RX ORDER — POTASSIUM CHLORIDE 7.45 MG/ML
20 INJECTION INTRAVENOUS ONCE
Status: COMPLETED | OUTPATIENT
Start: 2019-09-17 | End: 2019-09-17

## 2019-09-17 RX ORDER — MIRTAZAPINE 15 MG/1
30 TABLET, FILM COATED ORAL NIGHTLY
Status: DISCONTINUED | OUTPATIENT
Start: 2019-09-17 | End: 2019-09-18 | Stop reason: HOSPADM

## 2019-09-17 RX ORDER — ACETAMINOPHEN 325 MG/1
650 TABLET ORAL EVERY 6 HOURS PRN
Status: DISCONTINUED | OUTPATIENT
Start: 2019-09-17 | End: 2019-09-18 | Stop reason: HOSPADM

## 2019-09-17 RX ADMIN — CALCIUM GLUCONATE 500 MG: 98 INJECTION, SOLUTION INTRAVENOUS at 01:09

## 2019-09-17 RX ADMIN — FAMOTIDINE 20 MG: 20 TABLET ORAL at 08:09

## 2019-09-17 RX ADMIN — MIRTAZAPINE 30 MG: 15 TABLET, FILM COATED ORAL at 08:09

## 2019-09-17 RX ADMIN — ENOXAPARIN SODIUM 40 MG: 100 INJECTION SUBCUTANEOUS at 04:09

## 2019-09-17 RX ADMIN — ACETAMINOPHEN 650 MG: 325 TABLET, FILM COATED ORAL at 10:09

## 2019-09-17 RX ADMIN — POLYETHYLENE GLYCOL 3350 17 G: 17 POWDER, FOR SOLUTION ORAL at 10:09

## 2019-09-17 RX ADMIN — ACETAMINOPHEN 650 MG: 325 TABLET, FILM COATED ORAL at 08:09

## 2019-09-17 RX ADMIN — SODIUM BICARBONATE: 84 INJECTION, SOLUTION INTRAVENOUS at 03:09

## 2019-09-17 RX ADMIN — POTASSIUM CHLORIDE 20 MEQ: 7.46 INJECTION, SOLUTION INTRAVENOUS at 01:09

## 2019-09-17 RX ADMIN — NICOTINE 1 PATCH: 21 PATCH, EXTENDED RELEASE TRANSDERMAL at 09:09

## 2019-09-17 RX ADMIN — FAMOTIDINE 20 MG: 20 TABLET ORAL at 10:09

## 2019-09-17 RX ADMIN — SERTRALINE HYDROCHLORIDE 25 MG: 25 TABLET ORAL at 02:09

## 2019-09-17 NOTE — PROGRESS NOTES
1830 Paged MD Prejeant concerning pt's urine pH. MD notified of urine pH 7.0, continue current treatment.  1858 Paged MD Prejeant concerning pt's Salicylate level, 43.9. No new orders, continue current treatment.

## 2019-09-17 NOTE — PROGRESS NOTES
E - ICU    Was notified for K = 3.6 and Ca = 8.1    As per poison control to keep both electrolytes at higher range    Will order  10 meq KCl po and 500 mg Ca gluconate IV    Addendum:    At 1:30 am level essentially the same. Will repeat K and Ca gluconate replacement

## 2019-09-17 NOTE — PROGRESS NOTES
[facility] Medicine  Progress Note    Patient Name: Donell Park  MRN: [id number]  Patient Class: IP- Inpatient   Admission Date: 9/16/2019  Length of Stay: 1 days  Attending Physician: Jose Yu MD  Primary Care Provider: Yenny Resendiz MD      Subjective:     Principal Problem:Suicide attempt by drug ingestion      HPI:  43 y.o. male with a PMHx of ADHD, Depression, and Anxiety who presents to the Emergency Department with a cc of suicide attempt that happened 1 hour prior to arrival. Pt reports taking 200 pills of aspirin and Seroquel but is unsure how many. Pt's girlfriend reports pt left a note for her and she went looking for him. She also reports pt has not been able to spend time with his kids as much as he would like. Pt denies pain, nausea, vomiting, or any further associated symptoms. Pt's girlfriend reports he has tried suicide in the past. No known drug allergies    Pt PEC in ED. GIven charcoal and started sodium bicarbonate infusion. No severe EKG or electrolyte abnormalities. Transferred to ICU for close monitoring.  Pt reports this is his 4 suicide attempt. When asked what medications he takes he could not name them but later in conversation he named all of them. He reports ringing in the ears. Salicylate level is 45. Drug screen negative. Girlfriend present.     Overview/Hospital Course:  Patient admitted to ICU for salicylate overload secondary to intentional suicide attempted. Poison control contracted. Started on IV bicarb, electrolyte replacement while monitored closely in the ICU. Psych consulted. He was stable overall. Salicylate lvls now low. D/c IV bicarb and salicylate lvls checks. Stable for telemetry with PEC. He is still suicidal but not homicidal.    Interval History: No acute events overnight. Stable. Continues to have tinnitus. HA endorsed this AM. Continues to be suicidal.     Review of Systems   Constitutional: Positive for activity change.    HENT: Positive for tinnitus.         Headache   Eyes: Negative.    Respiratory: Negative.    Cardiovascular: Negative.    Gastrointestinal: Negative for abdominal pain, blood in stool, constipation and nausea.   Endocrine: Negative.    Genitourinary: Negative.    Musculoskeletal: Negative.    Skin: Negative.    Neurological: Negative.    Psychiatric/Behavioral: Positive for suicidal ideas.     Objective:     Vital Signs (Most Recent):  Temp: 98.5 °F (36.9 °C) (09/17/19 1200)  Pulse: 69 (09/17/19 1100)  Resp: 15 (09/17/19 1100)  BP: 100/67 (09/17/19 1000)  SpO2: 96 % (09/17/19 1100) Vital Signs (24h Range):  Temp:  [97.7 °F (36.5 °C)-98.9 °F (37.2 °C)] 98.5 °F (36.9 °C)  Pulse:  [] 69  Resp:  [15-33] 15  SpO2:  [92 %-100 %] 96 %  BP: (100-131)/(61-86) 100/67     Weight: 86 kg (189 lb 9.5 oz)  Body mass index is 29.69 kg/m².    Physical Exam   Constitutional: He is oriented to person, place, and time. He appears well-developed. No distress.   HENT:   Head: Normocephalic and atraumatic.   Mouth/Throat: Oropharynx is clear and moist.   Eyes: Pupils are equal, round, and reactive to light. EOM are normal.   Neck: Normal range of motion. Neck supple. No JVD present.   Cardiovascular: Normal rate, regular rhythm, normal heart sounds and intact distal pulses.   Pulmonary/Chest: Effort normal and breath sounds normal. No respiratory distress.   Abdominal: Soft. Bowel sounds are normal. He exhibits no distension.   Musculoskeletal: Normal range of motion. He exhibits no edema.   Neurological: He is alert and oriented to person, place, and time.   Skin: Skin is warm and dry. Capillary refill takes less than 2 seconds.   Psychiatric:   Flat affect. SI +, HI -        Significant Labs and Imaging reviewed      Assessment/Plan:      * Suicide attempt by drug ingestion  Aspirin overdose. Poison control contacted.  Bicarboante infusion started, now d/c  Stable for telemetry at this time    Tinnitus of both ears  Secondary to  aspirin toxicity    Severe episode of recurrent major depressive disorder, with psychotic features  PEC  1:1 sitter  + SI   Psychiatry consulted  When medically cleared will need placement   Zyprexa PRN    Anxiety  PEC  Resumed home SSI and mirtazapine  Holding Seroquel and abilify for now    VTE Risk Mitigation (From admission, onward)        Ordered     enoxaparin injection 40 mg  Daily      09/16/19 1633     IP VTE LOW RISK PATIENT  Once      09/16/19 1529     Place RAMONA hose  Until discontinued      09/16/19 1529            Jose Yu MD  Department of Hospital Medicine   [facility]

## 2019-09-17 NOTE — PLAN OF CARE
Transferred from ICU today. Has 1:1 alfredo.     09/17/19 1610   Discharge Assessment   Assessment Type Discharge Planning Assessment   Assessment information obtained from? Medical Record   Prior to hospitilization cognitive status: Alert/Oriented   Prior to hospitalization functional status: Independent   Current Functional Status: Independent   Is patient able to care for self after discharge? Unable to determine at this time (comments)   Patient's perception of discharge disposition admitted as an inpatient   Readmission Within the Last 30 Days unable to assess   Patient currently being followed by outpatient case management? No   Patient currently receives any other outside agency services? No   Equipment Currently Used at Home none   Do you have any problems affording any of your prescribed medications? No   Does the patient have transportation home? Yes   Transportation Anticipated family or friend will provide   Does the patient receive services at the [facility]madin Clinic? No   Discharge Plan A Psychiatric hospital   Discharge Plan B Other  (TBD)   DME Needed Upon Discharge  none   Patient/Family in Agreement with Plan unable to assess   Readmission Questionnaire   Have you felt down, depressed, or hopeless? 2   Have you felt little interest or pleasure in doing things? 2

## 2019-09-17 NOTE — PROVIDER TRANSFER
Patient admitted to ICU for salicylate overload secondary to intentional suicide attempted. Poison control contacted. Started on IV bicarb, electrolyte replacement while monitored closely in the ICU. Psych consulted. PEC'd. He was stable overall. Salicylate lvls now low. D/c IV bicarb and salicylate lvls checks. He is still suicidal but not homicidal. Stable for telemetry with PEC.

## 2019-09-17 NOTE — CARE UPDATE
[facility]  ICU Multidisciplinary Bedside Rounds   SUMMARY     Date: 9/17/2019    Prehospitalization: Home  Admit Date / LOS : 9/16/2019/ 1 days    Diagnosis: Suicide attempt by drug ingestion    Consults:        Active: Psych       Needed: N/A     Code Status: Full Code   Advanced Directive: <no information>    LDA: PIV       Central Lines/Site/Justification:Patient Does Not Have Central Line       Urinary Cath/Order/Justification:Patient Does Not Have Urinary Catheter    Vasopressors/Infusions:    sodium bicarbonate drip 150 mL/hr at 09/17/19 0400       CAM ICU: Negative  Pain Management: none       Pain Controlled: yes     Rhythm: NSR    Respiratory Device: Room Air          VTE Prophylaxis: Pharm  Mobility: Ambulatory  Stress Ulcer Prophylaxis: Yes    Dietary: PO  Tolerance: yes  /  Advancement: @ goal    Isolation: No active isolations    Restraints: No    Significant Dates:  Post Op Date: N/A  Rescue Date: N/A  Imaging/ Diagnostics: N/A    Noteworthy Labs:  Ca low (Ca gluconate given overnight x2)    CBC/Anemia Labs: Coags:    Recent Labs   Lab 09/16/19  0923 09/16/19  0949   WBC 7.28  --    HGB 16.4  --    HCT 46.8 46     --    MCV 92  --    RDW 13.9  --     Recent Labs   Lab 09/16/19  0924   INR 0.9   APTT 28.4        Chemistries:   Recent Labs   Lab 09/16/19  0923  09/16/19  2230 09/17/19  0011 09/17/19  0207      < > 141 141 141   K 3.9   < > 3.6 3.6 4.4      < > 107 109 109   CO2 23   < > 26 25 24   BUN 17   < > 16 17 17   CREATININE 1.0   < > 1.2 1.1 1.1   CALCIUM 9.0   < > 8.2* 7.9* 7.8*   PROT 7.0  --   --   --   --    BILITOT 0.4  --   --   --   --    ALKPHOS 71  --   --   --   --    ALT 18  --   --   --   --    AST 18  --   --   --   --    MG 2.0  --   --   --   --     < > = values in this interval not displayed.        Cardiac Enzymes: Ejection Fractions:    No results for input(s): CPK, CPKMB, MB, TROPONINI in the last 72 hours. No results found for: EF      POCT Glucose: HbA1c:    No results for input(s): POCTGLUCOSE in the last 168 hours. Hemoglobin A1C   Date Value Ref Range Status   11/12/2018 4.9 4.0 - 5.6 % Final     Comment:     ADA Screening Guidelines:  5.7-6.4%  Consistent with prediabetes  >or=6.5%  Consistent with diabetes  High levels of fetal hemoglobin interfere with the HbA1C  assay. Heterozygous hemoglobin variants (HbS, HgC, etc)do  not significantly interfere with this assay.   However, presence of multiple variants may affect accuracy.          Needs from Care Team: reduced frequency of lab draws? Salicylate level now 15.8. Accuchecks to monitor for hypoglycemia?     ICU LOS 12h  Level of Care: Critical Care

## 2019-09-17 NOTE — PLAN OF CARE
Problem: Adult Inpatient Plan of Care  Goal: Plan of Care Review  Outcome: Ongoing (interventions implemented as appropriate)  Pt remains in ICU under 1:1 observation. Bicarb gtt. BMP and salicylate level Q2H. Salicylate level down to 15.8. Supplemental potassium and calcium given x2. Pt drowsy but awakens easily. Calm and cooperative overnight. HR and BP WNL. No fever. Reports tinnitus still present.

## 2019-09-17 NOTE — HOSPITAL COURSE
Patient admitted to ICU for salicylate overload secondary to intentional suicide attempted. Poison control contracted. Started on IV bicarb, electrolyte replacement while monitored closely in the ICU. Psych consulted. He was stable overall. Salicylate level improved . D/c IV bicarb and salicylate lvls checks. Stable for telemetry with PEC. He was still suicidal but not homicidal.  At this time denies SI,psychitary recommending inpatient psych . Placement,consulted SW.  Patient has rosy transferred to inpatient psychiatry facility.

## 2019-09-17 NOTE — PLAN OF CARE
09/17/19 1613   Post-Acute Status   Post-Acute Authorization Placement   Post-Acute Placement Status Awaiting Internal Medical Clearance   Discharge Delays (!) Other  (awaiting med clearance and placement orders)

## 2019-09-17 NOTE — NURSING
Pt arrived from ICU via bed. Telemetry monitor on. No signs of distress noted. Security and charge nurse at bedside call light in MD марина notified. Safety sitter at bedside.

## 2019-09-17 NOTE — SUBJECTIVE & OBJECTIVE
Interval History: No acute events overnight. Stable. Continues to have tinnitus. HA endorsed this AM. Continues to be suicidal.     Review of Systems   Constitutional: Positive for activity change.   HENT: Positive for tinnitus.         Headache   Eyes: Negative.    Respiratory: Negative.    Cardiovascular: Negative.    Gastrointestinal: Negative for abdominal pain, blood in stool, constipation and nausea.   Endocrine: Negative.    Genitourinary: Negative.    Musculoskeletal: Negative.    Skin: Negative.    Neurological: Negative.    Psychiatric/Behavioral: Positive for suicidal ideas.     Objective:     Vital Signs (Most Recent):  Temp: 98.5 °F (36.9 °C) (09/17/19 1200)  Pulse: 69 (09/17/19 1100)  Resp: 15 (09/17/19 1100)  BP: 100/67 (09/17/19 1000)  SpO2: 96 % (09/17/19 1100) Vital Signs (24h Range):  Temp:  [97.7 °F (36.5 °C)-98.9 °F (37.2 °C)] 98.5 °F (36.9 °C)  Pulse:  [] 69  Resp:  [15-33] 15  SpO2:  [92 %-100 %] 96 %  BP: (100-131)/(61-86) 100/67     Weight: 86 kg (189 lb 9.5 oz)  Body mass index is 29.69 kg/m².    Physical Exam   Constitutional: He is oriented to person, place, and time. He appears well-developed. No distress.   HENT:   Head: Normocephalic and atraumatic.   Mouth/Throat: Oropharynx is clear and moist.   Eyes: Pupils are equal, round, and reactive to light. EOM are normal.   Neck: Normal range of motion. Neck supple. No JVD present.   Cardiovascular: Normal rate, regular rhythm, normal heart sounds and intact distal pulses.   Pulmonary/Chest: Effort normal and breath sounds normal. No respiratory distress.   Abdominal: Soft. Bowel sounds are normal. He exhibits no distension.   Musculoskeletal: Normal range of motion. He exhibits no edema.   Neurological: He is alert and oriented to person, place, and time.   Skin: Skin is warm and dry. Capillary refill takes less than 2 seconds.   Psychiatric:   Flat affect. SI +, HI -        Significant Labs and Imaging reviewed

## 2019-09-17 NOTE — PROGRESS NOTES
[facility]  ICU Multidisciplinary Bedside Rounds     UPDATE     Date: 9/17/2019      Plan of care reviewed with the following, Nurse, Charge Nurse, Physician, Pulm CC and Infection Prevention.       Needs/ Goals for the day: d/c serial labs that are no longer needed.  Psych consult/ social work for d/c needs and planning      Level of Care: OK to Transfer

## 2019-09-17 NOTE — ASSESSMENT & PLAN NOTE
Aspirin overdose. Poison control contacted.  Bicarboante infusion started, now d/c  Stable for telemetry at this time

## 2019-09-18 VITALS
HEIGHT: 67 IN | WEIGHT: 189.63 LBS | DIASTOLIC BLOOD PRESSURE: 71 MMHG | OXYGEN SATURATION: 96 % | BODY MASS INDEX: 29.76 KG/M2 | RESPIRATION RATE: 18 BRPM | SYSTOLIC BLOOD PRESSURE: 117 MMHG | TEMPERATURE: 98 F | HEART RATE: 85 BPM

## 2019-09-18 PROBLEM — F32.A DEPRESSION WITH SUICIDAL IDEATION: Status: ACTIVE | Noted: 2019-09-18

## 2019-09-18 PROBLEM — R45.851 DEPRESSION WITH SUICIDAL IDEATION: Status: ACTIVE | Noted: 2019-09-18

## 2019-09-18 LAB
ALBUMIN SERPL BCP-MCNC: 3.3 G/DL (ref 3.5–5.2)
ALP SERPL-CCNC: 59 U/L (ref 55–135)
ALT SERPL W/O P-5'-P-CCNC: 25 U/L (ref 10–44)
ANION GAP SERPL CALC-SCNC: 6 MMOL/L (ref 8–16)
AST SERPL-CCNC: 17 U/L (ref 10–40)
BASOPHILS # BLD AUTO: 0.05 K/UL (ref 0–0.2)
BASOPHILS NFR BLD: 0.6 % (ref 0–1.9)
BILIRUB SERPL-MCNC: 0.5 MG/DL (ref 0.1–1)
BUN SERPL-MCNC: 18 MG/DL (ref 6–20)
CALCIUM SERPL-MCNC: 8.7 MG/DL (ref 8.7–10.5)
CHLORIDE SERPL-SCNC: 110 MMOL/L (ref 95–110)
CO2 SERPL-SCNC: 24 MMOL/L (ref 23–29)
CREAT SERPL-MCNC: 1.1 MG/DL (ref 0.5–1.4)
DIFFERENTIAL METHOD: ABNORMAL
EOSINOPHIL # BLD AUTO: 0.6 K/UL (ref 0–0.5)
EOSINOPHIL NFR BLD: 6.4 % (ref 0–8)
ERYTHROCYTE [DISTWIDTH] IN BLOOD BY AUTOMATED COUNT: 14.7 % (ref 11.5–14.5)
EST. GFR  (AFRICAN AMERICAN): >60 ML/MIN/1.73 M^2
EST. GFR  (NON AFRICAN AMERICAN): >60 ML/MIN/1.73 M^2
GLUCOSE SERPL-MCNC: 86 MG/DL (ref 70–110)
HCT VFR BLD AUTO: 46 % (ref 40–54)
HGB BLD-MCNC: 15 G/DL (ref 14–18)
LYMPHOCYTES # BLD AUTO: 2.4 K/UL (ref 1–4.8)
LYMPHOCYTES NFR BLD: 27.7 % (ref 18–48)
MCH RBC QN AUTO: 30.2 PG (ref 27–31)
MCHC RBC AUTO-ENTMCNC: 32.6 G/DL (ref 32–36)
MCV RBC AUTO: 93 FL (ref 82–98)
MONOCYTES # BLD AUTO: 1.4 K/UL (ref 0.3–1)
MONOCYTES NFR BLD: 15.9 % (ref 4–15)
NEUTROPHILS # BLD AUTO: 4.3 K/UL (ref 1.8–7.7)
NEUTROPHILS NFR BLD: 49.4 % (ref 38–73)
PLATELET # BLD AUTO: 266 K/UL (ref 150–350)
PMV BLD AUTO: 10.4 FL (ref 9.2–12.9)
POTASSIUM SERPL-SCNC: 4.2 MMOL/L (ref 3.5–5.1)
PROT SERPL-MCNC: 6.2 G/DL (ref 6–8.4)
RBC # BLD AUTO: 4.97 M/UL (ref 4.6–6.2)
SODIUM SERPL-SCNC: 140 MMOL/L (ref 136–145)
WBC # BLD AUTO: 8.74 K/UL (ref 3.9–12.7)

## 2019-09-18 PROCEDURE — 25000003 PHARM REV CODE 250: Performed by: HOSPITALIST

## 2019-09-18 PROCEDURE — 25000003 PHARM REV CODE 250: Performed by: INTERNAL MEDICINE

## 2019-09-18 PROCEDURE — 36415 COLL VENOUS BLD VENIPUNCTURE: CPT

## 2019-09-18 PROCEDURE — 80053 COMPREHEN METABOLIC PANEL: CPT

## 2019-09-18 PROCEDURE — 85025 COMPLETE CBC W/AUTO DIFF WBC: CPT

## 2019-09-18 PROCEDURE — S4991 NICOTINE PATCH NONLEGEND: HCPCS | Performed by: HOSPITALIST

## 2019-09-18 RX ORDER — PANTOPRAZOLE SODIUM 40 MG/1
40 TABLET, DELAYED RELEASE ORAL DAILY
Status: DISCONTINUED | OUTPATIENT
Start: 2019-09-18 | End: 2019-09-18 | Stop reason: HOSPADM

## 2019-09-18 RX ORDER — TRAMADOL HYDROCHLORIDE 50 MG/1
50 TABLET ORAL EVERY 6 HOURS PRN
Status: DISCONTINUED | OUTPATIENT
Start: 2019-09-18 | End: 2019-09-18 | Stop reason: HOSPADM

## 2019-09-18 RX ADMIN — TRAMADOL HYDROCHLORIDE 50 MG: 50 TABLET, FILM COATED ORAL at 11:09

## 2019-09-18 RX ADMIN — PANTOPRAZOLE SODIUM 40 MG: 40 TABLET, DELAYED RELEASE ORAL at 11:09

## 2019-09-18 RX ADMIN — FAMOTIDINE 20 MG: 20 TABLET ORAL at 08:09

## 2019-09-18 RX ADMIN — ACETAMINOPHEN 650 MG: 325 TABLET, FILM COATED ORAL at 08:09

## 2019-09-18 RX ADMIN — NICOTINE 1 PATCH: 21 PATCH, EXTENDED RELEASE TRANSDERMAL at 08:09

## 2019-09-18 RX ADMIN — SERTRALINE HYDROCHLORIDE 25 MG: 25 TABLET ORAL at 08:09

## 2019-09-18 NOTE — ASSESSMENT & PLAN NOTE
Aspirin overdose. Poison control contacted.  Bicarboante infusion started, now d/c  Stable for telemetry at this time,  At this time denies SI,psychitary recommending inpatient psych . Placement,consulted NAHID.

## 2019-09-18 NOTE — SUBJECTIVE & OBJECTIVE
Interval History: denies SI at this time.    Review of Systems   Constitutional: Negative for activity change and appetite change.   HENT: Negative for congestion and dental problem.    Eyes: Negative for pain.   Respiratory: Negative for apnea and chest tightness.    Cardiovascular: Negative for chest pain and leg swelling.   Gastrointestinal: Negative for abdominal distention.   Endocrine: Negative for polydipsia.   Genitourinary: Negative for difficulty urinating and dysuria.   Psychiatric/Behavioral: The patient is nervous/anxious.      Objective:     Vital Signs (Most Recent):  Temp: 98.1 °F (36.7 °C) (09/18/19 0746)  Pulse: 77 (09/18/19 0746)  Resp: 18 (09/18/19 0746)  BP: 135/88 (09/18/19 0746)  SpO2: 95 % (09/18/19 0746) Vital Signs (24h Range):  Temp:  [98.1 °F (36.7 °C)-98.6 °F (37 °C)] 98.1 °F (36.7 °C)  Pulse:  [66-86] 77  Resp:  [15-19] 18  SpO2:  [95 %-100 %] 95 %  BP: (100-136)/(64-88) 135/88     Weight: 86 kg (189 lb 9.5 oz)  Body mass index is 29.69 kg/m².    Intake/Output Summary (Last 24 hours) at 9/18/2019 0751  Last data filed at 9/17/2019 0900  Gross per 24 hour   Intake --   Output 100 ml   Net -100 ml      Physical Exam   Constitutional: He is oriented to person, place, and time. No distress.   HENT:   Head: Atraumatic.   Eyes: EOM are normal.   Neck: Neck supple.   Cardiovascular: Normal rate and regular rhythm.   Pulmonary/Chest: Effort normal and breath sounds normal.   Abdominal: Soft.   Musculoskeletal: Normal range of motion. He exhibits no edema.   Neurological: He is oriented to person, place, and time. No cranial nerve deficit. Coordination normal.   Skin: Skin is warm and dry.       Significant Labs:   CBC:   Recent Labs   Lab 09/16/19  0923 09/16/19  0949 09/17/19  0450 09/18/19  0302   WBC 7.28  --  8.64 8.74   HGB 16.4  --  14.3 15.0   HCT 46.8 46 42.3 46.0     --  242 266     CMP:   Recent Labs   Lab 09/16/19  0923  09/17/19  0450 09/17/19  0647 09/18/19  0302       < > 139 141 140   K 3.9   < > 3.8 3.7 4.2      < > 107 108 110   CO2 23   < > 24 27 24   *   < > 95 103 86   BUN 17   < > 15 14 18   CREATININE 1.0   < > 1.0 1.0 1.1   CALCIUM 9.0   < > 7.9* 7.8* 8.7   PROT 7.0  --   --   --  6.2   ALBUMIN 3.8  --   --   --  3.3*   BILITOT 0.4  --   --   --  0.5   ALKPHOS 71  --   --   --  59   AST 18  --   --   --  17   ALT 18  --   --   --  25   ANIONGAP 11   < > 8 6* 6*   EGFRNONAA >60   < > >60 >60 >60    < > = values in this interval not displayed.       Significant Imaging: reviewed.

## 2019-09-18 NOTE — DISCHARGE SUMMARY
[facility] Hospital Medicine  Discharge Summary      Patient Name: Donell Park  MRN: [id number]  Admission Date: 9/16/2019  Hospital Length of Stay: 2 days  Discharge Date and Time:  09/18/2019 12:29 PM  Attending Physician: Paul Chavez MD   Discharging Provider: Paul Chavez MD  Primary Care Provider: Yenny Resendiz MD      HPI:   43 y.o. male with a PMHx of ADHD, Depression, and Anxiety who presents to the Emergency Department with a cc of suicide attempt that happened 1 hour prior to arrival. Pt reports taking 200 pills of aspirin and Seroquel but is unsure how many. Pt's girlfriend reports pt left a note for her and she went looking for him. She also reports pt has not been able to spend time with his kids as much as he would like. Pt denies pain, nausea, vomiting, or any further associated symptoms. Pt's girlfriend reports he has tried suicide in the past. No known drug allergies    Pt PEC in ED. GIven charcoal and started sodium bicarbonate infusion. No severe EKG or electrolyte abnormalities. Transferred to ICU for close monitoring.  Pt reports this is his 4 suicide attempt. When asked what medications he takes he could not name them but later in conversation he named all of them. He reports ringing in the ears. Salicylate level is 45. Drug screen negative. Girlfriend present.     * No surgery found *      Hospital Course:   Patient admitted to ICU for salicylate overload secondary to intentional suicide attempted. Poison control contracted. Started on IV bicarb, electrolyte replacement while monitored closely in the ICU. Psych consulted. He was stable overall. Salicylate level improved . D/c IV bicarb and salicylate lvls checks. Stable for telemetry with PEC. He was still suicidal but not homicidal.  At this time denies SI,psychitary recommending inpatient psych . Placement,consulted SW.  Patient has rosy transferred to inpatient psychiatry facility.      Consults:   Consults (From admission, onward)        Status Ordering Provider     Inpatient consult to Psychiatry  Once     Provider:  Clyde Hall MD    Completed HUY ALMONTE     Inpatient consult to Social Work  Once     Provider:  (Not yet assigned)    Acknowledged BRAYAN SPANGLER consult to case management  Once     Provider:  (Not yet assigned)    Acknowledged BROOKS GARCIA          No new Assessment & Plan notes have been filed under this hospital service since the last note was generated.  Service: Hospital Medicine    Final Active Diagnoses:    Diagnosis Date Noted POA    PRINCIPAL PROBLEM:  Suicide attempt by drug ingestion [T50.902A] 09/16/2019 Yes    Tinnitus of both ears [H93.13] 09/16/2019 Yes    Severe episode of recurrent major depressive disorder, with psychotic features [F33.3] 06/25/2019 Yes    Anxiety [F41.9] 06/03/2019 Yes      Problems Resolved During this Admission:       Discharged Condition: stable    Disposition: Home or Self Care    Follow Up:    Patient Instructions:   No discharge procedures on file.    Significant Diagnostic Studies: Labs:   CMP   Recent Labs   Lab 09/17/19  0450 09/17/19  0647 09/18/19  0302    141 140   K 3.8 3.7 4.2    108 110   CO2 24 27 24   GLU 95 103 86   BUN 15 14 18   CREATININE 1.0 1.0 1.1   CALCIUM 7.9* 7.8* 8.7   PROT  --   --  6.2   ALBUMIN  --   --  3.3*   BILITOT  --   --  0.5   ALKPHOS  --   --  59   AST  --   --  17   ALT  --   --  25   ANIONGAP 8 6* 6*   ESTGFRAFRICA >60 >60 >60   EGFRNONAA >60 >60 >60    and CBC   Recent Labs   Lab 09/17/19  0450 09/18/19  0302   WBC 8.64 8.74   HGB 14.3 15.0   HCT 42.3 46.0    266     Radiology: X-Ray: CXR: X-Ray Chest 1 View (CXR):   Results for orders placed or performed during the hospital encounter of 09/16/19   X-Ray Chest 1 View    Narrative    EXAMINATION:  XR CHEST 1 VIEW    CLINICAL HISTORY:  Poisoning by aspirin, accidental (unintentional), initial  encounter    TECHNIQUE:  Single frontal view of the chest was performed.    COMPARISON:  Chest 11/11/2018    FINDINGS:  Heart size is within normal limits.  Mediastinum is unremarkable.  Low lung volumes when compared to the previous study.  There are no acute lobar consolidations or pneumothorax or pulmonary vascular congestion or pleural effusions.  There are cardiac monitoring leads over the chest.      Impression    No acute cardiopulmonary process.      Electronically signed by: Bony Flaherty MD  Date:    09/16/2019  Time:    10:13    and X-Ray Chest PA and Lateral (CXR): No results found for this visit on 09/16/19.    Pending Diagnostic Studies:     None         Medications:  Reconciled Home Medications:      Medication List      CONTINUE taking these medications    mirtazapine 30 MG tablet  Commonly known as:  REMERON  Take 1 tablet (30 mg total) by mouth every evening.     sertraline 25 MG tablet  Commonly known as:  ZOLOFT  Take 1 tablet (25 mg total) by mouth once daily.        STOP taking these medications    ARIPiprazole 10 MG Tab  Commonly known as:  ABILIFY     QUEtiapine 200 MG Tab  Commonly known as:  SEROQUEL            Indwelling Lines/Drains at time of discharge:   Lines/Drains/Airways          None          Time spent on the discharge of patient: over 30  minutes  Patient was seen and examined on the date of discharge and determined to be suitable for discharge.         Paul Chavez MD  Department of Hospital Medicine  [facility]

## 2019-09-18 NOTE — PLAN OF CARE
09/18/19 1301   Final Note   Assessment Type Final Discharge Note   Anticipated Discharge Disposition Psych   What phone number can be called within the next 1-3 days to see how you are doing after discharge? [phone]   Right Care Referral Info   Post Acute Recommendation Other  (Inpatient psych)   Referral Type Psych   Facility Name [address] [address]

## 2019-09-18 NOTE — PLAN OF CARE
09/18/19 1303   Post-Acute Status   Post-Acute Authorization Placement  (Inpatient psych)   Post-Acute Placement Status Set-up Complete   Discharge Delays (!) Other  (Waiting on transportation)

## 2019-09-18 NOTE — NURSING
Wheelchair van assistant tried to call Marilyn WHITFIELD but she did not answer and phone call was transferred to me. She asked questions and I answered. Pt is a two person assist because he is PEC/CEC not because he cannot walk and no oxygen required.

## 2019-09-18 NOTE — NURSING
Bedside shift report received from AMY Ramesh. Communication board has been updated. NAD noted. Will continue to monitor. Pt is PEC/CEC. Simin is sitting with the pt.

## 2019-09-18 NOTE — ASSESSMENT & PLAN NOTE
PEC  1:1 sitter  + SI   Psychiatry consulted  medically is clear for placement,At this time denies SI,psychitary recommending inpatient psych . Placement,consulted NIKOLE KAN

## 2019-09-18 NOTE — NURSING
Bedside report given to night nurse. Pt has no sign of distress. Safety sitter at bedside. Chart check completed.

## 2019-09-18 NOTE — PROGRESS NOTES
[facility] Hospital Medicine  Progress Note    Patient Name: Donell Park  MRN: [id number]  Patient Class: IP- Inpatient   Admission Date: 9/16/2019  Length of Stay: 2 days  Attending Physician: Paul Chavez MD  Primary Care Provider: Yenny Resendiz MD        Subjective:     Principal Problem:Suicide attempt by drug ingestion        HPI:  43 y.o. male with a PMHx of ADHD, Depression, and Anxiety who presents to the Emergency Department with a cc of suicide attempt that happened 1 hour prior to arrival. Pt reports taking 200 pills of aspirin and Seroquel but is unsure how many. Pt's girlfriend reports pt left a note for her and she went looking for him. She also reports pt has not been able to spend time with his kids as much as he would like. Pt denies pain, nausea, vomiting, or any further associated symptoms. Pt's girlfriend reports he has tried suicide in the past. No known drug allergies    Pt PEC in ED. GIven charcoal and started sodium bicarbonate infusion. No severe EKG or electrolyte abnormalities. Transferred to ICU for close monitoring.  Pt reports this is his 4 suicide attempt. When asked what medications he takes he could not name them but later in conversation he named all of them. He reports ringing in the ears. Salicylate level is 45. Drug screen negative. Girlfriend present.     Overview/Hospital Course:  Patient admitted to ICU for salicylate overload secondary to intentional suicide attempted. Poison control contracted. Started on IV bicarb, electrolyte replacement while monitored closely in the ICU. Psych consulted. He was stable overall. Salicylate lvls now low. D/c IV bicarb and salicylate lvls checks. Stable for telemetry with PEC. He was still suicidal but not homicidal.  At this time denies SI,psychitary recommending inpatient psych . Placement,consulted SW.    Interval History: denies SI at this time.    Review of Systems   Constitutional: Negative for  activity change and appetite change.   HENT: Negative for congestion and dental problem.    Eyes: Negative for pain.   Respiratory: Negative for apnea and chest tightness.    Cardiovascular: Negative for chest pain and leg swelling.   Gastrointestinal: Negative for abdominal distention.   Endocrine: Negative for polydipsia.   Genitourinary: Negative for difficulty urinating and dysuria.   Psychiatric/Behavioral: The patient is nervous/anxious.      Objective:     Vital Signs (Most Recent):  Temp: 98.1 °F (36.7 °C) (09/18/19 0746)  Pulse: 77 (09/18/19 0746)  Resp: 18 (09/18/19 0746)  BP: 135/88 (09/18/19 0746)  SpO2: 95 % (09/18/19 0746) Vital Signs (24h Range):  Temp:  [98.1 °F (36.7 °C)-98.6 °F (37 °C)] 98.1 °F (36.7 °C)  Pulse:  [66-86] 77  Resp:  [15-19] 18  SpO2:  [95 %-100 %] 95 %  BP: (100-136)/(64-88) 135/88     Weight: 86 kg (189 lb 9.5 oz)  Body mass index is 29.69 kg/m².    Intake/Output Summary (Last 24 hours) at 9/18/2019 0751  Last data filed at 9/17/2019 0900  Gross per 24 hour   Intake --   Output 100 ml   Net -100 ml      Physical Exam   Constitutional: He is oriented to person, place, and time. No distress.   HENT:   Head: Atraumatic.   Eyes: EOM are normal.   Neck: Neck supple.   Cardiovascular: Normal rate and regular rhythm.   Pulmonary/Chest: Effort normal and breath sounds normal.   Abdominal: Soft.   Musculoskeletal: Normal range of motion. He exhibits no edema.   Neurological: He is oriented to person, place, and time. No cranial nerve deficit. Coordination normal.   Skin: Skin is warm and dry.       Significant Labs:   CBC:   Recent Labs   Lab 09/16/19  0923 09/16/19  0949 09/17/19  0450 09/18/19  0302   WBC 7.28  --  8.64 8.74   HGB 16.4  --  14.3 15.0   HCT 46.8 46 42.3 46.0     --  242 266     CMP:   Recent Labs   Lab 09/16/19  0923  09/17/19  0450 09/17/19  0647 09/18/19  0302      < > 139 141 140   K 3.9   < > 3.8 3.7 4.2      < > 107 108 110   CO2 23   < > 24 27 24    *   < > 95 103 86   BUN 17   < > 15 14 18   CREATININE 1.0   < > 1.0 1.0 1.1   CALCIUM 9.0   < > 7.9* 7.8* 8.7   PROT 7.0  --   --   --  6.2   ALBUMIN 3.8  --   --   --  3.3*   BILITOT 0.4  --   --   --  0.5   ALKPHOS 71  --   --   --  59   AST 18  --   --   --  17   ALT 18  --   --   --  25   ANIONGAP 11   < > 8 6* 6*   EGFRNONAA >60   < > >60 >60 >60    < > = values in this interval not displayed.       Significant Imaging: reviewed.      Assessment/Plan:      * Suicide attempt by drug ingestion  Aspirin overdose. Poison control contacted.  Bicarboante infusion started, now d/c  Stable for telemetry at this time,  At this time denies SI,psychitary recommending inpatient psych . Placement,consulted SW.    Tinnitus of both ears  Secondary to aspirin toxicity,appear to be improving.    Severe episode of recurrent major depressive disorder, with psychotic features  PEC  1:1 sitter  + SI   Psychiatry consulted  medically is clear for placement,At this time denies SI,psychitary recommending inpatient psych . Placement,consulted SW.  Zyprexa PRN    Anxiety  PEC  Resumed home SSI and mirtazapine  Holding Seroquel and abilify for now      VTE Risk Mitigation (From admission, onward)        Ordered     enoxaparin injection 40 mg  Daily      09/16/19 1633     IP VTE LOW RISK PATIENT  Once      09/16/19 1529     Place RAMONA hose  Until discontinued      09/16/19 1529                Paul Chavez MD  Department of Hospital Medicine   [facility]

## 2019-09-18 NOTE — PLAN OF CARE
09/18/19 0956   Post-Acute Status   Post-Acute Authorization Placement   Post-Acute Placement Status Referrals Sent   Discharge Delays None known at this time     NAHID faxed packet to several psych facilities. NAHID waiting to determine if services will be provided.     12:33PM  Orders received to transport pt to inpatient psych. NAHID faxed orders to [facility] via e-mail. NAHID received call report from NAHID Sethi, accepting doctor: Dr. Andrea Velzáquez and number to call report: [phone]. NAHID contacted pt's Lena harrison, and notified pt will transport to [facility]. Lena verbalized understanding.     12:54PM  ADT 30 order placed for  Transportation.  ETA: 2:30PM  If transportation does not arrive at ETA time nurse will be instructed to follow protocol for transportation below:  How can I get in touch directly with dispatch, if needed?                 Non-emergent dispatch: [phone]                                    Emergent dispatch: [phone]  Non-emergent (stretcher): [phone]  Escalation Needs (PFC Lead): [phone]    NAHID provided pt's nurse, Negra, travel packet, and transport ETA.

## 2019-09-18 NOTE — PLAN OF CARE
[facility]    FACILITY TRANSFER ORDERS  To inpatient psychiatry facility     Patient Name: Donell Park  YOB: 1975    PCP: Yenny Resendiz MD   PCP Address: [address] [address]  PCP Phone Number: [phone]  PCP Fax: [phone]    Encounter Date: 09/18/2019    Admit to: inpatient psychiatry     Vital Signs:  Routine    Diagnoses:   Active Hospital Problems    Diagnosis  POA    *Suicide attempt by drug ingestion [T50.902A]  Yes    Tinnitus of both ears [H93.13]  Yes    Severe episode of recurrent major depressive disorder, with psychotic features [F33.3]  Yes    Anxiety [F41.9]  Yes      Resolved Hospital Problems   No resolved problems to display.       Allergies:Review of patient's allergies indicates:  No Known Allergies    Diet: regular diet    Activities: Activity as tolerated    Nursing: suicide watch,PEC          CONSULTS:    Psychiatry     MISCELLANEOUS CARE:  suicide watch     WOUND CARE ORDERS  None    Medications: Review discharge medications with patient and family and provide education.      Current Discharge Medication List      CONTINUE these medications which have NOT CHANGED    Details   mirtazapine (REMERON) 30 MG tablet Take 1 tablet (30 mg total) by mouth every evening.  Qty: 30 tablet, Refills: 0    Associated Diagnoses: Severe recurrent major depressive disorder with psychotic features with anxious distress; Mood insomnia      sertraline (ZOLOFT) 25 MG tablet Take 1 tablet (25 mg total) by mouth once daily.  Qty: 30 tablet, Refills: 0    Associated Diagnoses: Severe recurrent major depressive disorder with psychotic features with anxious distress         STOP taking these medications       ARIPiprazole (ABILIFY) 10 MG Tab Comments:   Reason for Stopping:         QUEtiapine (SEROQUEL) 200 MG Tab Comments:   Reason for Stopping:                    _________________________________  Paul Chavez  MD  09/18/2019

## 2019-09-19 PROBLEM — K29.50 CHRONIC GASTRITIS: Status: ACTIVE | Noted: 2019-09-19

## 2019-09-19 PROBLEM — F17.200 NICOTINE USE DISORDER: Status: ACTIVE | Noted: 2019-09-19

## 2019-10-01 ENCOUNTER — PATIENT OUTREACH (OUTPATIENT)
Dept: ADMINISTRATIVE | Facility: OTHER | Age: 44
End: 2019-10-01

## 2019-10-03 ENCOUNTER — OFFICE VISIT (OUTPATIENT)
Dept: PSYCHIATRY | Facility: CLINIC | Age: 44
End: 2019-10-03
Payer: OTHER GOVERNMENT

## 2019-10-03 VITALS
SYSTOLIC BLOOD PRESSURE: 110 MMHG | DIASTOLIC BLOOD PRESSURE: 66 MMHG | WEIGHT: 196 LBS | HEART RATE: 102 BPM | BODY MASS INDEX: 30.76 KG/M2 | HEIGHT: 67 IN

## 2019-10-03 DIAGNOSIS — F39 MOOD INSOMNIA: ICD-10-CM

## 2019-10-03 DIAGNOSIS — F51.05 MOOD INSOMNIA: ICD-10-CM

## 2019-10-03 DIAGNOSIS — F33.41: Primary | ICD-10-CM

## 2019-10-03 DIAGNOSIS — F51.5 NIGHTMARES: ICD-10-CM

## 2019-10-03 DIAGNOSIS — F12.20 ADDICTION, MARIJUANA: ICD-10-CM

## 2019-10-03 DIAGNOSIS — F41.1 GENERALIZED ANXIETY DISORDER: ICD-10-CM

## 2019-10-03 PROCEDURE — 99999 PR PBB SHADOW E&M-EST. PATIENT-LVL III: CPT | Mod: PBBFAC,,, | Performed by: NURSE PRACTITIONER

## 2019-10-03 PROCEDURE — 99214 OFFICE O/P EST MOD 30 MIN: CPT | Mod: S$PBB,,, | Performed by: NURSE PRACTITIONER

## 2019-10-03 PROCEDURE — 99999 PR PBB SHADOW E&M-EST. PATIENT-LVL III: ICD-10-PCS | Mod: PBBFAC,,, | Performed by: NURSE PRACTITIONER

## 2019-10-03 PROCEDURE — 90833 PR PSYCHOTHERAPY W/PATIENT W/E&M, 30 MIN (ADD ON): ICD-10-PCS | Mod: ,,, | Performed by: NURSE PRACTITIONER

## 2019-10-03 PROCEDURE — 99214 PR OFFICE/OUTPT VISIT, EST, LEVL IV, 30-39 MIN: ICD-10-PCS | Mod: S$PBB,,, | Performed by: NURSE PRACTITIONER

## 2019-10-03 PROCEDURE — 99213 OFFICE O/P EST LOW 20 MIN: CPT | Mod: PBBFAC | Performed by: NURSE PRACTITIONER

## 2019-10-03 PROCEDURE — 90833 PSYTX W PT W E/M 30 MIN: CPT | Mod: ,,, | Performed by: NURSE PRACTITIONER

## 2019-10-03 NOTE — PROGRESS NOTES
Outpatient Psychiatry Follow-Up Visit (MD/NP)    10/3/2019    Clinical Status of Patient:  Outpatient (Ambulatory)    Chief Complaint:  Donell Park is a 43 y.o. male who presents today for follow-up of depression, anxiety, psychosis and poor sleep, marijuana dependence and drug seeking behavior.  Met with patient.      Interval History and Content of Current Session:  Interim Events/Subjective Report/Content of Current Session: Donell  was last seen by me on 09/05/2019 for a follow up visit. Donell was diagnosed with Severe recurrent major depressive disorder with psychotic features with anxious distress , mood insomnia, marijuana dependence and drug seeking behavior and a plan of care was devised to include:    1. Safety: Call 911 or Crisis Line or go to ER for suicidal ideation, adverse effects of medication or any other emergency  2. Start psychotherapy to help develop skills to help with concentration and focus. Missed scheduled evaluation appt. And will reschedule  3. Return to clinic in one month or sooner prn.   4. Zoloft 25 mg po qd.  5. Remeron 30 mg po qhs.  6. Taper off of Quetiapine 200 mg: Take 1/2 a tablet a night for 7 nights then take 1/2 tab every other night for 7 doses then stop  7. Abilify 10 mg po qd.  8. Vistaril 25 mg po qd prn anxiety.    Today Donell is seen face to face. Since his last visit he went to [facility] on 09/18/2019 after an attempted suicide with an overdose of Seroquel and ASA. He was discharged on 09/24/2019 with prescriptions for Prazosin 1 mg po qhs, Abilify 10 mg po qd,  Trazodone 50 mg po qhs, sertraline 50 mg po qd and sucralfate tablets 1 gm QID with mals and at bedtime. His depression is under control with the medication but he is down about not having a job. He was denied the job because he was taking Seroquel. He states that even with his tapering off of the Seroquel, he was still denied the job. He states he wants to get disability  now so he can go to therapy 2-3 times a week. He is referred to [facility] for a disability assessment. He is also advised regarding [facility] and given a pamphlet and instructed he can call this unit and they can look into his insurance and go over the program in detail. Verbalizes understanding and plan to do so.     He reports that his psychotic features are gone. His anxiety is under control but he had one day where he was anxious and he took an extra Zoloft tablet. He is re-instructed on the delayed effects of Zoloft and that it is not for acute anxiety. He admits that he is using marijuana again at about twice a week. Although later in the interview when asked when he last used it, he states 2 weeks ago. Re-instructed that it is unknown how marijuana mixes with his medications and can be adverse up to and including death. Verbalizes understanding. His sleep is good poor. His appetite is excessive. He has gained 15 pounds. He states he is eating everything to include a lot of snacks and high fat foods and he is eating out a lot. Instructed on diet. His energy level is good. His sleep is poor.     What is scheduled as a 30 minute visit actually takes 44 minutes with greater than 50% of time spent in psychotherapy    Psychotherapy:  · Target symptoms: depression, anxiety , psychosis, poor sleep, marijuana dependence and drug seeking behavior  · Why chosen therapy is appropriate versus another modality: relevant to diagnosis, evidence based practice  · Outcome monitoring methods: self-report, observation  · Therapeutic intervention type: supportive psychotherapy  · Topics discussed/themes: symptom improvement, work, disability, diet/exercise  · The patient's response to the intervention is accepting. The patient's progress toward treatment goals is good.  · Duration of intervention: 26 minutes.    Review of Systems   PSYCHIATRIC: Pertinant items are noted in the narrative.  GENERAL:  Well  "developed   SKIN:  No rashes or lacerations, tatoos to UEs bilaterally and to right lower extremity  HEAD:  No headache  EYES:  No exophthalmos, jaundice or blindness  EARS:  No hearing loss  MOUTH & THROAT:  No dyskinetic movements or obvious goiter  CHEST:  No shortness of breath  CARDIOVASCULAR:  No chest pain  ABDOMEN:  No nausea, vomiting, pain, constipation or diarrhea  URINARY:  No dysuria, low libido  MUSCULOSKELETAL:  No tremor, no tic  NEUROLOGIC:  No abnormal movements    Past Medical, Family and Social History: The patient's past medical, family and social history have been reviewed and updated as appropriate within the electronic medical record - see encounter notes.    Compliance: partial    Side effects: None    Risk Parameters:  Patient reports no suicidal ideation  Patient reports no homicidal ideation  Patient reports no self-injurious behavior  Patient reports no violent behavior    Exam (detailed: at least 9 elements; comprehensive: all 15 elements)   Constitutional  Vitals:  Most recent vital signs, dated less than 90 days prior to this appointment, were reviewed.   Vitals:    10/03/19 0732   BP: 110/66   Pulse: 102   Weight: 88.9 kg (195 lb 15.8 oz)   Height: 5' 7" (1.702 m)        General:  unremarkable, age appropriate     Musculoskeletal  Muscle Strength/Tone:  no tremor, no tic   Gait & Station:  non-ataxic     Psychiatric  Speech:  no latency; no press   Mood & Affect:  "okay."  congruent and appropriate   Thought Process:  normal and logical   Associations:  intact   Thought Content:  normal, no suicidality, no homicidality, delusions, or paranoia   Insight:  has awareness of illness   Judgement: limited in relation to coping   Orientation:  grossly intact   Memory: intact for content of interview   Language: grossly intact   Attention Span & Concentration:  able to focus   Fund of Knowledge:  intact and appropriate to age and level of education     Assessment and Diagnosis "   Status/Progress: Based on the examination today, the patient's problem(s) is/are improved.  New problems have not been presented today.   Poor coping skills are complicating management of the primary condition.  There are no active rule-out diagnoses for this patient at this time.     General Impression: He is having some problems with sleep but has improved since his hospitalization.       ICD-10-CM ICD-9-CM   1. Major depressive disorder, recurrent episode, in partial remission with mood-congruent psychotic features F33.41 296.35   2. Generalized anxiety disorder F41.1 300.02   3. Mood insomnia F51.05 [id number]    F39    4. Addiction, marijuana F12.20 304.30   5. Nightmares F51.5 307.47       Intervention/Counseling/Treatment Plan   · Medication Management: The risks and benefits of medication were discussed with the patient.  · The treatment plan and follow up plan were reviewed with the patient.   1. Safety: Call 911 or Crisis Line or go to ER for suicidal ideation, adverse effects of medication or any other emergency  2. Start psychotherapy to help develop skills to help with concentration and focus. Missed scheduled evaluation appt. and will reschedule  3. Return to clinic in 3 weeks or sooner prn.   4. Continue Zoloft 50 mg po qd as increased in the hospital.  5. Continue Prazosin 1 mg po qhs for nightmares as started in the hospital.   6. Continue Trazodone 50 mg po qhs prn sleep as started in the hospital.   7. Continue Abilify 10 mg po qd until next scheduled visit.   8. Continue Prazosin 1 mg po qhs for nightmares as started in the hospital.   9. Start Abilify Maintena at 400 mg IM q month on 10/23/2019    Patient agrees with POC.     INSTRUCTIONS  Instructed to call 911 or Crisis Line or go to ER for suicidal ideation, adverse effects of medication or any other emergency. Verbalizes understanding and plan to comply.    Re-instructed on the delayed effects of Zoloft and that it is not for acute anxiety.  Verbalizes understanding.     Re-instructed that it is unknown how marijuana mixes with his medications and can be adverse up to and including death. Verbalizes understanding.     Instructed on healthy, nutritious, portion-sized diet with emphasis on fresh fruit and veggies for snacks and more veggies on plate than starches and high fiber and water content in fruit and veggies and how body works to digest fresh foods vs processed foods. Verbalizes understanding and plan to comply.    Return to Clinic: 3 weeks, as needed

## 2019-10-03 NOTE — PATIENT INSTRUCTIONS
"You have been provided with a certain amount of medication with a specified number of refills.  Please follow up within an adequate time before you run out of medications.    REFILLS FOR CONTROLLED SUBSTANCES WILL NOT BE GIVEN WITHOUT AN APPOINTMENT.  I will not honor or fill automated refill requests from pharmacies.  You must come in for an appointment to get refills.    Please book your next appointment for myself or therapist by phone by calling our office at [phone].      PLEASE BE AT LEAST 15 MINUTES EARLY FOR YOUR NEXT APPOINTMENT.  PLEASE, DO NOT BE LATE OR YOU WILL BE TURNED AWAY AND ASKED TO RESCHEDULE.  YOU MUST COME EARLY TO ALLOW TIME FOR CHECK-IN AS WELL AS GET YOUR VITAL SIGNS AND GO OVER YOUR MEDICATIONS.  [facility] is not fair to the patients who present after you and are on time for their appointments.  It causes a delay in the appointments for patients and staff.  IF YOU ARE LATE, THERE IS A POSSIBILITY THAT YOU WILL BE CHARGED FOR THE APPOINTMENT TIME PERSONALLY AND IT WILL NOT GO TO YOUR INSURANCE.  YOU MAY ALSO BE DISCHARGED FROM CLINIC with multiple "No Show" appointments.  -----------------------------------------------------------------------------------------------------------------  IF YOU FEEL SUICIDAL OR HAVING THOUGHTS OR PLANS TO HURT YOURSELF OR OTHERS, CALL 911 OR REPORT TO THE NEAREST EMERGENCY ROOM.  YOU CAN ALSO ACCESS THE FOLLOWING HOTLINE(S):    National Suicide Hotline Number 1-160-760-TALK (5268)     ([facility] Mobile Crisis, [phone]'   [place] [facility] Crisis Line, (783) 842-8126; [facility], 24 hours / 7 days, (767) 989-COPE (6690), 8-885-420-COPE (5435))     TIPS FOR GETTING YOUR PRESCRIPTIONS:    You can always ask your pharmacist the cost of your medications without the use of your insurance. Sometimes the medication will be cheaper if you do not use your insurance.     If you decide you want to have your prescriptions filled at " a different pharmacy, you can always go to the new pharmacy of your choice and have them call the pharmacy where your prescription was sent and they can have your prescription transferred to the new pharmacy.

## 2019-10-18 NOTE — PROGRESS NOTES
Outpatient Psychiatry Follow-Up Visit (MD/NP)    10/22/2019    Clinical Status of Patient:  Outpatient (Ambulatory)    Chief Complaint:  Donell Park is a 44 y.o. male who presents today for follow-up of depression, anxiety, psychosis and poor sleep, marijuana dependence and drug seeking behavior.  Met with patient.      Interval History and Content of Current Session:  Interim Events/Subjective Report/Content of Current Session: Donell  was last seen by me on 10/03/2019 for a follow up visit. Donell was diagnosed with Severe recurrent major depressive disorder with psychotic features with anxious distress , mood insomnia, marijuana dependence and drug seeking behavior. He had improved but was having some problems with sleep. A plan of care was devised to include:    1. Safety: Call 911 or Crisis Line or go to ER for suicidal ideation, adverse effects of medication or any other emergency  2. Start psychotherapy to help develop skills to help with concentration and focus. Missed scheduled evaluation appt. and will reschedule  3. Return to clinic in 3 weeks or sooner prn.   4. Continue Zoloft 50 mg po qd as increased in the hospital.  5. Continue Prazosin 1 mg po qhs for nightmares as started in the hospital.   6. Continue Trazodone 50 mg po qhs prn sleep as started in the hospital.   7. Continue Abilify 10 mg po qd until next scheduled visit.   8. Continue Prazosin 1 mg po qhs for nightmares as started in the hospital.   9. Start Abilify Maintena at 400 mg IM q month on 10/23/2019    Today Donell is seen face to face. His depression is under control. His psychotic features are gone. His anxiety is under control. He states that his substance use has stopped for the last two weeks. His sleep is good when he takes two of the Trazodone tablets. His appetite is good. His energy level is high. He has been watching his diet and has started going to the gym. At the end of the visit, he asks that his girlfriend  comes into the interview room so that this practitioner can meet her. Brittney comes into the room. She states that he is doing better. Instructed on smoking cessation program and the benefits of not smoking to include better results from medications. Verbalizes understanding and refuses.     Psychotherapy:  · Target symptoms: depression, anxiety , substance abuse, psychosis  · Why chosen therapy is appropriate versus another modality: relevant to diagnosis, evidence based practice  · Outcome monitoring methods: self-report, observation  · Therapeutic intervention type: supportive psychotherapy  · Topics discussed/themes: symptom improvement, dangers of doubling medications with emphasis on health of liver, potential seizures and death. Verbalizes understanding,. diet/exercise  · The patient's response to the intervention is accepting. The patient's progress toward treatment goals is good.  · Duration of intervention: 17 minutes.    Review of Systems   PSYCHIATRIC: Pertinant items are noted in the narrative.  GENERAL:  Well developed   SKIN:  No rashes or lacerations, tatoos to UEs bilaterally and to right lower extremity  HEAD:  No headache  EYES:  No exophthalmos, jaundice or blindness  EARS:  No hearing loss  MOUTH & THROAT:  No dyskinetic movements or obvious goiter  CHEST:  No shortness of breath  CARDIOVASCULAR:  No chest pain  ABDOMEN:  No nausea, vomiting, pain, constipation or diarrhea  URINARY:  No dysuria, low libido  MUSCULOSKELETAL:  No tremor, no tic  NEUROLOGIC:  No abnormal movements    Past Medical, Family and Social History: The patient's past medical, family and social history have been reviewed and updated as appropriate within the electronic medical record - see encounter notes.    Compliance: partial    Side effects: None    Risk Parameters:  Patient reports no suicidal ideation  Patient reports no homicidal ideation  Patient reports no self-injurious behavior  Patient reports no violent  "behavior    Exam (detailed: at least 9 elements; comprehensive: all 15 elements)   Constitutional  Vitals:  Most recent vital signs, dated less than 90 days prior to this appointment, were reviewed.   Vitals:    10/22/19 0724   BP: 108/64   Pulse: 76   Weight: 90.2 kg (198 lb 11.9 oz)   Height: 5' 7" (1.702 m)        General:  unremarkable, age appropriate     Musculoskeletal  Muscle Strength/Tone:  no tremor, no tic   Gait & Station:  non-ataxic     Psychiatric  Speech:  no latency; no press   Mood & Affect:  "better."  mildly restricted   Thought Process:  normal and logical   Associations:  intact   Thought Content:  normal, no suicidality, no homicidality, delusions, or paranoia   Insight:  has awareness of illness   Judgement: limited in relation to coping   Orientation:  grossly intact   Memory: intact for content of interview   Language: grossly intact   Attention Span & Concentration:  able to focus   Fund of Knowledge:  intact and appropriate to age and level of education     Assessment and Diagnosis   Status/Progress: Based on the examination today, the patient's problem(s) is/are improved.  New problems have not been presented today.   Poor coping skills are complicating management of the primary condition.  There are no active rule-out diagnoses for this patient at this time.     General Impression: He has improved. He however has problems with sleep, even on the 50 mg dose of Trazodone.  Diagnosis today: Severe recurrent major depressive disorder with psychotic features with anxious distress, mood insomnia, nightmares, ADRI, and nicotine use disorder.         ICD-10-CM ICD-9-CM   1. Severe recurrent major depressive disorder with psychotic features with anxious distress F33.3 296.34   2. Mood insomnia F51.05 [id number]    F39    3. Nightmares F51.5 307.47   4. Generalized anxiety disorder F41.1 300.02   5. Nicotine use disorder F17.200 305.1       Intervention/Counseling/Treatment Plan   · Medication " Management: The risks and benefits of medication were discussed with the patient.  · The treatment plan and follow up plan were reviewed with the patient.   1. Safety: Call 911 or Crisis Line or go to ER for suicidal ideation, adverse effects of medication or any other emergency  2. Start psychotherapy to help develop skills to help with concentration and focus. Did not reschedule after last visit.  3. Return to clinic in one month or sooner prn.   4. Continue Zoloft 50 mg po qd as increased in the hospital.  5. Continue Prazosin 1 mg po qhs for nightmares as started in the hospital.   6. Increase Trazodone to 100 mg po qhs prn sleep.   7. D/C Abilify 10 mg po qd.   8. Continue Prazosin 1 mg po qhs for nightmares as started in the hospital.   9. Continue Aristada 441 mg IM q month. Taking in infusion center today (10/22/2019).  10. Consider cutting down on smoking.     Patient agrees with POC.    INSTRUCTIONS  Instructed to call 911 or Crisis Line or go to ER for suicidal ideation, adverse effects of medication or any other emergency. Verbalizes understanding and plan to comply.    Return to Clinic: 1 month, as needed

## 2019-10-22 ENCOUNTER — OFFICE VISIT (OUTPATIENT)
Dept: PSYCHIATRY | Facility: CLINIC | Age: 44
End: 2019-10-22
Payer: OTHER GOVERNMENT

## 2019-10-22 ENCOUNTER — INFUSION (OUTPATIENT)
Dept: INFUSION THERAPY | Facility: HOSPITAL | Age: 44
End: 2019-10-22
Attending: NURSE PRACTITIONER
Payer: OTHER GOVERNMENT

## 2019-10-22 VITALS
HEIGHT: 67 IN | DIASTOLIC BLOOD PRESSURE: 64 MMHG | SYSTOLIC BLOOD PRESSURE: 108 MMHG | HEART RATE: 76 BPM | BODY MASS INDEX: 31.19 KG/M2 | WEIGHT: 198.75 LBS

## 2019-10-22 DIAGNOSIS — F51.05 MOOD INSOMNIA: ICD-10-CM

## 2019-10-22 DIAGNOSIS — F51.5 NIGHTMARES: ICD-10-CM

## 2019-10-22 DIAGNOSIS — R45.851 DEPRESSION WITH SUICIDAL IDEATION: Primary | ICD-10-CM

## 2019-10-22 DIAGNOSIS — F33.3 SEVERE RECURRENT MAJOR DEPRESSIVE DISORDER WITH PSYCHOTIC FEATURES WITH ANXIOUS DISTRESS: Primary | ICD-10-CM

## 2019-10-22 DIAGNOSIS — F39 MOOD INSOMNIA: ICD-10-CM

## 2019-10-22 DIAGNOSIS — F41.1 GENERALIZED ANXIETY DISORDER: ICD-10-CM

## 2019-10-22 DIAGNOSIS — F17.200 NICOTINE USE DISORDER: ICD-10-CM

## 2019-10-22 DIAGNOSIS — F32.A DEPRESSION WITH SUICIDAL IDEATION: Primary | ICD-10-CM

## 2019-10-22 PROCEDURE — 99999 PR PBB SHADOW E&M-EST. PATIENT-LVL III: ICD-10-PCS | Mod: PBBFAC,,, | Performed by: NURSE PRACTITIONER

## 2019-10-22 PROCEDURE — 99999 PR PBB SHADOW E&M-EST. PATIENT-LVL III: CPT | Mod: PBBFAC,,, | Performed by: NURSE PRACTITIONER

## 2019-10-22 PROCEDURE — 99214 OFFICE O/P EST MOD 30 MIN: CPT | Mod: S$PBB,,, | Performed by: NURSE PRACTITIONER

## 2019-10-22 PROCEDURE — 96372 THER/PROPH/DIAG INJ SC/IM: CPT

## 2019-10-22 PROCEDURE — 99214 PR OFFICE/OUTPT VISIT, EST, LEVL IV, 30-39 MIN: ICD-10-PCS | Mod: S$PBB,,, | Performed by: NURSE PRACTITIONER

## 2019-10-22 PROCEDURE — 63600175 PHARM REV CODE 636 W HCPCS: Mod: JG | Performed by: NURSE PRACTITIONER

## 2019-10-22 PROCEDURE — 99213 OFFICE O/P EST LOW 20 MIN: CPT | Mod: PBBFAC,25 | Performed by: NURSE PRACTITIONER

## 2019-10-22 RX ORDER — SERTRALINE HYDROCHLORIDE 50 MG/1
50 TABLET, FILM COATED ORAL DAILY
Qty: 30 TABLET | Refills: 0 | Status: SHIPPED | OUTPATIENT
Start: 2019-10-22 | End: 2019-10-22 | Stop reason: SDUPTHER

## 2019-10-22 RX ORDER — TRAZODONE HYDROCHLORIDE 100 MG/1
100 TABLET ORAL NIGHTLY
Qty: 30 TABLET | Refills: 0 | Status: SHIPPED | OUTPATIENT
Start: 2019-10-22 | End: 2019-11-19 | Stop reason: SDUPTHER

## 2019-10-22 RX ORDER — PRAZOSIN HYDROCHLORIDE 1 MG/1
1 CAPSULE ORAL NIGHTLY
Qty: 30 CAPSULE | Refills: 0 | Status: SHIPPED | OUTPATIENT
Start: 2019-10-22 | End: 2019-10-22 | Stop reason: SDUPTHER

## 2019-10-22 RX ORDER — PRAZOSIN HYDROCHLORIDE 1 MG/1
1 CAPSULE ORAL NIGHTLY
Qty: 30 CAPSULE | Refills: 0 | Status: SHIPPED | OUTPATIENT
Start: 2019-10-22 | End: 2019-11-19 | Stop reason: SDUPTHER

## 2019-10-22 RX ORDER — SERTRALINE HYDROCHLORIDE 50 MG/1
50 TABLET, FILM COATED ORAL DAILY
Qty: 30 TABLET | Refills: 0 | Status: SHIPPED | OUTPATIENT
Start: 2019-10-22 | End: 2019-11-19 | Stop reason: SDUPTHER

## 2019-10-22 RX ADMIN — ARIPIPRAZOLE LAUROXIL 441 MG: 441 INJECTION, SUSPENSION, EXTENDED RELEASE INTRAMUSCULAR at 11:10

## 2019-10-22 NOTE — PATIENT INSTRUCTIONS
"You have been provided with a certain amount of medication with a specified number of refills.  Please follow up within an adequate time before you run out of medications.    REFILLS FOR CONTROLLED SUBSTANCES WILL NOT BE GIVEN WITHOUT AN APPOINTMENT.  I will not honor or fill automated refill requests from pharmacies.  You must come in for an appointment to get refills.    Please book your next appointment for myself or therapist by phone by calling our office at [phone].      PLEASE BE AT LEAST 15 MINUTES EARLY FOR YOUR NEXT APPOINTMENT.  PLEASE, DO NOT BE LATE OR YOU WILL BE TURNED AWAY AND ASKED TO RESCHEDULE.  YOU MUST COME EARLY TO ALLOW TIME FOR CHECK-IN AS WELL AS GET YOUR VITAL SIGNS AND GO OVER YOUR MEDICATIONS.  [facility] is not fair to the patients who present after you and are on time for their appointments.  It causes a delay in the appointments for patients and staff.  IF YOU ARE LATE, THERE IS A POSSIBILITY THAT YOU WILL BE CHARGED FOR THE APPOINTMENT TIME PERSONALLY AND IT WILL NOT GO TO YOUR INSURANCE.  YOU MAY ALSO BE DISCHARGED FROM CLINIC with multiple "No Show" appointments.  -----------------------------------------------------------------------------------------------------------------  IF YOU FEEL SUICIDAL OR HAVING THOUGHTS OR PLANS TO HURT YOURSELF OR OTHERS, CALL 911 OR REPORT TO THE NEAREST EMERGENCY ROOM.  YOU CAN ALSO ACCESS THE FOLLOWING HOTLINE(S):    National Suicide Hotline Number 8-934-897-TALK (8460)     ([facility] Mobile Crisis, [phone]'   [place] [facility] Line, [phone]) 678-3005; [place], 24 hours / 7 days, (025) 044-COPE (9199), 4-463-122-COPE (7083))     TIPS FOR GETTING YOUR PRESCRIPTIONS:    You can always ask your pharmacist the cost of your medications without the use of your insurance. Sometimes the medication will be cheaper if you do not use your insurance.     If you decide you want to have your prescriptions filled at " a different pharmacy, you can always go to the new pharmacy of your choice and have them call the pharmacy where your prescription was sent and they can have your prescription transferred to the new pharmacy.

## 2019-10-22 NOTE — PLAN OF CARE
Pt arrived to unit. No complaints voiced. Good appetite. No fatigue. Pt in good mood. Tolerated Aristada. Pt has next appt. Pt ambulated off unit, accompanied by family. No distress noted.

## 2019-11-19 ENCOUNTER — OFFICE VISIT (OUTPATIENT)
Dept: PSYCHIATRY | Facility: CLINIC | Age: 44
End: 2019-11-19
Payer: OTHER GOVERNMENT

## 2019-11-19 ENCOUNTER — INFUSION (OUTPATIENT)
Dept: INFUSION THERAPY | Facility: HOSPITAL | Age: 44
End: 2019-11-19
Attending: NURSE PRACTITIONER
Payer: OTHER GOVERNMENT

## 2019-11-19 VITALS
SYSTOLIC BLOOD PRESSURE: 120 MMHG | RESPIRATION RATE: 17 BRPM | TEMPERATURE: 98 F | DIASTOLIC BLOOD PRESSURE: 75 MMHG | HEART RATE: 75 BPM | OXYGEN SATURATION: 97 %

## 2019-11-19 VITALS
BODY MASS INDEX: 31.66 KG/M2 | WEIGHT: 201.75 LBS | SYSTOLIC BLOOD PRESSURE: 110 MMHG | DIASTOLIC BLOOD PRESSURE: 70 MMHG | HEIGHT: 67 IN | HEART RATE: 84 BPM

## 2019-11-19 DIAGNOSIS — R45.851 DEPRESSION WITH SUICIDAL IDEATION: Primary | ICD-10-CM

## 2019-11-19 DIAGNOSIS — F51.5 NIGHTMARES: ICD-10-CM

## 2019-11-19 DIAGNOSIS — F33.3 SEVERE RECURRENT MAJOR DEPRESSIVE DISORDER WITH PSYCHOTIC FEATURES WITH ANXIOUS DISTRESS: Primary | ICD-10-CM

## 2019-11-19 DIAGNOSIS — F32.A DEPRESSION WITH SUICIDAL IDEATION: Primary | ICD-10-CM

## 2019-11-19 DIAGNOSIS — F51.05 MOOD INSOMNIA: ICD-10-CM

## 2019-11-19 DIAGNOSIS — F41.1 GENERALIZED ANXIETY DISORDER: ICD-10-CM

## 2019-11-19 DIAGNOSIS — F39 MOOD INSOMNIA: ICD-10-CM

## 2019-11-19 PROCEDURE — 63600175 PHARM REV CODE 636 W HCPCS: Mod: JG | Performed by: NURSE PRACTITIONER

## 2019-11-19 PROCEDURE — 99213 PR OFFICE/OUTPT VISIT, EST, LEVL III, 20-29 MIN: ICD-10-PCS | Mod: S$PBB,,, | Performed by: NURSE PRACTITIONER

## 2019-11-19 PROCEDURE — 99213 OFFICE O/P EST LOW 20 MIN: CPT | Mod: PBBFAC,25 | Performed by: NURSE PRACTITIONER

## 2019-11-19 PROCEDURE — 96372 THER/PROPH/DIAG INJ SC/IM: CPT

## 2019-11-19 PROCEDURE — 99999 PR PBB SHADOW E&M-EST. PATIENT-LVL III: CPT | Mod: PBBFAC,,, | Performed by: NURSE PRACTITIONER

## 2019-11-19 PROCEDURE — 99999 PR PBB SHADOW E&M-EST. PATIENT-LVL III: ICD-10-PCS | Mod: PBBFAC,,, | Performed by: NURSE PRACTITIONER

## 2019-11-19 PROCEDURE — 99213 OFFICE O/P EST LOW 20 MIN: CPT | Mod: S$PBB,,, | Performed by: NURSE PRACTITIONER

## 2019-11-19 RX ORDER — TRAZODONE HYDROCHLORIDE 100 MG/1
100 TABLET ORAL NIGHTLY
Qty: 90 TABLET | Refills: 0 | Status: SHIPPED | OUTPATIENT
Start: 2019-11-19 | End: 2020-02-21

## 2019-11-19 RX ORDER — PRAZOSIN HYDROCHLORIDE 1 MG/1
1 CAPSULE ORAL NIGHTLY
Qty: 90 CAPSULE | Refills: 0 | Status: SHIPPED | OUTPATIENT
Start: 2019-11-19 | End: 2020-02-21

## 2019-11-19 RX ORDER — SERTRALINE HYDROCHLORIDE 50 MG/1
50 TABLET, FILM COATED ORAL DAILY
Qty: 90 TABLET | Refills: 0 | Status: SHIPPED | OUTPATIENT
Start: 2019-11-19 | End: 2020-02-21

## 2019-11-19 RX ADMIN — ARIPIPRAZOLE LAUROXIL 441 MG: 441 INJECTION, SUSPENSION, EXTENDED RELEASE INTRAMUSCULAR at 09:11

## 2019-11-19 NOTE — PROGRESS NOTES
Outpatient Psychiatry Follow-Up Visit (MD/NP)    11/19/2019    Clinical Status of Patient:  Outpatient (Ambulatory)    Chief Complaint:  Donell Park is a 44 y.o. male who presents today for follow-up of depression, anxiety, psychosis and poor sleep, marijuana dependence and drug seeking behavior.  Met with patient.      Interval History and Content of Current Session:  Interim Events/Subjective Report/Content of Current Session: Donell  was last seen by me on 10/22/2019 for a follow up visit. Donell was diagnosed with Severe recurrent major depressive disorder with psychotic features with anxious distress , mood insomnia, nightmares, ADRI and nicotine use disorder. He had improved but was still symptomatic. A plan of care was devised to include:    1. Safety: Call 911 or Crisis Line or go to ER for suicidal ideation, adverse effects of medication or any other emergency  2. Start psychotherapy to help develop skills to help with concentration and focus. Did not reschedule after last visit.  3. Return to clinic in one month or sooner prn.   4. Continue Zoloft 50 mg po qd as increased in the hospital.  5. Continue Prazosin 1 mg po qhs for nightmares as started in the hospital.   6. Increase Trazodone to 100 mg po qhs prn sleep.   7. D/C Abilify 10 mg po qd.   8. Continue Prazosin 1 mg po qhs for nightmares as started in the hospital.   9. Continue Aristada 441 mg IM q month. Taking in infusion center today (10/22/2019).  10. Consider cutting down on smoking.    Today Donell is seen face to face. His depression is under control. His psychotic features are gone. His anxiety is under control. He states that he last used THCA a month ago. His sleep is good and he has not needed the Trazodone for the last 3 days. His appetite is good. His energy level is good. He is going to the gym every other day or so. He does cardio and weights. He admits he is eating a lot of food because he likes food. He wakes up in the  "middle of the night and snacks. He realizes he needs to stop. He agrees to work on not snacking in the middle of the night. He continues with a low libido. He is instructed to follow up with his PCP as medical concerns must be ruled out. He is further advised to request a testosterone level from his PCP and he agrees.He is advised that Zoloft could negatively impact his libido. He verbalizes understanding and states that he does not want to stop the Zoloft even if it is lowering his libido. He states that his nightmares are much better.     He states that since his last visit, he went to [place] to help his dad fix up the house to prepare it for selling. He states he was there 3 weeks and he tolerated his dad and did not tell him anything. However, the day prior to when he was suppose to leave he told his dad how he felt and they had a big argument and he left one day early. He states that he told his dad that it was his fault he tried to kill himself and his fault that he has nightmares. Discussed the importance of taking responsibility for own behaviors. Verbalizes understanding and states that he was mad when he said these things. He states that his dad has substance abuse problems. Discussed the similarities between him and his father. He states there are some but he is not like his dad and does not want to be like his dad. Donell Park minimizes his own substance abuse and states that his dad has a real substance abuse issue and his abuse of THCA is "no big deal".    Psychotherapy:  · Target symptoms: depression, anxiety , substance abuse, psychosis  · Why chosen therapy is appropriate versus another modality: relevant to diagnosis, evidence based practice  · Outcome monitoring methods: self-report, observation  · Therapeutic intervention type: supportive psychotherapy  · Topics discussed/themes: symptom improvement, relationships difficulties, building skills sets for symptom management, substance " "abuse,   · The patient's response to the intervention is accepting with the exception of his THCA use. The patient's progress toward treatment goals is good.  · Duration of intervention: 22 minutes.    Review of Systems   PSYCHIATRIC: Pertinant items are noted in the narrative.  GENERAL:  Well developed   SKIN:  No rashes or lacerations, tatoos to UEs bilaterally and to right lower extremity  HEAD:  No headache  EYES:  No exophthalmos, jaundice or blindness  EARS:  No hearing loss  MOUTH & THROAT:  No dyskinetic movements or obvious goiter  CHEST:  No shortness of breath  CARDIOVASCULAR:  No chest pain  ABDOMEN:  No nausea, vomiting, pain, constipation or diarrhea  URINARY:  No dysuria, low libido  MUSCULOSKELETAL:  No tremor, no tic  NEUROLOGIC:  No abnormal movements    Past Medical, Family and Social History: The patient's past medical, family and social history have been reviewed and updated as appropriate within the electronic medical record - see encounter notes.    Compliance: yes    Side effects: None    Risk Parameters:  Patient reports no suicidal ideation  Patient reports no homicidal ideation  Patient reports no self-injurious behavior  Patient reports no violent behavior    Exam (detailed: at least 9 elements; comprehensive: all 15 elements)   Constitutional  Vitals:  Most recent vital signs, dated less than 90 days prior to this appointment, were reviewed.   Vitals:    11/19/19 0909   BP: 110/70   Pulse: 84   Weight: 91.5 kg (201 lb 11.5 oz)   Height: 5' 7" (1.702 m)        General:  unremarkable, age appropriate     Musculoskeletal  Muscle Strength/Tone:  no tremor, no tic   Gait & Station:  non-ataxic     Psychiatric  Speech:  no latency; no press   Mood & Affect:  "okay"  congruent and appropriate   Thought Process:  normal and logical   Associations:  intact   Thought Content:  normal, no suicidality, no homicidality, delusions, or paranoia   Insight:  has awareness of illness   Judgement: limited " in relation to coping   Orientation:  grossly intact   Memory: intact for content of interview   Language: grossly intact   Attention Span & Concentration:  able to focus   Fund of Knowledge:  intact and appropriate to age and level of education     Assessment and Diagnosis   Status/Progress: Based on the examination today, the patient's problem(s) is/are improved.  New problems have not been presented today.   Poor coping skills are complicating management of the primary condition.  There are no active rule-out diagnoses for this patient at this time.     General Impression: He is doing well but minimizes his substance abuse of THCA.        ICD-10-CM ICD-9-CM   1. Severe recurrent major depressive disorder with psychotic features with anxious distress F33.3 296.34   2. Mood insomnia F51.05 [id number]    F39    3. Generalized anxiety disorder F41.1 300.02   4. Nightmares F51.5 307.47       Intervention/Counseling/Treatment Plan   · Medication Management: The risks and benefits of medication were discussed with the patient.  · The treatment plan and follow up plan were reviewed with the patient.   1. Safety: Call 911 or Crisis Line or go to ER for suicidal ideation, adverse effects of medication or any other emergency  2. Psychotherapy to help develop skills to help with concentration and focus and nightmares related to problems with father.   3. Return to clinic in 3 months or sooner prn.   4. Continue Zoloft 50 mg po qd.  5. Continue Prazosin 1 mg po qhs for nightmares.   6. Continue Trazodone 100 mg po qhs prn sleep.   7. Continue Aristada 441 mg IM q month.   8. Consider cutting down on smoking.     Patient agrees with POC.    INSTRUCTIONS  Instructed to call 911 or Crisis Line or go to ER for suicidal ideation, adverse effects of medication or any other emergency. Verbalizes understanding and plan to comply.    Return to Clinic: 3 months, as needed

## 2019-11-19 NOTE — PLAN OF CARE
Pt presented for Aristada injection. VSS. No complaints/concerns voiced. Tolerated Aristada. Distress screening tool completed. Accompanied by spouse. Future appt information reviewed with pt. Pt ambulated independently off unit. Pt in NAD at time of discharge.

## 2019-12-26 ENCOUNTER — INFUSION (OUTPATIENT)
Dept: INFUSION THERAPY | Facility: HOSPITAL | Age: 44
End: 2019-12-26
Attending: NURSE PRACTITIONER
Payer: OTHER GOVERNMENT

## 2019-12-26 VITALS
OXYGEN SATURATION: 98 % | DIASTOLIC BLOOD PRESSURE: 85 MMHG | RESPIRATION RATE: 17 BRPM | TEMPERATURE: 97 F | SYSTOLIC BLOOD PRESSURE: 123 MMHG | HEART RATE: 91 BPM

## 2019-12-26 DIAGNOSIS — F39 MOOD INSOMNIA: ICD-10-CM

## 2019-12-26 DIAGNOSIS — F32.A DEPRESSION WITH SUICIDAL IDEATION: Primary | ICD-10-CM

## 2019-12-26 DIAGNOSIS — F51.05 MOOD INSOMNIA: ICD-10-CM

## 2019-12-26 DIAGNOSIS — F33.3 SEVERE RECURRENT MAJOR DEPRESSIVE DISORDER WITH PSYCHOTIC FEATURES WITH ANXIOUS DISTRESS: ICD-10-CM

## 2019-12-26 DIAGNOSIS — R45.851 DEPRESSION WITH SUICIDAL IDEATION: Primary | ICD-10-CM

## 2019-12-26 PROCEDURE — 63600175 PHARM REV CODE 636 W HCPCS: Mod: JG | Performed by: NURSE PRACTITIONER

## 2019-12-26 PROCEDURE — 96372 THER/PROPH/DIAG INJ SC/IM: CPT

## 2019-12-26 RX ORDER — TRAZODONE HYDROCHLORIDE 100 MG/1
TABLET ORAL
Qty: 30 TABLET | OUTPATIENT
Start: 2019-12-26

## 2019-12-26 RX ADMIN — ARIPIPRAZOLE LAUROXIL 441 MG: 441 INJECTION, SUSPENSION, EXTENDED RELEASE INTRAMUSCULAR at 09:12

## 2019-12-26 NOTE — PLAN OF CARE
Pt arrived to unit. No complaints voiced. Pt engaging in conversation. Tolerated Aristada to r arm. Pt ambulated off unit. No distress noted.

## 2020-01-06 ENCOUNTER — OFFICE VISIT (OUTPATIENT)
Dept: SURGERY | Facility: CLINIC | Age: 45
End: 2020-01-06
Payer: OTHER GOVERNMENT

## 2020-01-06 ENCOUNTER — HOSPITAL ENCOUNTER (OUTPATIENT)
Facility: HOSPITAL | Age: 45
Discharge: HOME | End: 2020-01-07
Attending: SURGERY | Admitting: SURGERY
Payer: OTHER GOVERNMENT

## 2020-01-06 ENCOUNTER — PATIENT OUTREACH (OUTPATIENT)
Dept: ADMINISTRATIVE | Facility: OTHER | Age: 45
End: 2020-01-06

## 2020-01-06 ENCOUNTER — OFFICE VISIT (OUTPATIENT)
Dept: INTERNAL MEDICINE | Facility: CLINIC | Age: 45
End: 2020-01-06
Attending: FAMILY MEDICINE
Payer: OTHER GOVERNMENT

## 2020-01-06 ENCOUNTER — TELEPHONE (OUTPATIENT)
Dept: SURGERY | Facility: CLINIC | Age: 45
End: 2020-01-06

## 2020-01-06 ENCOUNTER — HOSPITAL ENCOUNTER (OUTPATIENT)
Dept: RADIOLOGY | Facility: HOSPITAL | Age: 45
Discharge: HOME | End: 2020-01-06
Payer: OTHER GOVERNMENT

## 2020-01-06 VITALS
HEART RATE: 96 BPM | DIASTOLIC BLOOD PRESSURE: 82 MMHG | WEIGHT: 198 LBS | BODY MASS INDEX: 31.08 KG/M2 | HEIGHT: 67 IN | SYSTOLIC BLOOD PRESSURE: 126 MMHG

## 2020-01-06 VITALS
DIASTOLIC BLOOD PRESSURE: 76 MMHG | BODY MASS INDEX: 30.92 KG/M2 | WEIGHT: 197 LBS | HEART RATE: 91 BPM | HEIGHT: 67 IN | TEMPERATURE: 98 F | SYSTOLIC BLOOD PRESSURE: 115 MMHG

## 2020-01-06 DIAGNOSIS — R10.31 RLQ ABDOMINAL PAIN: ICD-10-CM

## 2020-01-06 DIAGNOSIS — R10.31 RLQ ABDOMINAL PAIN: Primary | ICD-10-CM

## 2020-01-06 PROCEDURE — 99243 OFF/OP CNSLTJ NEW/EST LOW 30: CPT | Mod: S$PBB,,, | Performed by: SURGERY

## 2020-01-06 PROCEDURE — 74177 CT ABDOMEN PELVIS WITH CONTRAST: ICD-10-PCS | Mod: 26,,, | Performed by: RADIOLOGY

## 2020-01-06 PROCEDURE — 63600175 PHARM REV CODE 636 W HCPCS: Performed by: PHYSICIAN ASSISTANT

## 2020-01-06 PROCEDURE — G0379 DIRECT REFER HOSPITAL OBSERV: HCPCS

## 2020-01-06 PROCEDURE — 99215 OFFICE O/P EST HI 40 MIN: CPT | Mod: S$PBB,,, | Performed by: FAMILY MEDICINE

## 2020-01-06 PROCEDURE — 74177 CT ABD & PELVIS W/CONTRAST: CPT | Mod: 26,,, | Performed by: RADIOLOGY

## 2020-01-06 PROCEDURE — G0378 HOSPITAL OBSERVATION PER HR: HCPCS

## 2020-01-06 PROCEDURE — 99213 OFFICE O/P EST LOW 20 MIN: CPT | Mod: PBBFAC,25,27 | Performed by: FAMILY MEDICINE

## 2020-01-06 PROCEDURE — 96374 THER/PROPH/DIAG INJ IV PUSH: CPT

## 2020-01-06 PROCEDURE — 99213 OFFICE O/P EST LOW 20 MIN: CPT | Mod: PBBFAC | Performed by: SURGERY

## 2020-01-06 PROCEDURE — 99215 PR OFFICE/OUTPT VISIT, EST, LEVL V, 40-54 MIN: ICD-10-PCS | Mod: S$PBB,,, | Performed by: FAMILY MEDICINE

## 2020-01-06 PROCEDURE — 99243 PR OFFICE CONSULTATION,LEVEL III: ICD-10-PCS | Mod: S$PBB,,, | Performed by: SURGERY

## 2020-01-06 PROCEDURE — 74177 CT ABD & PELVIS W/CONTRAST: CPT | Mod: TC

## 2020-01-06 PROCEDURE — 99999 PR PBB SHADOW E&M-EST. PATIENT-LVL III: ICD-10-PCS | Mod: PBBFAC,,, | Performed by: SURGERY

## 2020-01-06 PROCEDURE — 99999 PR PBB SHADOW E&M-EST. PATIENT-LVL III: ICD-10-PCS | Mod: PBBFAC,,, | Performed by: FAMILY MEDICINE

## 2020-01-06 PROCEDURE — 96376 TX/PRO/DX INJ SAME DRUG ADON: CPT

## 2020-01-06 PROCEDURE — 99999 PR PBB SHADOW E&M-EST. PATIENT-LVL III: CPT | Mod: PBBFAC,,, | Performed by: FAMILY MEDICINE

## 2020-01-06 PROCEDURE — 25000003 PHARM REV CODE 250: Performed by: STUDENT IN AN ORGANIZED HEALTH CARE EDUCATION/TRAINING PROGRAM

## 2020-01-06 PROCEDURE — 25500020 PHARM REV CODE 255: Performed by: PHYSICIAN ASSISTANT

## 2020-01-06 PROCEDURE — 99999 PR PBB SHADOW E&M-EST. PATIENT-LVL III: CPT | Mod: PBBFAC,,, | Performed by: SURGERY

## 2020-01-06 PROCEDURE — 96375 TX/PRO/DX INJ NEW DRUG ADDON: CPT

## 2020-01-06 RX ORDER — HYDROCODONE BITARTRATE AND ACETAMINOPHEN 10; 325 MG/1; MG/1
1 TABLET ORAL
Status: DISCONTINUED | OUTPATIENT
Start: 2020-01-06 | End: 2020-02-21

## 2020-01-06 RX ORDER — SODIUM CHLORIDE, SODIUM LACTATE, POTASSIUM CHLORIDE, CALCIUM CHLORIDE 600; 310; 30; 20 MG/100ML; MG/100ML; MG/100ML; MG/100ML
INJECTION, SOLUTION INTRAVENOUS CONTINUOUS
Status: DISCONTINUED | OUTPATIENT
Start: 2020-01-06 | End: 2020-01-07

## 2020-01-06 RX ORDER — TRAZODONE HYDROCHLORIDE 100 MG/1
100 TABLET ORAL NIGHTLY
Status: DISCONTINUED | OUTPATIENT
Start: 2020-01-06 | End: 2020-01-07 | Stop reason: HOSPADM

## 2020-01-06 RX ORDER — LIDOCAINE HYDROCHLORIDE 10 MG/ML
1 INJECTION, SOLUTION EPIDURAL; INFILTRATION; INTRACAUDAL; PERINEURAL ONCE
Status: CANCELLED | OUTPATIENT
Start: 2020-01-06 | End: 2020-01-06

## 2020-01-06 RX ORDER — HYDROMORPHONE HYDROCHLORIDE 2 MG/ML
0.5 INJECTION, SOLUTION INTRAMUSCULAR; INTRAVENOUS; SUBCUTANEOUS EVERY 4 HOURS PRN
Status: CANCELLED | OUTPATIENT
Start: 2020-01-06

## 2020-01-06 RX ORDER — SODIUM CHLORIDE, SODIUM LACTATE, POTASSIUM CHLORIDE, CALCIUM CHLORIDE 600; 310; 30; 20 MG/100ML; MG/100ML; MG/100ML; MG/100ML
INJECTION, SOLUTION INTRAVENOUS CONTINUOUS
Status: CANCELLED | OUTPATIENT
Start: 2020-01-06

## 2020-01-06 RX ORDER — HYDROMORPHONE HYDROCHLORIDE 2 MG/ML
1 INJECTION, SOLUTION INTRAMUSCULAR; INTRAVENOUS; SUBCUTANEOUS EVERY 4 HOURS PRN
Status: CANCELLED | OUTPATIENT
Start: 2020-01-06

## 2020-01-06 RX ORDER — ONDANSETRON 2 MG/ML
4 INJECTION INTRAMUSCULAR; INTRAVENOUS EVERY 12 HOURS PRN
Status: CANCELLED | OUTPATIENT
Start: 2020-01-06

## 2020-01-06 RX ORDER — ONDANSETRON 2 MG/ML
4 INJECTION INTRAMUSCULAR; INTRAVENOUS EVERY 12 HOURS PRN
Status: DISCONTINUED | OUTPATIENT
Start: 2020-01-06 | End: 2020-01-07

## 2020-01-06 RX ORDER — LIDOCAINE HYDROCHLORIDE 10 MG/ML
1 INJECTION, SOLUTION EPIDURAL; INFILTRATION; INTRACAUDAL; PERINEURAL ONCE
Status: DISCONTINUED | OUTPATIENT
Start: 2020-01-06 | End: 2020-01-07

## 2020-01-06 RX ORDER — HYDROCODONE BITARTRATE AND ACETAMINOPHEN 10; 325 MG/1; MG/1
1 TABLET ORAL EVERY 6 HOURS PRN
Qty: 4 TABLET | Refills: 0 | Status: SHIPPED | OUTPATIENT
Start: 2020-01-06 | End: 2020-02-21

## 2020-01-06 RX ORDER — SERTRALINE HYDROCHLORIDE 50 MG/1
50 TABLET, FILM COATED ORAL DAILY
Status: DISCONTINUED | OUTPATIENT
Start: 2020-01-07 | End: 2020-01-07 | Stop reason: HOSPADM

## 2020-01-06 RX ORDER — HYDROMORPHONE HYDROCHLORIDE 1 MG/ML
0.5 INJECTION, SOLUTION INTRAMUSCULAR; INTRAVENOUS; SUBCUTANEOUS EVERY 4 HOURS PRN
Status: DISCONTINUED | OUTPATIENT
Start: 2020-01-06 | End: 2020-01-07

## 2020-01-06 RX ORDER — HYDROMORPHONE HYDROCHLORIDE 1 MG/ML
1 INJECTION, SOLUTION INTRAMUSCULAR; INTRAVENOUS; SUBCUTANEOUS EVERY 4 HOURS PRN
Status: DISCONTINUED | OUTPATIENT
Start: 2020-01-06 | End: 2020-01-07

## 2020-01-06 RX ADMIN — HYDROMORPHONE HYDROCHLORIDE 1 MG: 1 INJECTION, SOLUTION INTRAMUSCULAR; INTRAVENOUS; SUBCUTANEOUS at 06:01

## 2020-01-06 RX ADMIN — IOHEXOL 100 ML: 350 INJECTION, SOLUTION INTRAVENOUS at 02:01

## 2020-01-06 RX ADMIN — ONDANSETRON 4 MG: 2 INJECTION INTRAMUSCULAR; INTRAVENOUS at 09:01

## 2020-01-06 RX ADMIN — TRAZODONE HYDROCHLORIDE 100 MG: 100 TABLET ORAL at 09:01

## 2020-01-06 RX ADMIN — IOHEXOL 15 ML: 350 INJECTION, SOLUTION INTRAVENOUS at 01:01

## 2020-01-06 RX ADMIN — HYDROMORPHONE HYDROCHLORIDE 1 MG: 1 INJECTION, SOLUTION INTRAMUSCULAR; INTRAVENOUS; SUBCUTANEOUS at 09:01

## 2020-01-06 NOTE — PROGRESS NOTES
History & Physical    SUBJECTIVE:     History of Present Illness:  Patient is a 44 y.o. male presents with RLQ pain. Onset of symptoms was abrupt starting a few days ago with worsening course since that time. He states the pain started a few days ago, and was centered in the middle of his abdomen. It is getting progressively worse. He reports nausea, vomiting and anorexia. He presented this morning to his PCP who referred him to general surgery. Patient denies fevers, chills. Symptoms are aggravated by movement and palpation. Symptoms improve with nothing. He is not on any blood thinners or steroids. He is a current smoker. He is non diabetic.    Chief Complaint   Patient presents with    Consult       Review of patient's allergies indicates:  No Known Allergies    Current Outpatient Medications   Medication Sig Dispense Refill    ARIPiprazole lauroxil (ARISTADA) 441 mg/1.6 mL injection Inject 1.6 mLs (441 mg total) into the muscle every 28 days. 1 Syringe 0    sertraline (ZOLOFT) 50 MG tablet Take 1 tablet (50 mg total) by mouth once daily. 90 tablet 0    prazosin (MINIPRESS) 1 MG Cap Take 1 capsule (1 mg total) by mouth every evening. 90 capsule 0    traZODone (DESYREL) 100 MG tablet Take 1 tablet (100 mg total) by mouth every evening. 90 tablet 0     No current facility-administered medications for this visit.        Past Medical History:   Diagnosis Date    Acute gastritis without hemorrhage 06/03/2019    aspirin induced    Addiction to drug     THCA    ADHD (attention deficit hyperactivity disorder)     [facility]    Anxiety     Attempted suicide 06/20/19    pt stated attempted 2 weeks ago- aspirin    Depression     Hallucination     Heavy smoker (more than 20 cigarettes per day)     History of psychiatric hospitalization     History of rectal bleeding 2012    [facility] Colonoscopy without polyps.    Hx of psychiatric care     Major depression 6/24/2019    Major  "depressive disorder, recurrent, severe with psychotic features 6/25/2019    Migraine headache     Psychiatric problem     PTSD (post-traumatic stress disorder) 6/25/2019    Shingles 2013    Substance abuse     Suicide attempt     attempted twice    Therapy      Past Surgical History:   Procedure Laterality Date    CYST REMOVAL N/A 2013    near heart    GANGLION CYST EXCISION Right     wrist     Family History   Problem Relation Age of Onset    Stroke Mother     Diabetes Father     Hypertension Father     Lung cancer Father     Lung cancer Maternal Grandmother     Lung cancer Maternal Grandfather     Lung cancer Paternal Grandmother     Lung cancer Paternal Grandfather     Lung cancer Maternal Uncle     Lung cancer Maternal Uncle     Depression Maternal Aunt      Social History     Tobacco Use    Smoking status: Current Every Day Smoker     Packs/day: 1.00     Start date: 1997    Smokeless tobacco: Never Used   Substance Use Topics    Alcohol use: No    Drug use: Yes     Frequency: 3.0 times per week     Types: Marijuana     Comment: last used one month ago        Review of Systems:  Review of Systems   Constitutional: Negative for chills and fever.   HENT: Negative for congestion and sneezing.    Eyes: Negative for photophobia and visual disturbance.   Respiratory: Negative for cough and shortness of breath.    Cardiovascular: Negative for chest pain and palpitations.   Gastrointestinal: Positive for abdominal pain (RLQ), nausea and vomiting. Negative for constipation and diarrhea.   Endocrine: Negative for cold intolerance and heat intolerance.   Musculoskeletal: Negative for arthralgias and myalgias.   Skin: Negative for rash and wound.   Psychiatric/Behavioral: Negative for agitation.       OBJECTIVE:     Vital Signs (Most Recent)  Temp: 98.2 °F (36.8 °C) (01/06/20 1110)  Pulse: 91 (01/06/20 1110)  BP: 115/76 (01/06/20 1110)  5' 7" (1.702 m)  89.4 kg (197 lb)     Physical Exam:  Physical " Exam   Constitutional: He is oriented to person, place, and time. He appears well-developed and well-nourished. No distress.   Appears uncomfortable   HENT:   Head: Normocephalic and atraumatic.   Eyes: EOM are normal. No scleral icterus.   Neck: Normal range of motion. Neck supple.   Cardiovascular: Normal rate and regular rhythm.   Pulmonary/Chest: Effort normal. No respiratory distress.   Abdominal:   Soft, +TTP RLQ  No rebound  Non distended   Musculoskeletal: Normal range of motion. He exhibits no edema or tenderness.   Neurological: He is alert and oriented to person, place, and time.   Skin: Skin is warm and dry.   Psychiatric: He has a normal mood and affect.     ASSESSMENT/PLAN:   45 yo male w likely appendicitis  -will obtain stat labs and CT scan  -orders placed for admit  -will send norco to pharmacy downstairs for pain control

## 2020-01-06 NOTE — LETTER
January 6, 2020      Pancho Jones MD  [address]  [facility] [address]  [facility] [address]           [facility] - General Surgery  [address]  [facility] [address]  Phone: [phone]          Patient: Donell Park   MR Number: [id number]   YOB: 1975   Date of Visit: 1/6/2020       Dear Dr. Pancho Jones:    Thank you for referring Donell Park to me for evaluation. Attached you will find relevant portions of my assessment and plan of care.    If you have questions, please do not hesitate to call me. I look forward to following Donell Park along with you.    Sincerely,    Barrington Rubio MD    Enclosure  CC:  No Recipients    If you would like to receive this communication electronically, please contact [email or web address] or [phone] to request more information on [employer] Link access.    For providers and/or their staff who would like to refer a patient to [facility], please contact us through our one-stop-shop provider referral line, [facility], at [phone].    If you feel you have received this communication in error or would no longer like to receive these types of communications, please e-mail [email or web address]

## 2020-01-06 NOTE — TELEPHONE ENCOUNTER
----- Message from Nereyda Shrestha sent at 1/6/2020  2:29 PM CST -----  Contact: Patient  Staff Message     Caller name: Pt    Reason for call: Pt needs to speak with the nurse, wants to know if he's being admitted today after his lab an d CT.        Communication Preference: [phone]    Additional Information:

## 2020-01-06 NOTE — TELEPHONE ENCOUNTER
Per Dr. Rubio, he is to be admitted as planned and is to remain NPO for now.  He verbalized understanding and instructions given to Admit Office.

## 2020-01-06 NOTE — PROGRESS NOTES
"CHIEF COMPLAINT: Acute onset right lower quadrant pain    HISTORY OF PRESENT ILLNESS: The patient presents with the acute onset of right lower quadrant pain.  The patient's symptoms began a couple of days ago but got considerably worse one day ago.  The pain is continuous and kept him up most of last night.  There is no emesis or diarrhea.  The patient denies chest pain or shortness of breath.  Patient denies a history of inflammatory bowel disease.    He does have a long complicated psychiatric history.    REVIEW OF SYSTEMS:  GENERAL: No fatigability or weight loss.  SKIN: No rashes, itching or changes in color or texture of skin.  HEAD: No headaches or recent head trauma.  EYES: Visual acuity fine. No photophobia, ocular pain or diplopia.  EARS: Denies ear pain, discharge or vertigo.  NOSE: No loss of smell, no epistaxis or postnasal drip.  MOUTH & THROAT: No hoarseness or change in voice. No excessive gum bleeding.  NODES: Denies swollen glands.  CHEST: Denies GARIBAY, cyanosis, wheezing, cough and sputum production.  CARDIOVASCULAR: Denies chest pain, PND, orthopnea or reduced exercise tolerance.  ABDOMEN:. No weight loss.  The patient has right lower quadrant abdominal pain.  The patient denies hematemesis or blood in stool.  URINARY: No flank pain, dysuria or hematuria.  PERIPHERAL VASCULAR: No claudication or cyanosis.  MUSCULOSKELETAL: No joint stiffness or swelling. Denies back pain.  NEUROLOGIC: No history of seizures, paralysis, alteration of gait or coordination.    SOCIAL HISTORY: Unchanged since recent note by PCP.    PHYSICAL EXAMINATION:   Blood pressure 126/82, pulse 96, height 5' 7" (1.702 m), weight 89.8 kg (197 lb 15.6 oz).    APPEARANCE: Well nourished, well developed, in no acute distress.    HEAD: Normocephalic, atraumatic.  EYES: PERRL. EOMI.  Conjunctivae without injection and  anicteric  EARS: TM's intact. Light reflex normal. No retraction or perforation.    NOSE: Mucosa pink. Airway " clear.  MOUTH & THROAT: No tonsillar enlargement. No pharyngeal erythema or exudate. No stridor.  NECK: Supple.   NODES: No cervical, axillary or inguinal lymph node enlargement.  CHEST: Lungs clear to auscultation.  No retractions are noted.  No rales or rhonchi are present.  CARDIOVASCULAR: Normal S1, S2. No rubs, murmurs or gallops.  ABDOMEN: Bowel sounds normal. Not distended. Soft. No guarding or rebound tenderness or masses.  No ascites is noted.  MUSCULOSKELETAL:  There is no clubbing, cyanosis, or edema of the extremities x4.  There is full range of motion of the lumbar spine.  There is full range of motion of the extremities x4.  There is no deformity noted.    NEUROLOGIC:       Normal speech development.      Hearing normal.      Normal gait.      Motor and sensory exams grossly normal.  PSYCHIATRIC: Patient is alert and oriented x3.  Thought processes are all normal.  There is no homicidality.  There is no suicidality.  There is no evidence of psychosis.    LABORATORY/RADIOLOGY: Chart reviewed.    ASSESSMENT:   Right lower quadrant pain likely appendicitis    PLAN:  Instead of sending him to the emergency room I was able to get a general surgery appointment within an hour.  Ultimately he had a CT which ruled out appendicitis.

## 2020-01-07 VITALS
DIASTOLIC BLOOD PRESSURE: 53 MMHG | TEMPERATURE: 98 F | HEIGHT: 68 IN | HEART RATE: 61 BPM | WEIGHT: 196 LBS | OXYGEN SATURATION: 95 % | BODY MASS INDEX: 29.7 KG/M2 | SYSTOLIC BLOOD PRESSURE: 93 MMHG | RESPIRATION RATE: 18 BRPM

## 2020-01-07 LAB
ALBUMIN SERPL BCP-MCNC: 3.2 G/DL (ref 3.5–5.2)
ALP SERPL-CCNC: 55 U/L (ref 55–135)
ALT SERPL W/O P-5'-P-CCNC: 16 U/L (ref 10–44)
ANION GAP SERPL CALC-SCNC: 8 MMOL/L (ref 8–16)
AST SERPL-CCNC: 25 U/L (ref 10–40)
BASOPHILS # BLD AUTO: 0.04 K/UL (ref 0–0.2)
BASOPHILS NFR BLD: 0.6 % (ref 0–1.9)
BILIRUB SERPL-MCNC: 1.3 MG/DL (ref 0.1–1)
BUN SERPL-MCNC: 11 MG/DL (ref 6–20)
CALCIUM SERPL-MCNC: 8.2 MG/DL (ref 8.7–10.5)
CHLORIDE SERPL-SCNC: 106 MMOL/L (ref 95–110)
CO2 SERPL-SCNC: 20 MMOL/L (ref 23–29)
CREAT SERPL-MCNC: 0.8 MG/DL (ref 0.5–1.4)
DIFFERENTIAL METHOD: NORMAL
EOSINOPHIL # BLD AUTO: 0.4 K/UL (ref 0–0.5)
EOSINOPHIL NFR BLD: 6 % (ref 0–8)
ERYTHROCYTE [DISTWIDTH] IN BLOOD BY AUTOMATED COUNT: 13.8 % (ref 11.5–14.5)
EST. GFR  (AFRICAN AMERICAN): >60 ML/MIN/1.73 M^2
EST. GFR  (NON AFRICAN AMERICAN): >60 ML/MIN/1.73 M^2
GLUCOSE SERPL-MCNC: 75 MG/DL (ref 70–110)
HCT VFR BLD AUTO: 44.8 % (ref 40–54)
HGB BLD-MCNC: 14.9 G/DL (ref 14–18)
IMM GRANULOCYTES # BLD AUTO: 0.01 K/UL (ref 0–0.04)
IMM GRANULOCYTES NFR BLD AUTO: 0.2 % (ref 0–0.5)
LYMPHOCYTES # BLD AUTO: 2.3 K/UL (ref 1–4.8)
LYMPHOCYTES NFR BLD: 35.6 % (ref 18–48)
MAGNESIUM SERPL-MCNC: 1.8 MG/DL (ref 1.6–2.6)
MCH RBC QN AUTO: 30.8 PG (ref 27–31)
MCHC RBC AUTO-ENTMCNC: 33.3 G/DL (ref 32–36)
MCV RBC AUTO: 93 FL (ref 82–98)
MONOCYTES # BLD AUTO: 0.9 K/UL (ref 0.3–1)
MONOCYTES NFR BLD: 13.5 % (ref 4–15)
NEUTROPHILS # BLD AUTO: 2.9 K/UL (ref 1.8–7.7)
NEUTROPHILS NFR BLD: 44.1 % (ref 38–73)
NRBC BLD-RTO: 0 /100 WBC
PHOSPHATE SERPL-MCNC: 3.2 MG/DL (ref 2.7–4.5)
PLATELET # BLD AUTO: 236 K/UL (ref 150–350)
PMV BLD AUTO: 11.2 FL (ref 9.2–12.9)
POTASSIUM SERPL-SCNC: 4.2 MMOL/L (ref 3.5–5.1)
PROT SERPL-MCNC: 6 G/DL (ref 6–8.4)
RBC # BLD AUTO: 4.84 M/UL (ref 4.6–6.2)
SODIUM SERPL-SCNC: 134 MMOL/L (ref 136–145)
WBC # BLD AUTO: 6.46 K/UL (ref 3.9–12.7)

## 2020-01-07 PROCEDURE — 80053 COMPREHEN METABOLIC PANEL: CPT

## 2020-01-07 PROCEDURE — 83735 ASSAY OF MAGNESIUM: CPT

## 2020-01-07 PROCEDURE — G0378 HOSPITAL OBSERVATION PER HR: HCPCS

## 2020-01-07 PROCEDURE — 96376 TX/PRO/DX INJ SAME DRUG ADON: CPT

## 2020-01-07 PROCEDURE — 36415 COLL VENOUS BLD VENIPUNCTURE: CPT

## 2020-01-07 PROCEDURE — 84100 ASSAY OF PHOSPHORUS: CPT

## 2020-01-07 PROCEDURE — 85025 COMPLETE CBC W/AUTO DIFF WBC: CPT

## 2020-01-07 PROCEDURE — 25000003 PHARM REV CODE 250: Performed by: STUDENT IN AN ORGANIZED HEALTH CARE EDUCATION/TRAINING PROGRAM

## 2020-01-07 PROCEDURE — 63600175 PHARM REV CODE 636 W HCPCS: Performed by: PHYSICIAN ASSISTANT

## 2020-01-07 PROCEDURE — 25000003 PHARM REV CODE 250: Performed by: PHYSICIAN ASSISTANT

## 2020-01-07 PROCEDURE — 63600175 PHARM REV CODE 636 W HCPCS: Performed by: STUDENT IN AN ORGANIZED HEALTH CARE EDUCATION/TRAINING PROGRAM

## 2020-01-07 RX ORDER — OXYCODONE AND ACETAMINOPHEN 10; 325 MG/1; MG/1
1 TABLET ORAL EVERY 4 HOURS PRN
Status: DISCONTINUED | OUTPATIENT
Start: 2020-01-07 | End: 2020-01-07 | Stop reason: HOSPADM

## 2020-01-07 RX ORDER — ONDANSETRON 4 MG/1
4 TABLET, ORALLY DISINTEGRATING ORAL EVERY 6 HOURS PRN
Status: DISCONTINUED | OUTPATIENT
Start: 2020-01-07 | End: 2020-01-07 | Stop reason: HOSPADM

## 2020-01-07 RX ORDER — ONDANSETRON 4 MG/1
4 TABLET, ORALLY DISINTEGRATING ORAL ONCE
Status: DISCONTINUED | OUTPATIENT
Start: 2020-01-07 | End: 2020-01-07

## 2020-01-07 RX ORDER — OXYCODONE AND ACETAMINOPHEN 5; 325 MG/1; MG/1
1 TABLET ORAL EVERY 4 HOURS PRN
Status: DISCONTINUED | OUTPATIENT
Start: 2020-01-07 | End: 2020-01-07 | Stop reason: HOSPADM

## 2020-01-07 RX ADMIN — SERTRALINE HYDROCHLORIDE 50 MG: 50 TABLET ORAL at 09:01

## 2020-01-07 RX ADMIN — HYDROMORPHONE HYDROCHLORIDE 1 MG: 1 INJECTION, SOLUTION INTRAMUSCULAR; INTRAVENOUS; SUBCUTANEOUS at 03:01

## 2020-01-07 RX ADMIN — SODIUM CHLORIDE, SODIUM LACTATE, POTASSIUM CHLORIDE, AND CALCIUM CHLORIDE 1000 ML: 600; 310; 30; 20 INJECTION, SOLUTION INTRAVENOUS at 11:01

## 2020-01-07 RX ADMIN — OXYCODONE HYDROCHLORIDE AND ACETAMINOPHEN 1 TABLET: 10; 325 TABLET ORAL at 09:01

## 2020-01-07 RX ADMIN — OXYCODONE HYDROCHLORIDE AND ACETAMINOPHEN 1 TABLET: 10; 325 TABLET ORAL at 12:01

## 2020-01-07 RX ADMIN — HYDROMORPHONE HYDROCHLORIDE 1 MG: 1 INJECTION, SOLUTION INTRAMUSCULAR; INTRAVENOUS; SUBCUTANEOUS at 07:01

## 2020-01-07 NOTE — PROGRESS NOTES
[facility]  General Surgery  Progress Note    Subjective:     Post-Op Info:  * No surgery found *         Interval History:   Patient seen and examined, no acute events overnight  Persistent abdominal pain, improved slightly from admit  Remains NPO  Afebrile/VSS    Medications:  Continuous Infusions:   lactated ringers       Scheduled Meds:   sertraline  50 mg Oral Daily    traZODone  100 mg Oral QHS     PRN Meds:HYDROmorphone, HYDROmorphone, ondansetron, promethazine (PHENERGAN) IVPB     Review of patient's allergies indicates:  No Known Allergies  Objective:     Vital Signs (Most Recent):  Temp: 97.3 °F (36.3 °C) (01/07/20 0606)  Pulse: (!) 53 (01/07/20 0606)  Resp: 16 (01/07/20 0606)  BP: 97/62 (01/07/20 0606)  SpO2: 96 % (01/07/20 0606) Vital Signs (24h Range):  Temp:  [94.8 °F (34.9 °C)-98.2 °F (36.8 °C)] 97.3 °F (36.3 °C)  Pulse:  [53-96] 53  Resp:  [16-18] 16  SpO2:  [93 %-96 %] 96 %  BP: ()/(59-82) 97/62     Weight: 88.9 kg (196 lb)  Body mass index is 30.24 kg/m².    Intake/Output - Last 3 Shifts     None          Physical Exam   Constitutional: He appears well-developed and well-nourished. No distress.   HENT:   Head: Normocephalic and atraumatic.   Cardiovascular: Normal rate and regular rhythm.   Pulmonary/Chest: Effort normal. No respiratory distress.   Abdominal:   Soft, +TTP RLQ       Significant Labs:  CBC:   Recent Labs   Lab 01/07/20 0446   WBC 6.46   RBC 4.84   HGB 14.9   HCT 44.8      MCV 93   MCH 30.8   MCHC 33.3     BMP:   Recent Labs   Lab 01/07/20 0447   GLU 75   *   K 4.2      CO2 20*   BUN 11   CREATININE 0.8   CALCIUM 8.2*   MG 1.8     CMP:   Recent Labs   Lab 01/07/20  0447   GLU 75   CALCIUM 8.2*   ALBUMIN 3.2*   PROT 6.0   *   K 4.2   CO2 20*      BUN 11   CREATININE 0.8   ALKPHOS 55   ALT 16   AST 25   BILITOT 1.3*     LFTs:   Recent Labs   Lab 01/07/20  0447   ALT 16   AST 25   ALKPHOS 55   BILITOT 1.3*   PROT 6.0   ALBUMIN  3.2*     Assessment/Plan:     * RLQ abdominal pain  44 y.o. male with depression, PTSD, substance abuse presents with acute onset RLQ abdominal pain, CT without evidence of acute intra-abdominal pathology. Labs are reassuring     - may consider diagnostic laparoscopy  - Hold off on abx for now given normal CT scan and labs  - PO pain/nausea meds PRN  - continue NPO  - IVF   - SCDs, IS    Depression  Sertraline, trazodone         Denisha Shafer PA-C   [id number]  General Surgery  [facility]

## 2020-01-07 NOTE — DISCHARGE SUMMARY
[facility]  General Surgery  Discharge Summary      Patient Name: Donell Park  MRN: [id number]  Admission Date: 1/6/2020  Hospital Length of Stay: 0 days  Discharge Date and Time:  01/07/2020 11:52 AM  Attending Physician: Barrington Rubio MD   Discharging Provider: Denisha Shafer PA-C  Primary Care Provider: Yenny Resendiz MD    HPI:   History of Present Illness:  Patient is a 44 y.o. male with depression, PTSD, substance abuse presented to surgery clinic earlier today with RLQ pain. Onset of symptoms was abrupt starting a few days ago with worsening course since that time. He states the pain started a few days ago, and was centered in the middle of his abdomen. It is getting progressively worse. He reports nausea, vomiting and anorexia. He presented this morning to his PCP who referred him to general surgery. Patient denies fevers, chills. Symptoms are aggravated by movement and palpation. Symptoms improve with nothing. He is not on any blood thinners or steroids. He is a current smoker. He is non diabetic.  CT scan without evidence of acute appendicitis. Labs are wnl. Patient admitted for observation due significant RLQ pain.        * No surgery found *      Indwelling Lines/Drains at time of discharge:   Lines/Drains/Airways     None               Hospital Course:   Patient presented to clinic with RLQ pain, suspicious for appendicitis. Labs and CT scan was obtained which were unremarkable for any acute pathology. He was started on a regular diet and IVF and pain medication was discontinued. Abx were not indicated. His vitals remained stable, and he was afebrile throughout his hospital course. Labs were reviewed and electrolytes were replaced appropriately. Diet was advanced, and he was able to tolerate a regular diet prior to discharge. He was ambulating without difficulty and had normal bowel function prior to discharge. Patient was deemed suitable for discharge on hospital day 1.  He was discharged home with medications and instructions as below. He voiced understanding of the instructions prior to discharge.     For more thorough information, please refer to the hospital records.    Consults:     Significant Diagnostic Studies: Labs:   BMP:   Recent Labs   Lab 01/06/20  1209 01/07/20  0447   GLU 90 75    134*   K 4.1 4.2    106   CO2 23 20*   BUN 14 11   CREATININE 1.0 0.8   CALCIUM 9.6 8.2*   MG  --  1.8   , CMP   Recent Labs   Lab 01/06/20  1209 01/07/20  0447    134*   K 4.1 4.2    106   CO2 23 20*   GLU 90 75   BUN 14 11   CREATININE 1.0 0.8   CALCIUM 9.6 8.2*   PROT 7.2 6.0   ALBUMIN 4.0 3.2*   BILITOT 1.2* 1.3*   ALKPHOS 67 55   AST 19 25   ALT 19 16   ANIONGAP 8 8   ESTGFRAFRICA >60.0 >60.0   EGFRNONAA >60.0 >60.0   , CBC   Recent Labs   Lab 01/06/20  1209 01/07/20  0446   WBC 7.78 6.46   HGB 17.1 14.9   HCT 49.7 44.8    236    All labs within the past 24 hours have been reviewed    Radiology:  Narrative     EXAMINATION:  CT ABDOMEN PELVIS WITH CONTRAST    CLINICAL HISTORY:  RLQ pain, appendicitis suspected Right lower quadrant pain    TECHNIQUE:  Axial CT images of the abdomen and pelvis obtained after the administration of intravenous contrast 100 cc of Omnipaque 350.  Patient was also administered 30 mL Omnipaque 350 oral contrast.  Coronal and sagittal reformats are provided.    COMPARISON:  CT abdomen and pelvis 02/23/2019.    FINDINGS:  Lung bases are clear.  No pleural effusion.  Heart is not enlarged.  No pericardial effusion.    Liver is normal in size and attenuation.  No focal hepatic abnormality is seen.  Gallbladder is normal.  No intra or extrahepatic biliary ductal dilatation.  Spleen is normal in size.    Stomach, pancreas, and adrenal glands show no significant abnormalities.    Kidneys are normal in size and location.  No renal mass or hydronephrosis.  Visualized ureters are normal in course to the bladder.  Urinary bladder is within  normal limits.    Prostate is upper normal in size.  Visualized loops of small large bowel show no evidence of inflammation or obstruction.  Appendix is visualized and appears normal.    No lymphadenopathy in the abdomen and pelvis.  No free intraperitoneal air or ascites.  Abdominal aorta is normal in course and caliber without significant atherosclerotic calcifications or aneurysmal dilatation.    Osseous structures show no significant abnormalities.  Extraperitoneal soft tissues appear within normal limits.   Impression       No acute process in the abdomen and pelvis.  No evidence of appendicitis.    Electronically signed by resident: Jacqueline Myers  Date: 01/06/2020  Time: 14:55    Electronically signed by: Alonzo Mccabe MD  Date: 01/06/2020  Time: 15:28       Pending Diagnostic Studies:     None        Final Active Diagnoses:    Diagnosis Date Noted POA    PRINCIPAL PROBLEM:  RLQ abdominal pain [R10.31] 01/06/2020 Yes    Depression [F32.9] 11/19/2018 Yes      Problems Resolved During this Admission:      Discharged Condition: good    Disposition: Home or Self Care    Follow Up:  Follow-up Information     Yenny Resendiz MD In 1 week.    Specialty:  Internal Medicine  Contact information:  [address] [address]  [phone]                 Patient Instructions:      Diet Adult Regular     Notify your health care provider if you experience any of the following:  difficulty breathing or increased cough     Notify your health care provider if you experience any of the following:  redness, tenderness, or signs of infection (pain, swelling, redness, odor or green/yellow discharge around incision site)     Notify your health care provider if you experience any of the following:  severe uncontrolled pain     Notify your health care provider if you experience any of the following:  persistent nausea and vomiting or diarrhea     Notify your health care provider if you experience any of the  following:  temperature >100.4     Activity as tolerated     Medications:  Reconciled Home Medications:      Medication List      CONTINUE taking these medications    ARIPiprazole lauroxil 441 mg/1.6 mL injection  Commonly known as:  Aristada  Inject 1.6 mLs (441 mg total) into the muscle every 28 days.     HYDROcodone-acetaminophen  mg per tablet  Commonly known as:  NORCO  Take 1 tablet by mouth every 6 (six) hours as needed for Pain.     prazosin 1 MG Cap  Commonly known as:  MINIPRESS  Take 1 capsule (1 mg total) by mouth every evening.     sertraline 50 MG tablet  Commonly known as:  ZOLOFT  Take 1 tablet (50 mg total) by mouth once daily.     traZODone 100 MG tablet  Commonly known as:  DESYREL  Take 1 tablet (100 mg total) by mouth every evening.          Time spent on the discharge of patient: 2 minutes    Denisha Shafer PA-C  General Surgery  [facility]

## 2020-01-07 NOTE — NURSING
Paged Dr Rubio's team to confirm diet, as patient has regular diet ordered this am by team and ate breakfast, but note says NPO.     0925am spoke with team, okay for patient to eat.

## 2020-01-07 NOTE — NURSING
Paged Dr Rubio's team again, as patient upset that discharge orders are in and patient has not been updated.      1202 Spoke with  Dr Hendricks again, team coming to talk with patient.

## 2020-01-07 NOTE — ASSESSMENT & PLAN NOTE
44 y.o. male with depression, PTSD, substance abuse presents with acute onset RLQ abdominal pain, CT without evidence of acute intra-abdominal pathology. Labs are reassuring     - may consider diagnostic laparoscopy  - Hold off on abx for now given normal CT scan and labs  - PO pain/nausea meds PRN  - continue NPO  - IVF   - SCDs, IS

## 2020-01-07 NOTE — SUBJECTIVE & OBJECTIVE
Interval History:   Patient seen and examined, no acute events overnight  Persistent abdominal pain, improved slightly from admit  Remains NPO  Afebrile/VSS    Medications:  Continuous Infusions:   lactated ringers       Scheduled Meds:   sertraline  50 mg Oral Daily    traZODone  100 mg Oral QHS     PRN Meds:HYDROmorphone, HYDROmorphone, ondansetron, promethazine (PHENERGAN) IVPB     Review of patient's allergies indicates:  No Known Allergies  Objective:     Vital Signs (Most Recent):  Temp: 97.3 °F (36.3 °C) (01/07/20 0606)  Pulse: (!) 53 (01/07/20 0606)  Resp: 16 (01/07/20 0606)  BP: 97/62 (01/07/20 0606)  SpO2: 96 % (01/07/20 0606) Vital Signs (24h Range):  Temp:  [94.8 °F (34.9 °C)-98.2 °F (36.8 °C)] 97.3 °F (36.3 °C)  Pulse:  [53-96] 53  Resp:  [16-18] 16  SpO2:  [93 %-96 %] 96 %  BP: ()/(59-82) 97/62     Weight: 88.9 kg (196 lb)  Body mass index is 30.24 kg/m².    Intake/Output - Last 3 Shifts     None          Physical Exam   Constitutional: He appears well-developed and well-nourished. No distress.   HENT:   Head: Normocephalic and atraumatic.   Cardiovascular: Normal rate and regular rhythm.   Pulmonary/Chest: Effort normal. No respiratory distress.   Abdominal:   Soft, +TTP RLQ       Significant Labs:  CBC:   Recent Labs   Lab 01/07/20 0446   WBC 6.46   RBC 4.84   HGB 14.9   HCT 44.8      MCV 93   MCH 30.8   MCHC 33.3     BMP:   Recent Labs   Lab 01/07/20 0447   GLU 75   *   K 4.2      CO2 20*   BUN 11   CREATININE 0.8   CALCIUM 8.2*   MG 1.8     CMP:   Recent Labs   Lab 01/07/20 0447   GLU 75   CALCIUM 8.2*   ALBUMIN 3.2*   PROT 6.0   *   K 4.2   CO2 20*      BUN 11   CREATININE 0.8   ALKPHOS 55   ALT 16   AST 25   BILITOT 1.3*     LFTs:   Recent Labs   Lab 01/07/20  0447   ALT 16   AST 25   ALKPHOS 55   BILITOT 1.3*   PROT 6.0   ALBUMIN 3.2*

## 2020-01-07 NOTE — NURSING
Paged Dr Hurtado team, patient hypotensive, 93/53 last few readings in 90s.  Patient denies dizziness or lightheadedness.  Tolerating PO intake.  Pain meds changed to oral.      1137am spoke with Dr Hendricks, monitor patient no orders given.  1146am Spoke with Denisha Shafer PA-C/ states okay with BP as patient asymptomatic, patient will be discharged to home.    1150am Dr Hendricks called back and placing order for LR Bolus x1.

## 2020-01-07 NOTE — PLAN OF CARE
Yenny Resendiz MD     [id number] - [place] - [address] AT [facility] [address]  [address] [place] [address]  Phone: [phone] Fax: [phone]    [id number] [place] [place] [facility] [address] AT [facility] [address] [facility]  [address] [address]  Phone: [phone] Fax: [phone]    Payor:  / Plan:  [employer] / Product Type: Government /       01/07/20 1020   Discharge Assessment   Assessment Type Discharge Planning Assessment   Confirmed/corrected address and phone number on facesheet? Yes   Assessment information obtained from? Patient   Expected Length of Stay (days) 0   Communicated expected length of stay with patient/caregiver yes   Prior to hospitilization cognitive status: Alert/Oriented   Prior to hospitalization functional status: Independent   Current cognitive status: Alert/Oriented   Current Functional Status: Independent   Lives With alone   Able to Return to Prior Arrangements yes   Is patient able to care for self after discharge? Yes   Who are your caregiver(s) and their phone number(s)? Lena Donatoo-Spouse    [phone]   Patient's perception of discharge disposition home or selfcare   Readmission Within the Last 30 Days no previous admission in last 30 days   Patient currently being followed by outpatient case management? No   Patient currently receives any other outside agency services? No   Equipment Currently Used at Home none   Do you have any problems affording any of your prescribed medications? No   Is the patient taking medications as prescribed? yes   Does the patient have transportation home? Yes   Transportation Anticipated family or friend will provide   Dialysis Name and Scheduled days N/A   Does the patient receive services at the Coumadin Clinic? No   Discharge Plan A Home   Discharge Plan B Home   DME Needed Upon Discharge  none   Patient/Family in  Agreement with Plan yes

## 2020-01-07 NOTE — H&P
[facility]  General Surgery  History & Physical    Patient Name: Donell Park  MRN: [id number]  Admission Date: 1/6/2020  Attending Physician: Barrington Rubio MD   Primary Care Provider: Yenny Resendiz MD    Patient information was obtained from patient, past medical records and ER records.     Subjective:     Chief Complaint/Reason for Admission: Abdominal pain    History of Present Illness:  Patient is a 44 y.o. male with depression, PTSD, substance abuse presented to surgery clinic earlier today with RLQ pain. Onset of symptoms was abrupt starting a few days ago with worsening course since that time. He states the pain started a few days ago, and was centered in the middle of his abdomen. It is getting progressively worse. He reports nausea, vomiting and anorexia. He presented this morning to his PCP who referred him to general surgery. Patient denies fevers, chills. Symptoms are aggravated by movement and palpation. Symptoms improve with nothing. He is not on any blood thinners or steroids. He is a current smoker. He is non diabetic.  CT scan without evidence of acute appendicitis. Labs are wnl. Patient admitted for observation due significant RLQ pain.     Current Facility-Administered Medications on File Prior to Encounter   Medication    [COMPLETED] iohexol (OMNIPAQUE 350) injection 100 mL    [COMPLETED] iohexol (OMNIPAQUE 350) injection 15 mL    [COMPLETED] iohexol (OMNIPAQUE 350) injection 15 mL     Current Outpatient Medications on File Prior to Encounter   Medication Sig    ARIPiprazole lauroxil (ARISTADA) 441 mg/1.6 mL injection Inject 1.6 mLs (441 mg total) into the muscle every 28 days.    HYDROcodone-acetaminophen (NORCO)  mg per tablet Take 1 tablet by mouth every 6 (six) hours as needed for Pain.    prazosin (MINIPRESS) 1 MG Cap Take 1 capsule (1 mg total) by mouth every evening.    sertraline (ZOLOFT) 50 MG tablet Take 1 tablet (50 mg total) by mouth once  daily.    traZODone (DESYREL) 100 MG tablet Take 1 tablet (100 mg total) by mouth every evening.       Review of patient's allergies indicates:  No Known Allergies    Past Medical History:   Diagnosis Date    Acute gastritis without hemorrhage 06/03/2019    aspirin induced    Addiction to drug     [facility]    ADHD (attention deficit hyperactivity disorder)     [facility]    Anxiety     Attempted suicide 06/20/19    pt stated attempted 2 weeks ago- aspirin    Depression     Hallucination     Heavy smoker (more than 20 cigarettes per day)     History of psychiatric hospitalization     History of rectal bleeding 2012    OSH Colonoscopy without polyps.    Hx of psychiatric care     Major depression 6/24/2019    Major depressive disorder, recurrent, severe with psychotic features 6/25/2019    Migraine headache     Psychiatric problem     PTSD (post-traumatic stress disorder) 6/25/2019    Shingles 2013    Substance abuse     Suicide attempt     attempted twice    Therapy      Past Surgical History:   Procedure Laterality Date    CYST REMOVAL N/A 2013    near heart    GANGLION CYST EXCISION Right     wrist     Family History     Problem Relation (Age of Onset)    Depression Maternal Aunt    Diabetes Father    Hypertension Father    Lung cancer Father, Maternal Grandmother, Maternal Grandfather, Paternal Grandmother, Paternal Grandfather, Maternal Uncle, Maternal Uncle    Stroke Mother        Tobacco Use    Smoking status: Current Every Day Smoker     Packs/day: 1.00     Start date: 1997    Smokeless tobacco: Never Used   Substance and Sexual Activity    Alcohol use: No    Drug use: Yes     Frequency: 3.0 times per week     Types: Marijuana     Comment: last used one month ago    Sexual activity: Yes     Partners: Female     Review of Systems   Constitutional: Positive for activity change, appetite change and fatigue. Negative for fever.   HENT: Negative.    Eyes:  Negative.    Respiratory: Negative.    Cardiovascular: Negative.    Gastrointestinal: Positive for abdominal pain, constipation, nausea and vomiting.   Endocrine: Negative.    Genitourinary: Negative.    Musculoskeletal: Negative.    Skin: Negative.    Allergic/Immunologic: Negative.    Neurological: Negative.    Psychiatric/Behavioral: The patient is nervous/anxious.      Objective:     Vital Signs (Most Recent):  Temp: 96.4 °F (35.8 °C) (01/06/20 2104)  Pulse: (!) 54 (01/06/20 2104)  Resp: 18 (01/06/20 2104)  BP: 102/69 (01/06/20 2104)  SpO2: (!) 93 % (01/06/20 2104) Vital Signs (24h Range):  Temp:  [96.4 °F (35.8 °C)-98.2 °F (36.8 °C)] 96.4 °F (35.8 °C)  Pulse:  [54-96] 54  Resp:  [18] 18  SpO2:  [93 %-95 %] 93 %  BP: (102-126)/(69-82) 102/69     Weight: 88.9 kg (196 lb)  Body mass index is 30.24 kg/m².    Physical Exam   Constitutional: He is oriented to person, place, and time. He appears well-developed and well-nourished. No distress.   Appears uncomfortable   HENT:   Head: Normocephalic and atraumatic.   Eyes: EOM are normal. No scleral icterus.   Neck: Normal range of motion. Neck supple.   Cardiovascular: Normal rate and regular rhythm.   Pulmonary/Chest: Effort normal. No respiratory distress.   Abdominal:   Soft, +TTP RLQ  No rebound  Non distended   Musculoskeletal: Normal range of motion. He exhibits no edema or tenderness.   Neurological: He is alert and oriented to person, place, and time.   Skin: Skin is warm and dry.   Psychiatric: He has a normal mood and affect.       Significant Labs:  CBC:   Recent Labs   Lab 01/06/20  1209   WBC 7.78   RBC 5.52   HGB 17.1   HCT 49.7      MCV 90   MCH 31.0   MCHC 34.4     BMP:   Recent Labs   Lab 01/06/20  1209   GLU 90      K 4.1      CO2 23   BUN 14   CREATININE 1.0   CALCIUM 9.6     CMP:   Recent Labs   Lab 01/06/20  1209   GLU 90   CALCIUM 9.6   ALBUMIN 4.0   PROT 7.2      K 4.1   CO2 23      BUN 14   CREATININE 1.0   ALKPHOS  67   ALT 19   AST 19   BILITOT 1.2*       Significant Diagnostics:  I have reviewed all pertinent imaging results/findings within the past 24 hours.    Assessment/Plan:     44 y.o. male with depression, PTSD, substance abuse presents with acute onset RLQ abdominal pain     - CT without evidence of acute intra-abdominal pathology. Labs are reassuring   - Serial abdominal exams. If symptoms persist, will consider diagnostic laparoscopy.  - Hold off on abx for now given normal CT scan and labs  - NPO  - IVF  - Home medications  - SCDDAMON issa MD  General Surgery  [facility]

## 2020-01-08 NOTE — PLAN OF CARE
Future Appointments   Date Time Provider Department Center   1/23/2020  8:30 AM [facility]   2/17/2020 10:30 AM Tiffany Braga NP [facility] PSYCH [facility]        01/08/20 0848   Final Note   Assessment Type Final Discharge Note   Anticipated Discharge Disposition Home   What phone number can be called within the next 1-3 days to see how you are doing after discharge? [phone]   Hospital Follow Up  Appt(s) scheduled? Yes   Discharge plans and expectations educations in teach back method with documentation complete? Yes   Right Care Referral Info   Post Acute Recommendation No Care

## 2020-01-30 ENCOUNTER — INFUSION (OUTPATIENT)
Dept: INFUSION THERAPY | Facility: HOSPITAL | Age: 45
End: 2020-01-30
Attending: NURSE PRACTITIONER
Payer: OTHER GOVERNMENT

## 2020-01-30 VITALS
TEMPERATURE: 98 F | RESPIRATION RATE: 17 BRPM | SYSTOLIC BLOOD PRESSURE: 114 MMHG | OXYGEN SATURATION: 98 % | DIASTOLIC BLOOD PRESSURE: 70 MMHG | HEART RATE: 84 BPM

## 2020-01-30 DIAGNOSIS — R45.851 DEPRESSION WITH SUICIDAL IDEATION: Primary | ICD-10-CM

## 2020-01-30 DIAGNOSIS — F32.A DEPRESSION WITH SUICIDAL IDEATION: Primary | ICD-10-CM

## 2020-01-30 PROCEDURE — 96372 THER/PROPH/DIAG INJ SC/IM: CPT

## 2020-01-30 PROCEDURE — 63600175 PHARM REV CODE 636 W HCPCS: Mod: JG | Performed by: NURSE PRACTITIONER

## 2020-01-30 RX ADMIN — ARIPIPRAZOLE LAUROXIL 441 MG: 441 INJECTION, SUSPENSION, EXTENDED RELEASE INTRAMUSCULAR at 09:01

## 2020-01-30 NOTE — PLAN OF CARE
Pt presented for Aristada injection. VSS. No complaints/concerns voiced. Pleasant with staff. Tolerated Aristada. Future appt information reviewed with pt. Distress screening tool completed. Pt ambulated unassisted off unit. Pt in NAD at time of discharge.

## 2020-02-01 NOTE — PATIENT INSTRUCTIONS
"        You have been provided with a certain amount of medication with a specified number of refills.  Please follow up within an adequate time before you run out of medications.    REFILLS FOR CONTROLLED SUBSTANCES WILL NOT BE GIVEN WITHOUT AN APPOINTMENT.  I will not honor or fill automated refill requests from pharmacies.  You must come in for an appointment to get refills.        Please book your next appointment for myself or therapist by phone by calling our office at [phone].        Note that follow up appointments are 10-15 minutes long.  It is important that we focus on medication management.  Should you need therapy, please get set up with our therapist or call your insurance company to find out which therapists are available in your area.      PLEASE BE AT LEAST 15 MINUTES EARLY FOR YOUR NEXT APPOINTMENT.  Late arrivals WILL BE TURNED AWAY AND ASKED TO RESCHEDULE.  YOU MUST COME EARLY TO ALLOW TIME FOR CHECK-IN AS WELL AS GET YOUR VITAL SIGNS AND GO OVER YOUR MEDICATIONS.  Tardiness is not fair to the patients who present after you and are on time for their appointments.  It causes a delay in the appointments for patients and staff.  YOU MAY ALSO BE DISCHARGED FROM CLINIC with multiple late arrivals or "No Show" appointments.       -----------------------------------------------------------------------------------------------------------------  IF YOU FEEL SUICIDAL OR HAVING THOUGHTS OR PLANS TO HURT YOURSELF OR OTHERS, CALL 911 OR REPORT TO THE NEAREST EMERGENCY ROOM.  YOU CAN ALSO ACCESS THE FOLLOWING HOTLINE:    National Suicide Hotline Number 5-094-558-TALK (4719)                  "

## 2020-02-01 NOTE — PROGRESS NOTES
Outpatient Psychiatry Follow-Up Visit (MD/NP)    2/5/2020    Clinical Status of Patient:  Outpatient (Ambulatory)    Chief Complaint:  Donell Park is a 44 y.o. male who presents today for follow-up of depression, anxiety and substance problems.  Met with patient and Girlfriend.      Interval History and Content of Current Session:  Interim Events/Subjective Report/Content of Current Session: Patient Donell Park presents to clinic after transfer of care from my nurse practitioner.  I have never met him before and this is the 1st time I see him other than chart review.  He comes in in the 1st words out of his mouth or that he has been suicidal for the past month or 2 and he wants to be admitted to the hospital.  Reviewing his records, he has multiple psychiatric evaluations and admits for similar circumstances.  Of note, he is getting Aristada 441mg injections monthly (last injection 01/30/2020).  Last admit to a psychiatric unit was a couple months ago.  Says that he has untreated anxiety and depression.  Previous suicide attempts by overdose.  Started with mental health problems around age 19 and has had multiple psychiatric admits since then.  Says that he is taking Zoloft 50 mg daily, prazosin 1 mg nightly, trazodone 100 mg nightly.  He says he has been on many other medications in the past and none of them have helped.  He does not have an outpatient therapist and is not interested in doing therapy.  He does admit to continually smoking marijuana but denies any other drug use.  Also smokes and smells heavily of nicotine.    Psychotherapy:  · Target symptoms: depression, anxiety , substance abuse  · Why chosen therapy is appropriate versus another modality: relevant to diagnosis  · Outcome monitoring methods: self-report, observation  · Therapeutic intervention type: supportive psychotherapy  · Topics discussed/themes: building skills sets for symptom management, symptom recognition,  "substance abuse  · The patient's response to the intervention is accepting. The patient's progress toward treatment goals is poor.   · Duration of intervention: 15 minutes.    Review of Systems   · PSYCHIATRIC: Pertinant items are noted in the narrative.  · CONSTITUTIONAL: No weight gain or loss.   · MUSCULOSKELETAL: No pain or stiffness of the joints.  · NEUROLOGIC: No weakness, sensory changes, seizures, confusion, memory loss, tremor or other abnormal movements.  · RESPIRATORY: No shortness of breath.  · CARDIOVASCULAR: No tachycardia or chest pain.  · GASTROINTESTINAL: No nausea, vomiting, pain, constipation or diarrhea.    Past Medical, Family and Social History: The patient's past medical, family and social history have been reviewed and updated as appropriate within the electronic medical record - see encounter notes.    Compliance: yes    Side effects: None    Risk Parameters:  Patient reports no suicidal ideation  Patient reports no homicidal ideation  Patient reports no self-injurious behavior  Patient reports no violent behavior    Exam (detailed: at least 9 elements; comprehensive: all 15 elements)   Constitutional  Vitals:  Most recent vital signs, dated less than 90 days prior to this appointment, were reviewed.   Vitals:    02/05/20 1142   BP: 122/78   Pulse: 108   Weight: 92.4 kg (203 lb 9.5 oz)   Height: 5' 7.5" (1.715 m)        General:  unremarkable, age appropriate     Musculoskeletal  Muscle Strength/Tone:  no tremor, no tic   Gait & Station:  non-ataxic     Psychiatric  Speech:  no latency; no press   Mood & Affect:  anxious, depressed  blunted   Thought Process:  normal and logical   Associations:  intact   Thought Content:  Denies hallucinations; says that he feels suicidal with no specific plan but says that he may try overdosing again   Insight:  poor awareness of illness   Judgement: limited   Orientation:  person, place, situation, time/date   Memory: intact for content of interview "   Language: able to name, able to repeat   Attention Span & Concentration:  able to focus   Fund of Knowledge:  intact and appropriate to age and level of education     Assessment and Diagnosis   Status/Progress: Based on the examination today, the patient's problem(s) is/are adequately but not ideally controlled.  New problems have been presented today.   Co-morbidities are complicating management of the primary condition.  There are no active rule-out diagnoses for this patient at this time.     General Impression: We will continue pharmacological intervention and adjunctive therapy.       ICD-10-CM ICD-9-CM   1. Major depressive disorder, recurrent severe without psychotic features F33.2 296.33   2. Generalized anxiety disorder F41.1 300.02   3. Cannabis use disorder, severe, dependence F12.20 304.30   4. Personality disorder F60.9 301.9       Intervention/Counseling/Treatment Plan   · Medication Management: Continue current medications. The risks and benefits of medication were discussed with the patient.  · Counseling provided with patient as follows: importance of compliance with chosen treatment options was emphasized, risks and benefits of treatment options, including medications, were discussed with the patient, risk factor reduction, prognosis, patient education, instructions for  management, treatment and follow-up were reviewed  1.  Patient says that he has been suicidal for 1-2 months.  His girlfriend agrees to walk him to the emergency room because he is requesting admission to a psychiatric facility for these suicidal thoughts.  Says that he may consider overdosing but has no specific plan at this time.  I do get a lot of attention seeking personality characteristics in his behavior and mannerisms.  I suspect that he likes being on an inpatient psychiatric unit.  Consideration should be given to outpatient ACT follow up given his significant recidivism.  2. Continue Zoloft 50 mg po qd targeting  depression and anxiety.  Warned of risk of yesenia, suicidality, serotonin syndrome.  This can be adjusted on the inpatient unit, likely increased.  3. Continue Prazosin 1 mg po qhs for nightmares.  Warned of risk of hypotension.  4. Continue Trazodone 100 mg po qhs prn sleep and depression.  Warned of risk of yesenia, suicidality, serotonin syndrome, over-sedation, weight gain, priapism.  5. Continue Aristada 441 mg IM q month.  Last injection on 01/30/2020 with [facility] infusion unit.  6.  Attempted to educate to stop smoking marijuana and to stop smoking cigarettes but he is reluctant.  7.  Called the emergency room to let them know that his girlfriend is walking him over and he will likely warrant acute inpatient psychiatric hospitalization.  Again, inpatient psychiatric hospital should focus on more aggressive outpatient intervention and follow-up.    Return to Clinic: 6 weeks, as needed

## 2020-02-05 ENCOUNTER — HOSPITAL ENCOUNTER (EMERGENCY)
Facility: HOSPITAL | Age: 45
Discharge: PSYCHIATRIC OR SUBSTANCE USE FACILITY | End: 2020-02-05
Attending: EMERGENCY MEDICINE
Payer: OTHER GOVERNMENT

## 2020-02-05 ENCOUNTER — OFFICE VISIT (OUTPATIENT)
Dept: PSYCHIATRY | Facility: CLINIC | Age: 45
End: 2020-02-05
Payer: OTHER GOVERNMENT

## 2020-02-05 VITALS
HEART RATE: 108 BPM | DIASTOLIC BLOOD PRESSURE: 78 MMHG | BODY MASS INDEX: 30.86 KG/M2 | WEIGHT: 203.63 LBS | SYSTOLIC BLOOD PRESSURE: 122 MMHG | HEIGHT: 68 IN

## 2020-02-05 VITALS
RESPIRATION RATE: 16 BRPM | HEART RATE: 110 BPM | HEIGHT: 67 IN | WEIGHT: 180 LBS | OXYGEN SATURATION: 96 % | TEMPERATURE: 98 F | DIASTOLIC BLOOD PRESSURE: 84 MMHG | BODY MASS INDEX: 28.25 KG/M2 | SYSTOLIC BLOOD PRESSURE: 131 MMHG

## 2020-02-05 DIAGNOSIS — F41.1 GENERALIZED ANXIETY DISORDER: ICD-10-CM

## 2020-02-05 DIAGNOSIS — F41.8 DEPRESSION WITH ANXIETY: ICD-10-CM

## 2020-02-05 DIAGNOSIS — F33.2 MAJOR DEPRESSIVE DISORDER, RECURRENT SEVERE WITHOUT PSYCHOTIC FEATURES: Primary | ICD-10-CM

## 2020-02-05 DIAGNOSIS — F60.9 PERSONALITY DISORDER: ICD-10-CM

## 2020-02-05 DIAGNOSIS — F12.20 CANNABIS USE DISORDER, SEVERE, DEPENDENCE: ICD-10-CM

## 2020-02-05 DIAGNOSIS — R45.851 SUICIDAL IDEATION: Primary | ICD-10-CM

## 2020-02-05 LAB
ALBUMIN SERPL BCP-MCNC: 3.9 G/DL (ref 3.5–5.2)
ALP SERPL-CCNC: 70 U/L (ref 55–135)
ALT SERPL W/O P-5'-P-CCNC: 19 U/L (ref 10–44)
AMPHET+METHAMPHET UR QL: NEGATIVE
ANION GAP SERPL CALC-SCNC: 7 MMOL/L (ref 8–16)
APAP SERPL-MCNC: <3 UG/ML (ref 10–20)
AST SERPL-CCNC: 18 U/L (ref 10–40)
BARBITURATES UR QL SCN>200 NG/ML: NEGATIVE
BASOPHILS # BLD AUTO: 0.07 K/UL (ref 0–0.2)
BASOPHILS NFR BLD: 0.7 % (ref 0–1.9)
BENZODIAZ UR QL SCN>200 NG/ML: NEGATIVE
BILIRUB SERPL-MCNC: 0.7 MG/DL (ref 0.1–1)
BILIRUB UR QL STRIP: NEGATIVE
BUN SERPL-MCNC: 15 MG/DL (ref 6–20)
BZE UR QL SCN: NEGATIVE
CALCIUM SERPL-MCNC: 9.4 MG/DL (ref 8.7–10.5)
CANNABINOIDS UR QL SCN: NEGATIVE
CHLORIDE SERPL-SCNC: 107 MMOL/L (ref 95–110)
CLARITY UR: CLEAR
CO2 SERPL-SCNC: 23 MMOL/L (ref 23–29)
COLOR UR: YELLOW
CREAT SERPL-MCNC: 1.1 MG/DL (ref 0.5–1.4)
CREAT UR-MCNC: 123.6 MG/DL (ref 23–375)
DIFFERENTIAL METHOD: ABNORMAL
EOSINOPHIL # BLD AUTO: 0.3 K/UL (ref 0–0.5)
EOSINOPHIL NFR BLD: 2.9 % (ref 0–8)
ERYTHROCYTE [DISTWIDTH] IN BLOOD BY AUTOMATED COUNT: 13.8 % (ref 11.5–14.5)
EST. GFR  (AFRICAN AMERICAN): >60 ML/MIN/1.73 M^2
EST. GFR  (NON AFRICAN AMERICAN): >60 ML/MIN/1.73 M^2
ETHANOL SERPL-MCNC: <10 MG/DL
GLUCOSE SERPL-MCNC: 118 MG/DL (ref 70–110)
GLUCOSE UR QL STRIP: NEGATIVE
HCT VFR BLD AUTO: 47.2 % (ref 40–54)
HGB BLD-MCNC: 16.7 G/DL (ref 14–18)
HGB UR QL STRIP: NEGATIVE
IMM GRANULOCYTES # BLD AUTO: 0.03 K/UL (ref 0–0.04)
IMM GRANULOCYTES NFR BLD AUTO: 0.3 % (ref 0–0.5)
KETONES UR QL STRIP: NEGATIVE
LEUKOCYTE ESTERASE UR QL STRIP: NEGATIVE
LYMPHOCYTES # BLD AUTO: 2 K/UL (ref 1–4.8)
LYMPHOCYTES NFR BLD: 19.7 % (ref 18–48)
MCH RBC QN AUTO: 31.2 PG (ref 27–31)
MCHC RBC AUTO-ENTMCNC: 35.4 G/DL (ref 32–36)
MCV RBC AUTO: 88 FL (ref 82–98)
METHADONE UR QL SCN>300 NG/ML: NEGATIVE
MONOCYTES # BLD AUTO: 1.1 K/UL (ref 0.3–1)
MONOCYTES NFR BLD: 10.7 % (ref 4–15)
NEUTROPHILS # BLD AUTO: 6.6 K/UL (ref 1.8–7.7)
NEUTROPHILS NFR BLD: 65.7 % (ref 38–73)
NITRITE UR QL STRIP: NEGATIVE
NRBC BLD-RTO: 0 /100 WBC
OPIATES UR QL SCN: NEGATIVE
PCP UR QL SCN>25 NG/ML: NEGATIVE
PH UR STRIP: 6 [PH] (ref 5–8)
PLATELET # BLD AUTO: 269 K/UL (ref 150–350)
PMV BLD AUTO: 11 FL (ref 9.2–12.9)
POTASSIUM SERPL-SCNC: 3.9 MMOL/L (ref 3.5–5.1)
PROT SERPL-MCNC: 7.1 G/DL (ref 6–8.4)
PROT UR QL STRIP: NEGATIVE
RBC # BLD AUTO: 5.35 M/UL (ref 4.6–6.2)
SODIUM SERPL-SCNC: 137 MMOL/L (ref 136–145)
SP GR UR STRIP: 1.01 (ref 1–1.03)
TOXICOLOGY INFORMATION: NORMAL
TSH SERPL DL<=0.005 MIU/L-ACNC: 1.9 UIU/ML (ref 0.4–4)
URN SPEC COLLECT METH UR: NORMAL
UROBILINOGEN UR STRIP-ACNC: NEGATIVE EU/DL
WBC # BLD AUTO: 10.1 K/UL (ref 3.9–12.7)

## 2020-02-05 PROCEDURE — 99999 PR PBB SHADOW E&M-EST. PATIENT-LVL III: CPT | Mod: PBBFAC,,, | Performed by: PSYCHIATRY & NEUROLOGY

## 2020-02-05 PROCEDURE — 99285 EMERGENCY DEPT VISIT HI MDM: CPT | Mod: 25,27

## 2020-02-05 PROCEDURE — 63600175 PHARM REV CODE 636 W HCPCS: Performed by: EMERGENCY MEDICINE

## 2020-02-05 PROCEDURE — 99213 OFFICE O/P EST LOW 20 MIN: CPT | Mod: PBBFAC | Performed by: PSYCHIATRY & NEUROLOGY

## 2020-02-05 PROCEDURE — 99999 PR PBB SHADOW E&M-EST. PATIENT-LVL III: ICD-10-PCS | Mod: PBBFAC,,, | Performed by: PSYCHIATRY & NEUROLOGY

## 2020-02-05 PROCEDURE — 80307 DRUG TEST PRSMV CHEM ANLYZR: CPT

## 2020-02-05 PROCEDURE — 85025 COMPLETE CBC W/AUTO DIFF WBC: CPT

## 2020-02-05 PROCEDURE — 25000003 PHARM REV CODE 250: Performed by: EMERGENCY MEDICINE

## 2020-02-05 PROCEDURE — 81003 URINALYSIS AUTO W/O SCOPE: CPT | Mod: 59

## 2020-02-05 PROCEDURE — 80053 COMPREHEN METABOLIC PANEL: CPT

## 2020-02-05 PROCEDURE — 96372 THER/PROPH/DIAG INJ SC/IM: CPT

## 2020-02-05 PROCEDURE — 99213 PR OFFICE/OUTPT VISIT, EST, LEVL III, 20-29 MIN: ICD-10-PCS | Mod: S$PBB,,, | Performed by: PSYCHIATRY & NEUROLOGY

## 2020-02-05 PROCEDURE — 84443 ASSAY THYROID STIM HORMONE: CPT

## 2020-02-05 PROCEDURE — 99213 OFFICE O/P EST LOW 20 MIN: CPT | Mod: S$PBB,,, | Performed by: PSYCHIATRY & NEUROLOGY

## 2020-02-05 PROCEDURE — 80320 DRUG SCREEN QUANTALCOHOLS: CPT

## 2020-02-05 PROCEDURE — 80329 ANALGESICS NON-OPIOID 1 OR 2: CPT

## 2020-02-05 RX ORDER — LORAZEPAM 2 MG/ML
3 INJECTION INTRAMUSCULAR
Status: COMPLETED | OUTPATIENT
Start: 2020-02-05 | End: 2020-02-05

## 2020-02-05 RX ORDER — LORAZEPAM 0.5 MG/1
3 TABLET ORAL
Status: COMPLETED | OUTPATIENT
Start: 2020-02-05 | End: 2020-02-05

## 2020-02-05 RX ADMIN — LORAZEPAM 3 MG: 0.5 TABLET ORAL at 04:02

## 2020-02-05 RX ADMIN — LORAZEPAM 3 MG: 2 INJECTION INTRAMUSCULAR; INTRAVENOUS at 12:02

## 2020-02-05 NOTE — ED TRIAGE NOTES
Pt arrived to the ED via POV from home with c/o SI. Pt reports for the past x3 weeks been feeling SI. Plan will be taking pills. Also repprts hearing voices that commands him to kill himself. On admit to ED bed, anxious appearing. Dr. Cunningham made aware. Previous SI attempt.

## 2020-02-05 NOTE — ED PROVIDER NOTES
Encounter Date: 2/5/2020    SCRIBE #1 NOTE: I, Naveen Freeman, am scribing for, and in the presence of,  Alonzo Cunningham MD. I have scribed the following portions of the note - Other sections scribed: HPI, ROS.       History     Chief Complaint   Patient presents with    Suicidal     pt report feeling suicidal. pt reports his plan is to OB on pills. girlfriend report pt has been increasingly depressed over the past 3 weeks.      This 44 y.o M with a hx of Addiction to drug, Attempted suicide, Hallucination, Psychiatric hospitalization, Major depressive disorder, recurrent, severe with psychotic features, Migraine headache and PTSD presents to the ED c/o SI with a plan and increased depression x3 weeks. The pt reports her planned to overdose on pills. The pt was sent to the ED from his psychiatrist appointment (Hiro Hall MD). He is compliant with his prescribed Zoloft and Abilify. He does smoke cigarettes. He denies illicit drug use. He also denies HI, fever, chills, headache, sore throat, chest pain, abdominal pain, cough, dysuria, abdominal pain, rash and any other associated symptoms. No prior tx.     The history is provided by the patient.     Review of patient's allergies indicates:  No Known Allergies  Past Medical History:   Diagnosis Date    Acute gastritis without hemorrhage 06/03/2019    aspirin induced    Addiction to drug     [facility]    ADHD (attention deficit hyperactivity disorder)     [facility]    Anxiety     Attempted suicide 06/20/19    pt stated attempted 2 weeks ago- aspirin    Depression     Hallucination     Heavy smoker (more than 20 cigarettes per day)     History of psychiatric hospitalization     History of rectal bleeding 2012    [facility] Colonoscopy without polyps.    Hx of psychiatric care     Major depression 6/24/2019    Major depressive disorder, recurrent, severe with psychotic features 6/25/2019    Migraine headache     Psychiatric  problem     PTSD (post-traumatic stress disorder) 6/25/2019    Shingles 2013    Substance abuse     Suicide attempt     attempted twice    Therapy      Past Surgical History:   Procedure Laterality Date    CYST REMOVAL N/A 2013    near heart    GANGLION CYST EXCISION Right     wrist     Family History   Problem Relation Age of Onset    Stroke Mother     Diabetes Father     Hypertension Father     Lung cancer Father     Lung cancer Maternal Grandmother     Lung cancer Maternal Grandfather     Lung cancer Paternal Grandmother     Lung cancer Paternal Grandfather     Lung cancer Maternal Uncle     Lung cancer Maternal Uncle     Depression Maternal Aunt      Social History     Tobacco Use    Smoking status: Current Every Day Smoker     Packs/day: 1.00     Start date: 1997    Smokeless tobacco: Never Used   Substance Use Topics    Alcohol use: No    Drug use: Yes     Frequency: 3.0 times per week     Types: Marijuana     Comment: last used one month ago     Review of Systems   Constitutional: Negative for chills and fever.   HENT: Negative for congestion, ear pain, rhinorrhea and sore throat.    Eyes: Negative for pain and visual disturbance.   Respiratory: Negative for cough and shortness of breath.    Cardiovascular: Negative for chest pain.   Gastrointestinal: Negative for abdominal pain, diarrhea, nausea and vomiting.   Genitourinary: Negative for dysuria.   Musculoskeletal: Negative for back pain and neck pain.   Skin: Negative for rash.   Neurological: Negative for headaches.   Psychiatric/Behavioral: Positive for dysphoric mood and suicidal ideas.        (-) HI       Physical Exam     Initial Vitals [02/05/20 1211]   BP Pulse Resp Temp SpO2   130/85 109 18 98.3 °F (36.8 °C) 98 %      MAP       --         Physical Exam  The patient was examined specifically for the following:   General:No significant distress, Good color, Warm and dry. Head and neck:Scalp atraumatic, Neck supple.  Neurological:Appropriate conversation, Gross motor deficits. Eyes:Conjugate gaze, Clear corneas. ENT: No epistaxis. Cardiac: Regular rate and rhythm, Grossly normal heart tones. Pulmonary: Wheezing, Rales. Gastrointestinal: Abdominal tenderness, Abdominal distention. Musculoskeletal: Extremity deformity, Apparent pain with range of motion of the joints. Skin: Rash.   The findings on examination were normal except for the following:  The patient is extremely anxious.  He is suicidal.  ED Course   Procedures  Labs Reviewed   CBC W/ AUTO DIFFERENTIAL - Abnormal; Notable for the following components:       Result Value    Mean Corpuscular Hemoglobin 31.2 (*)     Mono # 1.1 (*)     All other components within normal limits   COMPREHENSIVE METABOLIC PANEL - Abnormal; Notable for the following components:    Glucose 118 (*)     Anion Gap 7 (*)     All other components within normal limits   ACETAMINOPHEN LEVEL - Abnormal; Notable for the following components:    Acetaminophen (Tylenol), Serum <3.0 (*)     All other components within normal limits   TSH   URINALYSIS, REFLEX TO URINE CULTURE    Narrative:     Preferred Collection Type->Urine, Clean Catch   DRUG SCREEN PANEL, URINE EMERGENCY    Narrative:     Preferred Collection Type->Urine, Clean Catch   ALCOHOL,MEDICAL (ETHANOL)          Imaging Results    None      Medical decision making:  This patient presented to his psychiatrist this morning depressed and suicidal.  He was sent to the emergency room for placement.  I conducted a medical screening examination. The patient is free of any immediate emergency.  He is stable for admission to a psychiatric facility.    This patient is medically cleared for admission to a psychiatric facility P                Scribe Attestation:   Scribe #1: I performed the above scribed service and the documentation accurately describes the services I performed. I attest to the accuracy of the note.                          Clinical  Impression:       ICD-10-CM ICD-9-CM   1. Suicidal ideation R45.851 V62.84   2. Depression with anxiety F41.8 300.4            I personally performed the services described in this documentation.  All medical record  entries made by the scribe are at my direction and in my presence.  Signed, Dr. Octavio Cunningham MD  02/05/20 5446

## 2020-02-05 NOTE — CONSULTS
Please see my office note earlier today for his evaluation.  He was not seen by me in the ED as a consult.    1.  Patient says that he has been suicidal for 1-2 months.  His girlfriend agrees to walk him to the emergency room because he is requesting admission to a psychiatric facility for these suicidal thoughts.  Says that he may consider overdosing but has no specific plan at this time.  I do get a lot of attention seeking personality characteristics in his behavior and mannerisms.  I suspect that he likes being on an inpatient psychiatric unit.  Consideration should be given to outpatient ACT follow up given his significant recidivism.  2. Continue Zoloft 50 mg po qd targeting depression and anxiety.  Warned of risk of yesenia, suicidality, serotonin syndrome.  This can be adjusted on the inpatient unit, likely increased.  3. Continue Prazosin 1 mg po qhs for nightmares.  Warned of risk of hypotension.  4. Continue Trazodone 100 mg po qhs prn sleep and depression.  Warned of risk of yesenia, suicidality, serotonin syndrome, over-sedation, weight gain, priapism.  5. Continue Aristada 441 mg IM q month.  Last injection on 01/30/2020 with [facility] infusion unit.  6.  Attempted to educate to stop smoking marijuana and to stop smoking cigarettes but he is reluctant.  7.  Called the emergency room to let them know that his girlfriend is walking him over and he will likely warrant acute inpatient psychiatric hospitalization.  Again, inpatient psychiatric hospital should focus on more aggressive outpatient intervention and follow-up.        Clyde Hall M.D., FAPA  Section Head, Psychiatry,[facility]  Senior Lecturer, [facility]

## 2020-02-21 ENCOUNTER — OFFICE VISIT (OUTPATIENT)
Dept: INTERNAL MEDICINE | Facility: CLINIC | Age: 45
End: 2020-02-21
Payer: OTHER GOVERNMENT

## 2020-02-21 VITALS
OXYGEN SATURATION: 97 % | HEIGHT: 67 IN | DIASTOLIC BLOOD PRESSURE: 68 MMHG | SYSTOLIC BLOOD PRESSURE: 132 MMHG | WEIGHT: 207 LBS | HEART RATE: 94 BPM | BODY MASS INDEX: 32.49 KG/M2

## 2020-02-21 DIAGNOSIS — D22.9 MULTIPLE NEVI: ICD-10-CM

## 2020-02-21 DIAGNOSIS — F17.210 HEAVY SMOKER (MORE THAN 20 CIGARETTES PER DAY): ICD-10-CM

## 2020-02-21 DIAGNOSIS — F41.9 ANXIETY: ICD-10-CM

## 2020-02-21 DIAGNOSIS — F33.1 MODERATE EPISODE OF RECURRENT MAJOR DEPRESSIVE DISORDER: Primary | ICD-10-CM

## 2020-02-21 DIAGNOSIS — R03.0 ELEVATED BLOOD PRESSURE READING IN OFFICE WITHOUT DIAGNOSIS OF HYPERTENSION: ICD-10-CM

## 2020-02-21 DIAGNOSIS — G47.09 OTHER INSOMNIA: ICD-10-CM

## 2020-02-21 DIAGNOSIS — R68.82 DECREASED LIBIDO: ICD-10-CM

## 2020-02-21 DIAGNOSIS — R06.83 SNORING: ICD-10-CM

## 2020-02-21 PROBLEM — T50.902A SUICIDE ATTEMPT BY DRUG INGESTION: Status: RESOLVED | Noted: 2019-09-16 | Resolved: 2020-02-21

## 2020-02-21 PROBLEM — F17.200 NICOTINE USE DISORDER: Status: RESOLVED | Noted: 2019-09-19 | Resolved: 2020-02-21

## 2020-02-21 PROBLEM — H93.13 TINNITUS OF BOTH EARS: Status: RESOLVED | Noted: 2019-09-16 | Resolved: 2020-02-21

## 2020-02-21 PROBLEM — T65.92XA SUICIDE ATTEMPT BY SUBSTANCE OVERDOSE: Status: RESOLVED | Noted: 2019-09-16 | Resolved: 2020-02-21

## 2020-02-21 PROBLEM — F33.9 EPISODE OF RECURRENT MAJOR DEPRESSIVE DISORDER: Status: ACTIVE | Noted: 2018-11-19

## 2020-02-21 PROBLEM — H60.501 ACUTE OTITIS EXTERNA OF RIGHT EAR: Status: RESOLVED | Noted: 2019-07-08 | Resolved: 2020-02-21

## 2020-02-21 PROCEDURE — 99999 PR PBB SHADOW E&M-EST. PATIENT-LVL III: ICD-10-PCS | Mod: PBBFAC,,, | Performed by: INTERNAL MEDICINE

## 2020-02-21 PROCEDURE — 99213 OFFICE O/P EST LOW 20 MIN: CPT | Mod: PBBFAC | Performed by: INTERNAL MEDICINE

## 2020-02-21 PROCEDURE — 99999 PR PBB SHADOW E&M-EST. PATIENT-LVL III: CPT | Mod: PBBFAC,,, | Performed by: INTERNAL MEDICINE

## 2020-02-21 PROCEDURE — 99406 PR TOBACCO USE CESSATION INTERMEDIATE 3-10 MINUTES: ICD-10-PCS | Mod: S$PBB,,, | Performed by: INTERNAL MEDICINE

## 2020-02-21 PROCEDURE — 99215 PR OFFICE/OUTPT VISIT, EST, LEVL V, 40-54 MIN: ICD-10-PCS | Mod: S$PBB,,, | Performed by: INTERNAL MEDICINE

## 2020-02-21 PROCEDURE — 99406 BEHAV CHNG SMOKING 3-10 MIN: CPT | Mod: S$PBB,,, | Performed by: INTERNAL MEDICINE

## 2020-02-21 PROCEDURE — 99215 OFFICE O/P EST HI 40 MIN: CPT | Mod: S$PBB,,, | Performed by: INTERNAL MEDICINE

## 2020-02-21 RX ORDER — HYDROXYZINE PAMOATE 25 MG/1
25 CAPSULE ORAL 3 TIMES DAILY
COMMUNITY
Start: 2020-02-12 | End: 2020-04-06 | Stop reason: SDUPTHER

## 2020-02-21 RX ORDER — GABAPENTIN 300 MG/1
300 CAPSULE ORAL 3 TIMES DAILY
COMMUNITY
End: 2020-04-06 | Stop reason: SDUPTHER

## 2020-02-21 RX ORDER — ARIPIPRAZOLE 10 MG/1
10 TABLET ORAL EVERY MORNING
COMMUNITY
End: 2020-04-06 | Stop reason: SDUPTHER

## 2020-02-21 RX ORDER — FLUOXETINE 10 MG/1
30 TABLET ORAL
COMMUNITY
Start: 2020-02-12 | End: 2020-04-06 | Stop reason: SDUPTHER

## 2020-02-21 RX ORDER — ARIPIPRAZOLE 10 MG/1
TABLET ORAL
COMMUNITY
Start: 2020-02-12 | End: 2020-02-21 | Stop reason: SDUPTHER

## 2020-02-21 RX ORDER — MIRTAZAPINE 30 MG/1
30 TABLET, FILM COATED ORAL NIGHTLY
COMMUNITY
Start: 2020-02-12 | End: 2020-04-06 | Stop reason: SDUPTHER
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
## 2020-03-04 ENCOUNTER — LAB VISIT (OUTPATIENT)
Dept: LAB | Facility: OTHER | Age: 45
End: 2020-03-04
Attending: INTERNAL MEDICINE
Payer: OTHER GOVERNMENT

## 2020-03-04 DIAGNOSIS — R68.82 DECREASED LIBIDO: ICD-10-CM

## 2020-03-04 LAB — TESTOST SERPL-MCNC: 419 NG/DL (ref 304–1227)

## 2020-03-04 PROCEDURE — 36415 COLL VENOUS BLD VENIPUNCTURE: CPT

## 2020-03-04 PROCEDURE — 84403 ASSAY OF TOTAL TESTOSTERONE: CPT

## 2020-03-09 ENCOUNTER — TELEPHONE (OUTPATIENT)
Dept: INTERNAL MEDICINE | Facility: CLINIC | Age: 45
End: 2020-03-09

## 2020-03-09 NOTE — TELEPHONE ENCOUNTER
"Informed pt that the covering provider would like for him to know "  his testosterone level appears to be in a normal range.  Dr. Resendiz will contact him next week when she returns."  Pt verbalized understanding   "

## 2020-03-09 NOTE — TELEPHONE ENCOUNTER
----- Message from Patrick Jules, Patient Care Assistant sent at 3/9/2020  2:40 PM CDT -----  Contact: ADAIR CHANG [[id number]]  Name of Who is Calling: ADAIR CHANG [[id number]]    What is the request in detail: Requesting a call back in regards of results.  Please contact to further discuss and advise      Can the clinic reply by [facility]: No    [facility]   [phone]

## 2020-03-09 NOTE — TELEPHONE ENCOUNTER
I am covering for Dr. Resendiz while she is out.  Please contact the patient and inform that his testosterone level appears to be in a normal range.  Dr. Resendiz will contact him next week when she returns.    thanks

## 2020-03-17 NOTE — TELEPHONE ENCOUNTER
Spoke with the Pt and told him MD advise   testosterone level is normal and to discuss potential s/e with psychiatrist

## 2020-03-18 ENCOUNTER — TELEPHONE (OUTPATIENT)
Dept: PSYCHIATRY | Facility: CLINIC | Age: 45
End: 2020-03-18

## 2020-04-06 ENCOUNTER — TELEPHONE (OUTPATIENT)
Dept: PSYCHIATRY | Facility: HOSPITAL | Age: 45
End: 2020-04-06

## 2020-04-06 RX ORDER — GABAPENTIN 300 MG/1
300 CAPSULE ORAL 3 TIMES DAILY
Qty: 270 CAPSULE | Refills: 0 | Status: SHIPPED | OUTPATIENT
Start: 2020-04-06 | End: 2020-06-05 | Stop reason: SDUPTHER

## 2020-04-06 RX ORDER — MIRTAZAPINE 30 MG/1
30 TABLET, FILM COATED ORAL NIGHTLY
Qty: 90 TABLET | Refills: 0 | Status: SHIPPED | OUTPATIENT
Start: 2020-04-06 | End: 2020-12-04

## 2020-04-06 RX ORDER — HYDROXYZINE PAMOATE 25 MG/1
25 CAPSULE ORAL 3 TIMES DAILY PRN
Qty: 270 CAPSULE | Refills: 0 | Status: SHIPPED | OUTPATIENT
Start: 2020-04-06 | End: 2020-12-04

## 2020-04-06 RX ORDER — ARIPIPRAZOLE 10 MG/1
10 TABLET ORAL EVERY MORNING
Qty: 90 TABLET | Refills: 0 | Status: SHIPPED | OUTPATIENT
Start: 2020-04-06 | End: 2020-06-05 | Stop reason: SDUPTHER

## 2020-04-06 RX ORDER — FLUOXETINE 10 MG/1
30 TABLET ORAL DAILY
Qty: 270 TABLET | Refills: 0 | Status: SHIPPED | OUTPATIENT
Start: 2020-04-06 | End: 2020-06-05 | Stop reason: SDUPTHER

## 2020-04-06 NOTE — TELEPHONE ENCOUNTER
Called and confirmed with patient which medicines he needed refills of.  Refill sent for 3 months to his pharmacy.

## 2020-04-06 NOTE — TELEPHONE ENCOUNTER
----- Message from Angelica Kaba MA sent at 4/6/2020  8:14 AM CDT -----  Contact: Patient  Patient missed appointment with you on March 19th, but scheduled follow up for June 5th. He said that he needs refills for all of his Rxs.

## 2020-04-08 ENCOUNTER — RESEARCH ENCOUNTER (OUTPATIENT)
Dept: RESEARCH | Facility: HOSPITAL | Age: 45
End: 2020-04-08

## 2020-04-08 ENCOUNTER — OFFICE VISIT (OUTPATIENT)
Dept: URGENT CARE | Facility: CLINIC | Age: 45
End: 2020-04-08
Payer: OTHER GOVERNMENT

## 2020-04-08 ENCOUNTER — LAB VISIT (OUTPATIENT)
Dept: URGENT CARE | Facility: CLINIC | Age: 45
End: 2020-04-08
Payer: OTHER GOVERNMENT

## 2020-04-08 VITALS
HEIGHT: 67 IN | TEMPERATURE: 101 F | BODY MASS INDEX: 32.49 KG/M2 | OXYGEN SATURATION: 95 % | WEIGHT: 207 LBS | HEART RATE: 110 BPM

## 2020-04-08 DIAGNOSIS — Z00.6 EXAMINATION OF PARTICIPANT IN CLINICAL TRIAL: ICD-10-CM

## 2020-04-08 DIAGNOSIS — Z20.822 EXPOSURE TO COVID-19 VIRUS: ICD-10-CM

## 2020-04-08 DIAGNOSIS — R50.9 FEVER, UNSPECIFIED FEVER CAUSE: ICD-10-CM

## 2020-04-08 DIAGNOSIS — U07.1 COVID-19: Primary | ICD-10-CM

## 2020-04-08 DIAGNOSIS — Z20.822 EXPOSURE TO COVID-19 VIRUS: Primary | ICD-10-CM

## 2020-04-08 PROCEDURE — 99204 OFFICE O/P NEW MOD 45 MIN: CPT | Mod: S$GLB,,, | Performed by: NURSE PRACTITIONER

## 2020-04-08 PROCEDURE — U0002 COVID-19 LAB TEST NON-CDC: HCPCS

## 2020-04-08 PROCEDURE — 99204 PR OFFICE/OUTPT VISIT, NEW, LEVL IV, 45-59 MIN: ICD-10-PCS | Mod: S$GLB,,, | Performed by: NURSE PRACTITIONER

## 2020-04-08 RX ORDER — AZITHROMYCIN 250 MG/1
TABLET, FILM COATED ORAL
Qty: 6 TABLET | Refills: 0 | Status: SHIPPED | OUTPATIENT
Start: 2020-04-08 | End: 2020-06-05 | Stop reason: ALTCHOICE

## 2020-04-08 RX ORDER — PROMETHAZINE HYDROCHLORIDE AND DEXTROMETHORPHAN HYDROBROMIDE 6.25; 15 MG/5ML; MG/5ML
5 SYRUP ORAL
Qty: 118 ML | Refills: 0 | Status: SHIPPED | OUTPATIENT
Start: 2020-04-08 | End: 2020-04-18

## 2020-04-08 RX ORDER — BENZONATATE 200 MG/1
200 CAPSULE ORAL 3 TIMES DAILY PRN
Qty: 20 CAPSULE | Refills: 0 | Status: SHIPPED | OUTPATIENT
Start: 2020-04-08 | End: 2020-04-18

## 2020-04-08 RX ORDER — ALBUTEROL SULFATE 90 UG/1
2 AEROSOL, METERED RESPIRATORY (INHALATION) EVERY 6 HOURS PRN
Qty: 6.7 G | Refills: 0 | Status: SHIPPED | OUTPATIENT
Start: 2020-04-08 | End: 2020-08-21

## 2020-04-08 NOTE — PATIENT INSTRUCTIONS
Urgent Care Management:  - Treatment plan discussed.  - PCP recommendations given.  - Return precautions advised.  - Patient agrees with and understands plan of care.    Patient Instructions, Education, Teaching and Summary of Visit:      RETURN TO CLINIC IF SYMPTOMS WORSEN OR CALL 911 IMMEDIATELY FOR SHORTNESS OF BREATH, CHEST PAIN, DIZZINESS, WORSENING PAIN, NAUSEA AND VOMITING, HEART PALPITATIONS, FEVER AND/OR NECK STIFFNESS. FOLLOW UP WITH PRIMARY CARE PROVIDER IN THE AM.    -Diagnosis and treatment plan discussed with patient.  -Patient agreed with my treatment plan.  -Patient will follow up with primary care provider or Specialty Provider, as discussed.     -If you were prescribed a narcotic or controlled medication, do not drive or operate heavy equipment or machinery while taking these medications.  -You must understand that you've received an Urgent Care treatment only and that you may be released before all your medical problems are known or treated.   -You, the patient, will arrange for follow up care as instructed.  -Follow up with your PCP or specialty clinic as directed in the next 1-2 weeks if not improved or as needed.    -You can call [phone] to schedule an appointment with the appropriate provider.  -If your condition worsens we recommend that you receive another evaluation at the emergency room immediately or contact your primary medical clinics after hours call service to discuss your concerns.  -Please return here or go to the Emergency Department for any concerns or worsening of condition.Instructions for Patients Awaiting COVID-19 Test Results    Test results should be available within 7 days.    You can contact your care team via the [facility] patient portal or the regular phone number to check on testing status.    Your results will be made available to your provider and to our Infection Control Team. As soon as results are available, we will contact you. [facility] will not be  releasing results to the [facility] portal until your provider reviews them.     Please continue infection control precautions like covering your mouth when coughing, washing hands frequently and minimizing contact with others whenever possible. We recommend that you stay home while you are awaiting test results, if possible. Consider wearing a mask if you need to go out in public, until result is known.     Please call [facility] anywhere care if need to be seen again.     Urgent Care Management:  - Treatment plan discussed.  - PCP recommendations given.  - Return precautions advised.  - Patient agrees with and understands plan of care.    Patient Instructions, Education, Teaching and Summary of Visit:      RETURN TO CLINIC IF SYMPTOMS WORSEN OR CALL 911 IMMEDIATELY FOR SHORTNESS OF BREATH, CHEST PAIN, DIZZINESS, WORSENING PAIN, NAUSEA AND VOMITING, HEART PALPITATIONS, FEVER AND/OR NECK STIFFNESS. FOLLOW UP WITH PRIMARY CARE PROVIDER IN THE AM.    -Diagnosis and treatment plan discussed with patient.  -Patient agreed with my treatment plan.  -Patient will follow up with primary care provider or Specialty Provider, as discussed.     -If you were prescribed a narcotic or controlled medication, do not drive or operate heavy equipment or machinery while taking these medications.  -You must understand that you've received an Urgent Care treatment only and that you may be released before all your medical problems are known or treated.   -You, the patient, will arrange for follow up care as instructed.  -Follow up with your PCP or specialty clinic as directed in the next 1-2 weeks if not improved or as needed.    -You can call [phone] to schedule an appointment with the appropriate provider.  -If your condition worsens we recommend that you receive another evaluation at the emergency room immediately or contact your primary medical clinics after hours call service to discuss your concerns.  -Please return here or go to  the Emergency Department for any concerns or worsening of condition.

## 2020-04-08 NOTE — PROGRESS NOTES
"Subjective:       Patient ID: Donell Park is a 44 y.o. male.    Vitals:  height is 5' 7" (1.702 m) and weight is 93.9 kg (207 lb).     Chief Complaint: Fever    Patient presents with c.o fever, body aches, and sob. Onset of sxs two days. 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 Pt ambulating without difficulty, communicates without difficulty. Pt is with his gf.     Fever    This is a new problem. Episode onset: 2 days. The problem occurs intermittently. The problem has been unchanged. His temperature was unmeasured prior to arrival. Associated symptoms include nausea and a sore throat. Pertinent negatives include no congestion, coughing, ear pain, rash, vomiting or wheezing. He has tried nothing for the symptoms.       Constitution: Positive for chills, fatigue and fever. Negative for sweating.   HENT: Positive for sore throat. Negative for ear pain, congestion, sinus pain, sinus pressure and voice change.    Neck: Positive for painful lymph nodes.   Eyes: Negative for eye redness.   Respiratory: Positive for shortness of breath. Negative for chest tightness, cough, sputum production, bloody sputum, COPD, stridor, wheezing and asthma.    Gastrointestinal: Positive for nausea. Negative for vomiting.   Musculoskeletal: Positive for muscle ache.   Skin: Negative for rash.   Allergic/Immunologic: Negative for seasonal allergies and asthma.   Hematologic/Lymphatic: Positive for swollen lymph nodes.       Objective:      Physical Exam   Constitutional: He is oriented to person, place, and time. He appears well-developed and well-nourished. He is cooperative.  Non-toxic appearance. He has a sickly appearance. He does not appear ill. No distress.   HENT:   Head: Normocephalic and atraumatic.   Right Ear: Hearing, tympanic membrane, external ear and ear canal normal.   Left Ear: Hearing, tympanic membrane, external ear and ear canal normal.   Nose: Nose normal. No mucosal edema, " rhinorrhea or nasal deformity. No epistaxis. Right sinus exhibits no maxillary sinus tenderness and no frontal sinus tenderness. Left sinus exhibits no maxillary sinus tenderness and no frontal sinus tenderness.   Mouth/Throat: Uvula is midline, oropharynx is clear and moist and mucous membranes are normal. No trismus in the jaw. Normal dentition. No uvula swelling. No oropharyngeal exudate, posterior oropharyngeal edema or posterior oropharyngeal erythema.   Eyes: Conjunctivae and lids are normal. No scleral icterus.   Neck: Trachea normal, full passive range of motion without pain and phonation normal. Neck supple. No neck rigidity. No edema and no erythema present.   Cardiovascular: Normal rate, regular rhythm, normal heart sounds, intact distal pulses and normal pulses.   Pulmonary/Chest: Effort normal. No respiratory distress. He has decreased breath sounds. He has no rhonchi.   Abdominal: Normal appearance.   Musculoskeletal: Normal range of motion. He exhibits no edema or deformity.   Neurological: He is alert and oriented to person, place, and time. He exhibits normal muscle tone. Coordination normal.   Pt communicating without difficulty, speaks clearly and answers appropriately with unlabored breathing. No neuro deficits noted.      Skin: Skin is warm, dry, intact, not diaphoretic and not pale.   Psychiatric: He has a normal mood and affect. His speech is normal and behavior is normal. Judgment and thought content normal. Cognition and memory are normal.   Nursing note and vitals reviewed.        Assessment:       1. Exposure to Covid-19 Virus    2. Fever, unspecified fever cause        Plan:         Exposure to Covid-19 Virus  -     SARS- CoV-2 (COVID-19) QUALITATIVE PCR; Future; Expected date: 04/08/2020  -     albuterol (PROVENTIL/VENTOLIN HFA) 90 mcg/actuation inhaler; Inhale 2 puffs into the lungs every 6 (six) hours as needed for Wheezing. Cough and Shortness of Breath. Rescue  Dispense: 6.7 g;  Refill: 0  -     promethazine-dextromethorphan (PROMETHAZINE-DM) 6.25-15 mg/5 mL Syrp; Take 5 mLs by mouth every 4 to 6 hours as needed.  Dispense: 118 mL; Refill: 0  -     benzonatate (TESSALON) 200 MG capsule; Take 1 capsule (200 mg total) by mouth 3 (three) times daily as needed for Cough.  Dispense: 20 capsule; Refill: 0  -     azithromycin (ZITHROMAX Z-EZIO) 250 MG tablet; Take 2 tablets (500 mg) on  Day 1,  followed by 1 tablet (250 mg) once daily on Days 2 through 5.  Dispense: 6 tablet; Refill: 0    Fever, unspecified fever cause  -     SARS- CoV-2 (COVID-19) QUALITATIVE PCR; Future; Expected date: 04/08/2020  -     albuterol (PROVENTIL/VENTOLIN HFA) 90 mcg/actuation inhaler; Inhale 2 puffs into the lungs every 6 (six) hours as needed for Wheezing. Cough and Shortness of Breath. Rescue  Dispense: 6.7 g; Refill: 0  -     promethazine-dextromethorphan (PROMETHAZINE-DM) 6.25-15 mg/5 mL Syrp; Take 5 mLs by mouth every 4 to 6 hours as needed.  Dispense: 118 mL; Refill: 0  -     benzonatate (TESSALON) 200 MG capsule; Take 1 capsule (200 mg total) by mouth 3 (three) times daily as needed for Cough.  Dispense: 20 capsule; Refill: 0  -     azithromycin (ZITHROMAX Z-EZOI) 250 MG tablet; Take 2 tablets (500 mg) on  Day 1,  followed by 1 tablet (250 mg) once daily on Days 2 through 5.  Dispense: 6 tablet; Refill: 0    Other orders  -     COVID-19 Home Symptom Monitoring  - Duration (days): 14

## 2020-04-09 ENCOUNTER — TELEPHONE (OUTPATIENT)
Dept: URGENT CARE | Facility: CLINIC | Age: 45
End: 2020-04-09

## 2020-04-09 LAB — SARS-COV-2 RNA RESP QL NAA+PROBE: NOT DETECTED

## 2020-04-09 NOTE — TELEPHONE ENCOUNTER
Relayed to pt that his Covid test was negative. Pt states he is feeling better. Advised pt to be 24hrs fever free with antipyretics before being around family and to adhere to all social distancing rules currently in place.

## 2020-04-15 NOTE — PROGRESS NOTES
The Patient  was called today regarding the patient's participation in (IRB #[id number] PI: Angelito).   The Verbal Informed Consent was read and discussed by the consenter. The following was discussed:   Types of specimens to be collected   All medical information released to researchers will be stripped of identifiers and no patient information will be given to anyone outside of this research project.    Participating in a research study is not the same as getting regular medical care and will not improve the patient's health. The purpose of a research study is to gather information.  Being in this study does not interfere with your regular medical care.   The patient/LAR does not have to participate in this study. If they do not join, their care at [facility] will not be affected.  The person granting permission was provided adequate time to ask questions regarding the scope and purpose of the study.  Permission was obtained by telephone.   The above statements were read by the person obtaining permission to the person granting permission and witnessed by Terese Johnson RN, who also confirmed the patient's consent to the study. The witness information was documented on the verbal consent form as well.  This Verbal Informed Consent process was conducted prior to initiation of any study procedures.

## 2020-05-08 ENCOUNTER — PATIENT OUTREACH (OUTPATIENT)
Dept: ADMINISTRATIVE | Facility: OTHER | Age: 45
End: 2020-05-08

## 2020-06-03 NOTE — PATIENT INSTRUCTIONS
"        You have been provided with a certain amount of medication with a specified number of refills.  Please follow up within an adequate time before you run out of medications.    REFILLS FOR CONTROLLED SUBSTANCES WILL NOT BE GIVEN WITHOUT AN APPOINTMENT.  I will not honor or fill automated refill requests from pharmacies.  You must come in for an appointment to get refills.        Please book your next appointment for myself or therapist by phone by calling our office at [phone].        Note that follow up appointments are 10-15 minutes long.  It is important that we focus on medication management.  Should you need therapy, please get set up with our therapist or call your insurance company to find out which therapists are available in your area.      PLEASE BE AT LEAST 15 MINUTES EARLY FOR YOUR NEXT APPOINTMENT.  Late arrivals WILL BE TURNED AWAY AND ASKED TO RESCHEDULE.  YOU MUST COME EARLY TO ALLOW TIME FOR CHECK-IN AS WELL AS GET YOUR VITAL SIGNS AND GO OVER YOUR MEDICATIONS.  [facility] is not fair to the patients who present after you and are on time for their appointments.  It causes a delay in the appointments for patients and staff.  YOU MAY ALSO BE DISCHARGED FROM CLINIC with multiple late arrivals or "No Show" appointments.       -----------------------------------------------------------------------------------------------------------------  IF YOU FEEL SUICIDAL OR HAVING THOUGHTS OR PLANS TO HURT YOURSELF OR OTHERS, CALL 911 OR REPORT TO THE NEAREST EMERGENCY ROOM.  YOU CAN ALSO ACCESS THE FOLLOWING HOTLINE:    National Suicide Hotline Number 1-532-201-TALK (0265)                  "

## 2020-06-03 NOTE — PROGRESS NOTES
Outpatient Psychiatry Follow-Up Visit (MD/NP)    6/5/2020    Clinical Status of Patient:  Outpatient (Ambulatory)    Chief Complaint:  Donell Park is a 44 y.o. male who presents today for follow-up of depression, anxiety and substance problems.  Met with patient.      Interval History and Content of Current Session:  Interim Events/Subjective Report/Content of Current Session: Patient Donell Park presents to clinic.  He was admitted to [facility] and says that he did not have a good time.  He did not enjoy the setting he is not sure of what medication changes they gave him at that time.  He feels much better since discharge.  He feels as if his mood is in better shape.  Sleeping well at night without mirtazapine not having any nightmares.  Also not having to utilize hydroxyzine as often.  No medical problems.  Working doing air conditioning jobs.  Asking to continue his current medicines.  Review of records show that I did a refill for him after he was discharged from that facility.  I will refill those medicines.    Psychotherapy:  · Target symptoms: depression, anxiety , substance abuse  · Why chosen therapy is appropriate versus another modality: relevant to diagnosis  · Outcome monitoring methods: self-report, observation  · Therapeutic intervention type: supportive psychotherapy  · Topics discussed/themes: building skills sets for symptom management, symptom recognition, substance abuse  · The patient's response to the intervention is accepting. The patient's progress toward treatment goals is poor.   · Duration of intervention: 15 minutes.    Review of Systems   · PSYCHIATRIC: Pertinant items are noted in the narrative.  · CONSTITUTIONAL: No weight gain or loss.   · MUSCULOSKELETAL: No pain or stiffness of the joints.  · NEUROLOGIC: No weakness, sensory changes, seizures, confusion, memory loss, tremor or other abnormal movements.  · RESPIRATORY: No shortness of  "breath.  · CARDIOVASCULAR: No tachycardia or chest pain.  · GASTROINTESTINAL: No nausea, vomiting, pain, constipation or diarrhea.    Past Medical, Family and Social History: The patient's past medical, family and social history have been reviewed and updated as appropriate within the electronic medical record - see encounter notes.    Compliance: yes    Side effects: None    Risk Parameters:  Patient reports no suicidal ideation  Patient reports no homicidal ideation  Patient reports no self-injurious behavior  Patient reports no violent behavior    Exam (detailed: at least 9 elements; comprehensive: all 15 elements)   Constitutional  Vitals:  Most recent vital signs, dated less than 90 days prior to this appointment, were reviewed.   Vitals:    06/05/20 0819   BP: 110/62   Pulse: 78   Temp: 98.4 °F (36.9 °C)   TempSrc: Oral   Weight: 94.7 kg (208 lb 12.4 oz)   Height: 5' 7" (1.702 m)        General:  unremarkable, age appropriate     Musculoskeletal  Muscle Strength/Tone:  no tremor, no tic   Gait & Station:  non-ataxic     Psychiatric  Speech:  no latency; no press   Mood & Affect:  euthymic  blunted   Thought Process:  normal and logical   Associations:  intact   Thought Content:  normal, no suicidality, no homicidality, delusions, or paranoia   Insight:  poor awareness of illness   Judgement: limited   Orientation:  person, place, situation, time/date   Memory: intact for content of interview   Language: able to name, able to repeat   Attention Span & Concentration:  able to focus   Fund of Knowledge:  intact and appropriate to age and level of education     Assessment and Diagnosis   Status/Progress: Based on the examination today, the patient's problem(s) is/are adequately but not ideally controlled.  New problems have been presented today.   Co-morbidities are complicating management of the primary condition.  There are no active rule-out diagnoses for this patient at this time.     General Impression: We " will continue pharmacological intervention and adjunctive therapy.       ICD-10-CM ICD-9-CM   1. Major depressive disorder, recurrent episode, in partial remission F33.41 296.35   2. Generalized anxiety disorder F41.1 300.02   3. Cannabis use disorder, severe, dependence F12.20 304.30   4. Personality disorder F60.9 301.9   5. Insomnia, unspecified type G47.00 780.52       Intervention/Counseling/Treatment Plan   · Medication Management: Continue current medications. The risks and benefits of medication were discussed with the patient.  · Counseling provided with patient as follows: importance of compliance with chosen treatment options was emphasized, risks and benefits of treatment options, including medications, were discussed with the patient, risk factor reduction, prognosis, patient education, instructions for  management, treatment and follow-up were reviewed  1.  Continue Prozac 30 mg daily targeting depression and anxiety.  Warned of risk of yesenia, suicidality, serotonin syndrome.  2.  Continue Abilify 10 mg daily targeting mood stabilization.  Warned of risk of TD, EPS, metabolic syndrome, akathisia.  3.  Continue gabapentin 300 mg p.o. t.i.d. targeting mood stabilization.  Warned of risk of over-sedation.  4.  Continue p.r.n. use of hydroxyzine 25 mg p.o. t.i.d. p.r.n. anxiety.  Warned of risk of over-sedation.  5.  Continue mirtazapine 30 mg nightly p.r.n. insomnia.  Warned of risk of yesenia, suicidality, serotonin syndrome, over-sedation, weight gain.  6.  Educated patient to refrain from alcohol and drug use.    Return to Clinic: 3 months, as needed

## 2020-06-05 ENCOUNTER — OFFICE VISIT (OUTPATIENT)
Dept: PSYCHIATRY | Facility: CLINIC | Age: 45
End: 2020-06-05
Payer: OTHER GOVERNMENT

## 2020-06-05 VITALS
DIASTOLIC BLOOD PRESSURE: 62 MMHG | WEIGHT: 208.75 LBS | HEART RATE: 78 BPM | BODY MASS INDEX: 32.76 KG/M2 | HEIGHT: 67 IN | TEMPERATURE: 98 F | SYSTOLIC BLOOD PRESSURE: 110 MMHG

## 2020-06-05 DIAGNOSIS — F33.41 MAJOR DEPRESSIVE DISORDER, RECURRENT EPISODE, IN PARTIAL REMISSION: Primary | ICD-10-CM

## 2020-06-05 DIAGNOSIS — F60.9 PERSONALITY DISORDER: ICD-10-CM

## 2020-06-05 DIAGNOSIS — F12.20 CANNABIS USE DISORDER, SEVERE, DEPENDENCE: ICD-10-CM

## 2020-06-05 DIAGNOSIS — F41.1 GENERALIZED ANXIETY DISORDER: ICD-10-CM

## 2020-06-05 DIAGNOSIS — G47.00 INSOMNIA, UNSPECIFIED TYPE: ICD-10-CM

## 2020-06-05 PROCEDURE — 99213 OFFICE O/P EST LOW 20 MIN: CPT | Mod: S$PBB,,, | Performed by: PSYCHIATRY & NEUROLOGY

## 2020-06-05 PROCEDURE — 99999 PR PBB SHADOW E&M-EST. PATIENT-LVL III: CPT | Mod: PBBFAC,,, | Performed by: PSYCHIATRY & NEUROLOGY

## 2020-06-05 PROCEDURE — 99999 PR PBB SHADOW E&M-EST. PATIENT-LVL III: ICD-10-PCS | Mod: PBBFAC,,, | Performed by: PSYCHIATRY & NEUROLOGY

## 2020-06-05 PROCEDURE — 99213 PR OFFICE/OUTPT VISIT, EST, LEVL III, 20-29 MIN: ICD-10-PCS | Mod: S$PBB,,, | Performed by: PSYCHIATRY & NEUROLOGY

## 2020-06-05 PROCEDURE — 99213 OFFICE O/P EST LOW 20 MIN: CPT | Mod: PBBFAC | Performed by: PSYCHIATRY & NEUROLOGY

## 2020-06-05 RX ORDER — FLUOXETINE 10 MG/1
30 TABLET ORAL DAILY
Qty: 270 TABLET | Refills: 1 | Status: SHIPPED | OUTPATIENT
Start: 2020-06-05 | End: 2020-12-04

## 2020-06-05 RX ORDER — ARIPIPRAZOLE 10 MG/1
10 TABLET ORAL EVERY MORNING
Qty: 90 TABLET | Refills: 1 | Status: SHIPPED | OUTPATIENT
Start: 2020-06-05 | End: 2020-12-04

## 2020-06-05 RX ORDER — GABAPENTIN 300 MG/1
300 CAPSULE ORAL 3 TIMES DAILY
Qty: 270 CAPSULE | Refills: 1 | Status: SHIPPED | OUTPATIENT
Start: 2020-06-05 | End: 2020-12-04

## 2020-07-26 ENCOUNTER — HOSPITAL ENCOUNTER (EMERGENCY)
Facility: HOSPITAL | Age: 45
Discharge: HOME | End: 2020-07-26
Attending: EMERGENCY MEDICINE
Payer: OTHER GOVERNMENT

## 2020-07-26 VITALS
HEART RATE: 63 BPM | WEIGHT: 200 LBS | SYSTOLIC BLOOD PRESSURE: 117 MMHG | RESPIRATION RATE: 18 BRPM | TEMPERATURE: 98 F | OXYGEN SATURATION: 98 % | DIASTOLIC BLOOD PRESSURE: 76 MMHG | HEIGHT: 67 IN | BODY MASS INDEX: 31.39 KG/M2

## 2020-07-26 DIAGNOSIS — R07.9 CHEST PAIN: Primary | ICD-10-CM

## 2020-07-26 LAB
ALBUMIN SERPL BCP-MCNC: 3.7 G/DL (ref 3.5–5.2)
ALP SERPL-CCNC: 66 U/L (ref 55–135)
ALT SERPL W/O P-5'-P-CCNC: 17 U/L (ref 10–44)
ANION GAP SERPL CALC-SCNC: 7 MMOL/L (ref 8–16)
AST SERPL-CCNC: 17 U/L (ref 10–40)
BASOPHILS # BLD AUTO: 0.06 K/UL (ref 0–0.2)
BASOPHILS NFR BLD: 0.6 % (ref 0–1.9)
BILIRUB SERPL-MCNC: 0.6 MG/DL (ref 0.1–1)
BUN SERPL-MCNC: 16 MG/DL (ref 6–20)
CALCIUM SERPL-MCNC: 9.2 MG/DL (ref 8.7–10.5)
CHLORIDE SERPL-SCNC: 111 MMOL/L (ref 95–110)
CO2 SERPL-SCNC: 25 MMOL/L (ref 23–29)
CREAT SERPL-MCNC: 1.2 MG/DL (ref 0.5–1.4)
DIFFERENTIAL METHOD: ABNORMAL
EOSINOPHIL # BLD AUTO: 0.4 K/UL (ref 0–0.5)
EOSINOPHIL NFR BLD: 4 % (ref 0–8)
ERYTHROCYTE [DISTWIDTH] IN BLOOD BY AUTOMATED COUNT: 14.1 % (ref 11.5–14.5)
EST. GFR  (AFRICAN AMERICAN): >60 ML/MIN/1.73 M^2
EST. GFR  (NON AFRICAN AMERICAN): >60 ML/MIN/1.73 M^2
GLUCOSE SERPL-MCNC: 83 MG/DL (ref 70–110)
HCT VFR BLD AUTO: 46.8 % (ref 40–54)
HGB BLD-MCNC: 16.5 G/DL (ref 14–18)
IMM GRANULOCYTES # BLD AUTO: 0.03 K/UL (ref 0–0.04)
IMM GRANULOCYTES NFR BLD AUTO: 0.3 % (ref 0–0.5)
LYMPHOCYTES # BLD AUTO: 1.9 K/UL (ref 1–4.8)
LYMPHOCYTES NFR BLD: 19 % (ref 18–48)
MCH RBC QN AUTO: 31.7 PG (ref 27–31)
MCHC RBC AUTO-ENTMCNC: 35.3 G/DL (ref 32–36)
MCV RBC AUTO: 90 FL (ref 82–98)
MONOCYTES # BLD AUTO: 1.2 K/UL (ref 0.3–1)
MONOCYTES NFR BLD: 11.9 % (ref 4–15)
NEUTROPHILS # BLD AUTO: 6.4 K/UL (ref 1.8–7.7)
NEUTROPHILS NFR BLD: 64.2 % (ref 38–73)
NRBC BLD-RTO: 0 /100 WBC
PLATELET # BLD AUTO: 277 K/UL (ref 150–350)
PMV BLD AUTO: 11.9 FL (ref 9.2–12.9)
POTASSIUM SERPL-SCNC: 4.1 MMOL/L (ref 3.5–5.1)
PROT SERPL-MCNC: 6.9 G/DL (ref 6–8.4)
RBC # BLD AUTO: 5.2 M/UL (ref 4.6–6.2)
SODIUM SERPL-SCNC: 143 MMOL/L (ref 136–145)
TROPONIN I SERPL DL<=0.01 NG/ML-MCNC: <0.006 NG/ML (ref 0–0.03)
WBC # BLD AUTO: 9.94 K/UL (ref 3.9–12.7)

## 2020-07-26 PROCEDURE — 99285 EMERGENCY DEPT VISIT HI MDM: CPT | Mod: ,,, | Performed by: EMERGENCY MEDICINE

## 2020-07-26 PROCEDURE — 99285 EMERGENCY DEPT VISIT HI MDM: CPT | Mod: 25

## 2020-07-26 PROCEDURE — 85025 COMPLETE CBC W/AUTO DIFF WBC: CPT

## 2020-07-26 PROCEDURE — 25000003 PHARM REV CODE 250: Performed by: EMERGENCY MEDICINE

## 2020-07-26 PROCEDURE — 84484 ASSAY OF TROPONIN QUANT: CPT

## 2020-07-26 PROCEDURE — 93005 ELECTROCARDIOGRAM TRACING: CPT

## 2020-07-26 PROCEDURE — 93010 EKG 12-LEAD: ICD-10-PCS | Mod: ,,, | Performed by: INTERNAL MEDICINE

## 2020-07-26 PROCEDURE — 80053 COMPREHEN METABOLIC PANEL: CPT

## 2020-07-26 PROCEDURE — 93010 ELECTROCARDIOGRAM REPORT: CPT | Mod: ,,, | Performed by: INTERNAL MEDICINE

## 2020-07-26 PROCEDURE — 99285 PR EMERGENCY DEPT VISIT,LEVEL V: ICD-10-PCS | Mod: ,,, | Performed by: EMERGENCY MEDICINE

## 2020-07-26 RX ORDER — NAPROXEN 500 MG/1
500 TABLET ORAL
Status: COMPLETED | OUTPATIENT
Start: 2020-07-26 | End: 2020-07-26

## 2020-07-26 RX ORDER — ACETAMINOPHEN 500 MG
1000 TABLET ORAL
Status: COMPLETED | OUTPATIENT
Start: 2020-07-26 | End: 2020-07-26

## 2020-07-26 RX ORDER — NAPROXEN 500 MG/1
500 TABLET ORAL 2 TIMES DAILY WITH MEALS
Qty: 30 TABLET | Refills: 0 | Status: SHIPPED | OUTPATIENT
Start: 2020-07-26 | End: 2020-08-21

## 2020-07-26 RX ADMIN — ACETAMINOPHEN 1000 MG: 500 TABLET ORAL at 04:07

## 2020-07-26 RX ADMIN — NAPROXEN 500 MG: 500 TABLET ORAL at 04:07

## 2020-07-26 NOTE — DISCHARGE INSTRUCTIONS
Do not take motrin/ibuprofen/advil.  Take naproxen as ordered.  May add tylenol over the counter as directed on packaging.    Our goal in the emergency department is to always give you outstanding care and exceptional service. You may receive a survey by mail or e-mail in the next week regarding your experience in our ED. We would greatly appreciate your completing and returning the survey. Your feedback provides us with a way to recognize our staff who give very good care and it helps us learn how to improve when your experience was below our aspiration of excellence.

## 2020-07-26 NOTE — ED PROVIDER NOTES
Encounter Date: 7/26/2020       History     Chief Complaint   Patient presents with    Chest Pain     denies cardiac hx, constant since yest     43 yo M presents with CP since last night.  Feels different from his previous bouts of heartburn.  No known CAD.  Fell and suffered a chest wall trauma several days ago.  He has not taken any over the counter medications.  Patient denies nausea, vomiting, diarrhea, fever, cough, shortness of breath, abdominal pain, or dysuria.  A ten point review of systems was completed and is negative except as documented above.  Patient denies any other acute medical complaint.  The patients available PMH, PSH, Social History, medications, allergies, and triage vital signs were reviewed just prior to their medical evaluation.        Review of patient's allergies indicates:  No Known Allergies  Past Medical History:   Diagnosis Date    Acute gastritis without hemorrhage 06/03/2019    aspirin induced    Addiction to drug     [facility]    ADHD (attention deficit hyperactivity disorder)     a [facility] ago [facility]    Anxiety     Attempted suicide 06/20/19    pt stated attempted 2 weeks ago- aspirin    Depression     Hallucination     Heavy smoker (more than 20 cigarettes per day)     History of psychiatric hospitalization     History of rectal bleeding 2012    [facility]H Colonoscopy without polyps.    Hx of psychiatric care     Major depression 6/24/2019    Major depressive disorder, recurrent, severe with psychotic features 6/25/2019    Migraine headache     Psychiatric problem     PTSD (post-traumatic stress disorder) 6/25/2019    Shingles 2013    Substance abuse     Suicide attempt     attempted twice    Suicide attempt by drug ingestion 9/16/2019    Suicide attempt by substance overdose 9/16/2019    Therapy      Past Surgical History:   Procedure Laterality Date    CYST REMOVAL N/A 2013    near heart    GANGLION CYST EXCISION Right     wrist     Family  History   Problem Relation Age of Onset    Stroke Mother     Diabetes Father     Hypertension Father     Lung cancer Father     Lung cancer Maternal Grandmother     Lung cancer Maternal Grandfather     Lung cancer Paternal Grandmother     Lung cancer Paternal Grandfather     Lung cancer Maternal Uncle     Lung cancer Maternal Uncle     Depression Maternal Aunt      Social History     Tobacco Use    Smoking status: Current Every Day Smoker     Packs/day: 1.00     Start date: 1997    Smokeless tobacco: Never Used   Substance Use Topics    Alcohol use: No    Drug use: Yes     Frequency: 3.0 times per week     Types: Marijuana     Comment: last used one month ago     Review of Systems   Constitutional: Negative for fever.   HENT: Negative for sore throat.    Eyes: Negative for visual disturbance.   Respiratory: Negative for cough and shortness of breath.    Cardiovascular: Positive for chest pain.   Gastrointestinal: Negative for abdominal pain, diarrhea, nausea and vomiting.   Genitourinary: Negative for dysuria.   Musculoskeletal: Negative for neck pain.   Skin: Negative for rash and wound.   Allergic/Immunologic: Negative for immunocompromised state.   Neurological: Negative for syncope.   Psychiatric/Behavioral: Negative for confusion.       Physical Exam     Initial Vitals [07/26/20 1453]   BP Pulse Resp Temp SpO2   130/82 95 18 98.4 °F (36.9 °C) 99 %      MAP       --         Physical Exam    Nursing note and vitals reviewed.  Constitutional: He appears well-developed and well-nourished. He is not diaphoretic. No distress.   HENT:   Head: Normocephalic and atraumatic.   Nose: Nose normal.   Eyes: Conjunctivae are normal. Right eye exhibits no discharge. Left eye exhibits no discharge.   Neck: Normal range of motion. Neck supple.   Cardiovascular: Normal rate, regular rhythm, normal heart sounds and intact distal pulses. Exam reveals no gallop and no friction rub.    No murmur  heard.  Pulmonary/Chest: Breath sounds normal. No respiratory distress. He has no wheezes. He has no rhonchi. He has no rales. He exhibits tenderness.   Abdominal: Soft. He exhibits no distension. There is no abdominal tenderness. There is no rebound and no guarding.   Musculoskeletal: Normal range of motion. No tenderness or edema.   Neurological: He is alert and oriented to person, place, and time. He has normal strength. GCS score is 15. GCS eye subscore is 4. GCS verbal subscore is 5. GCS motor subscore is 6.   Skin: Skin is warm and dry. No rash noted. No erythema.   Psychiatric: He has a normal mood and affect. His behavior is normal. Judgment and thought content normal.         ED Course   Procedures  Labs Reviewed   CBC W/ AUTO DIFFERENTIAL - Abnormal; Notable for the following components:       Result Value    Mean Corpuscular Hemoglobin 31.7 (*)     Mono # 1.2 (*)     All other components within normal limits   COMPREHENSIVE METABOLIC PANEL - Abnormal; Notable for the following components:    Chloride 111 (*)     Anion Gap 7 (*)     All other components within normal limits   TROPONIN I     EKG Readings: (Independently Interpreted)   Initial Reading: No STEMI. Rhythm: Normal Sinus Rhythm. Heart Rate: 84. Ectopy: No Ectopy. Conduction: Normal. ST Segments: Normal ST Segments. T Waves: Normal. Clinical Impression: Normal Sinus Rhythm       Imaging Results          X-Ray Chest PA And Lateral (Final result)  Result time 07/26/20 16:52:27    Final result by Yony Aldana MD (07/26/20 16:52:27)                 Impression:      1. No acute cardiopulmonary process, hypoventilatory exam.      Electronically signed by: Yony Aldana MD  Date:    07/26/2020  Time:    16:52             Narrative:    EXAMINATION:  XR CHEST PA AND LATERAL    CLINICAL HISTORY:  Chest Pain;    TECHNIQUE:  PA and lateral views of the chest were performed.    COMPARISON:  09/16/2019    FINDINGS:  The cardiomediastinal silhouette is  not enlarged.  There is no pleural effusion.  The trachea is midline.  The lungs are symmetrically expanded bilaterally without evidence of acute parenchymal process. No large focal consolidation seen.  There is no pneumothorax.  The osseous structures are remarkable for degenerative changes..                                 Medical Decision Making:   History:   Old Medical Records: I decided to obtain old medical records.  Independently Interpreted Test(s):   I have ordered and independently interpreted EKG Reading(s) - see prior notes  Clinical Tests:   Lab Tests: Ordered and Reviewed  Radiological Study: Ordered and Reviewed  Medical Tests: Ordered and Reviewed  ED Management:  43 yo M presents with CP.  Vitals normal.  PE with ttp to chest wall on left.  ECG normal.  CXR unremarkable.  Labs unremarkable including troponin x 1.  No indication for repeat troponin testing given duration of symptoms.  Suspect MSK etiology.  Doubt ACS, PE, dissection, PNX, PNA, or tamponade.  Will discharge with naproxen and close outpatient f/up.  He will call his PCP tomorrow to arrange.  Patient will return to ED for worsening symptoms, inability to eat/drink, fever greater than 100.4, or any other concerns.  Did bedside teaching with return precautions.  All questions answered.  The patient acknowledges understanding.  Gave verbal discharge instructions.                                 Clinical Impression:   Final diagnoses:  [R07.9] Chest pain (Primary)      Disposition:   Disposition: Discharged  Condition: Stable     ED Disposition Condition    Discharge Stable        ED Prescriptions     Medication Sig Dispense Start Date End Date Auth. Provider    naproxen (NAPROSYN) 500 MG tablet Take 1 tablet (500 mg total) by mouth 2 (two) times daily with meals. 30 tablet 7/26/2020  Santana Nieto MD        Follow-up Information     Follow up With Specialties Details Why Contact Info    Follow up with primary physician as soon as  possible.  Call tomorrow for an appointment.        [facility]  Return to ED for worsening symptoms, inability to eat/drink, fever greater than 100.4, or any other concerns. [address]  [facility] [address] [phone]                    Level of Complexity:  High, level 5.                 Santana Nieto MD  07/26/20 4574       Santana Nieto MD  07/28/20 5346

## 2020-07-26 NOTE — ED NOTES
Patient identifiers verified and correct for Donell Park.    LOC: The patient is awake, alert and aware of environment with an appropriate affect, the patient is oriented x 3 and speaking appropriately.  APPEARANCE: Patient resting comfortably and in no acute distress, patient is clean and well groomed, patient's clothing is properly fastened.  SKIN: The skin is warm and dry, color consistent with ethnicity, patient has normal skin turgor and moist mucus membranes, skin intact, no breakdown or bruising noted.  MUSCULOSKELETAL: Patient moving all extremities spontaneously, no obvious swelling or deformities noted. Pt experiencing mild midsternal chest pain.   RESPIRATORY: Airway is open and patent, respirations are spontaneous, patient has a normal effort and rate, no accessory muscle use noted, bilateral breath sounds noted. Pt experiencing mild SOB with exertion.   CARDIAC: Patient has a normal rate and regular rhythm, no periphreal edema noted, capillary refill < 3 seconds.  ABDOMEN: Soft and non tender to palpation, no distention noted, normoactive bowel sounds present in all four quadrants.  NEUROLOGIC: PERRL, 5mm bilaterally, eyes open spontaneously, behavior appropriate to situation, follows commands, facial expression symmetrical, bilateral hand grasp equal and even, purposeful motor response noted, normal sensation in all extremities when touched with a finger.

## 2020-08-21 ENCOUNTER — TELEPHONE (OUTPATIENT)
Dept: INTERNAL MEDICINE | Facility: CLINIC | Age: 45
End: 2020-08-21

## 2020-08-21 ENCOUNTER — OFFICE VISIT (OUTPATIENT)
Dept: INTERNAL MEDICINE | Facility: CLINIC | Age: 45
End: 2020-08-21
Payer: OTHER GOVERNMENT

## 2020-08-21 ENCOUNTER — HOSPITAL ENCOUNTER (OUTPATIENT)
Dept: RADIOLOGY | Facility: OTHER | Age: 45
Discharge: HOME | End: 2020-08-21
Attending: INTERNAL MEDICINE
Payer: OTHER GOVERNMENT

## 2020-08-21 VITALS
BODY MASS INDEX: 32.15 KG/M2 | HEART RATE: 75 BPM | WEIGHT: 204.81 LBS | DIASTOLIC BLOOD PRESSURE: 80 MMHG | HEIGHT: 67 IN | SYSTOLIC BLOOD PRESSURE: 118 MMHG

## 2020-08-21 DIAGNOSIS — M54.50 ACUTE MIDLINE LOW BACK PAIN WITHOUT SCIATICA: Primary | ICD-10-CM

## 2020-08-21 DIAGNOSIS — D22.9 MULTIPLE NEVI: ICD-10-CM

## 2020-08-21 DIAGNOSIS — Z28.21 REFUSES TETANUS, DIPHTHERIA, AND ACELLULAR PERTUSSIS (TDAP) VACCINATION: ICD-10-CM

## 2020-08-21 DIAGNOSIS — F33.1 MODERATE EPISODE OF RECURRENT MAJOR DEPRESSIVE DISORDER: ICD-10-CM

## 2020-08-21 DIAGNOSIS — Z28.21 PNEUMOCOCCAL VACCINE REFUSED: ICD-10-CM

## 2020-08-21 DIAGNOSIS — F17.210 MODERATE SMOKER (20 OR LESS PER DAY): ICD-10-CM

## 2020-08-21 DIAGNOSIS — M54.50 ACUTE MIDLINE LOW BACK PAIN WITHOUT SCIATICA: ICD-10-CM

## 2020-08-21 DIAGNOSIS — R03.0 ELEVATED BLOOD PRESSURE READING IN OFFICE WITHOUT DIAGNOSIS OF HYPERTENSION: ICD-10-CM

## 2020-08-21 PROCEDURE — 99215 OFFICE O/P EST HI 40 MIN: CPT | Mod: S$PBB,,, | Performed by: INTERNAL MEDICINE

## 2020-08-21 PROCEDURE — 72100 X-RAY EXAM L-S SPINE 2/3 VWS: CPT | Mod: 26,,, | Performed by: RADIOLOGY

## 2020-08-21 PROCEDURE — 99215 PR OFFICE/OUTPT VISIT, EST, LEVL V, 40-54 MIN: ICD-10-PCS | Mod: S$PBB,,, | Performed by: INTERNAL MEDICINE

## 2020-08-21 PROCEDURE — 99215 OFFICE O/P EST HI 40 MIN: CPT | Mod: PBBFAC,25 | Performed by: INTERNAL MEDICINE

## 2020-08-21 PROCEDURE — 99406 PR TOBACCO USE CESSATION INTERMEDIATE 3-10 MINUTES: ICD-10-PCS | Mod: S$PBB,,, | Performed by: INTERNAL MEDICINE

## 2020-08-21 PROCEDURE — 99999 PR PBB SHADOW E&M-EST. PATIENT-LVL V: CPT | Mod: PBBFAC,,, | Performed by: INTERNAL MEDICINE

## 2020-08-21 PROCEDURE — 72100 X-RAY EXAM L-S SPINE 2/3 VWS: CPT | Mod: TC,FY

## 2020-08-21 PROCEDURE — 99999 PR PBB SHADOW E&M-EST. PATIENT-LVL V: ICD-10-PCS | Mod: PBBFAC,,, | Performed by: INTERNAL MEDICINE

## 2020-08-21 PROCEDURE — 99406 BEHAV CHNG SMOKING 3-10 MIN: CPT | Mod: S$PBB,,, | Performed by: INTERNAL MEDICINE

## 2020-08-21 PROCEDURE — 72100 XR LUMBAR SPINE AP AND LATERAL: ICD-10-PCS | Mod: 26,,, | Performed by: RADIOLOGY

## 2020-08-21 RX ORDER — MELOXICAM 7.5 MG/1
7.5 TABLET ORAL DAILY
Qty: 7 TABLET | Refills: 0 | Status: SHIPPED | OUTPATIENT
Start: 2020-08-21 | End: 2020-08-28

## 2020-08-21 RX ORDER — CYCLOBENZAPRINE HCL 5 MG
5 TABLET ORAL 3 TIMES DAILY PRN
Qty: 30 TABLET | Refills: 2 | Status: SHIPPED | OUTPATIENT
Start: 2020-08-21 | End: 2020-12-04

## 2020-08-21 NOTE — TELEPHONE ENCOUNTER
Faxed patient Dermatology referral  Tsering Lagunas MD  [address] [address]  Phone: [phone]  Fax: [phone]
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
## 2020-12-01 ENCOUNTER — PATIENT OUTREACH (OUTPATIENT)
Dept: ADMINISTRATIVE | Facility: OTHER | Age: 45
End: 2020-12-01

## 2020-12-01 NOTE — PROGRESS NOTES
Health Maintenance Due   Topic Date Due    Influenza Vaccine (1) 08/01/2020     Updates were requested from care everywhere.  Chart was reviewed for overdue Proactive [facility] Encounters ([facility]) topics (CRS, Breast Cancer Screening, Eye exam)  Health Maintenance has been updated.  LINKS immunization registry triggered.  Immunizations were reconciled.

## 2020-12-03 NOTE — PROGRESS NOTES
Subjective:      Patient ID: Donell Park is a 45 y.o. male.    Chief Complaint: Low-back Pain (fall 3 months ago)    Mr Park is a 46 yo male sent in consultation by Dr. Resenidz for evaluation of low back pain after a fall.  He fell down some stairs.  He does not know exactly how he fell.  He did not have any LOC.  The pain is from the shoulder blades down to waist in the middle of the spine.  The pain is a sharp and throbbing pain.  The pain is constant.  The pain is worse with standing, bending and lifting, it will wake him up at night.  The pain is better with sitting, and he will take is mother in laws tramadol.  The pain is 6/10 now, worst 8/10 yesterday getting up from bed, best 2/10 sitting in comfortable chair.  He will occasionally take advil.  He takes 8 advil a day, and occasionally tramadol.  He is not taking flexeril.  He is not taking gabapentin because not seeing psychiatrist.  He is not doing PT.  He has not been to chiropractor    X-ray lumbar 8/2020  Five vertebrae of lumbar configuration are demonstrated.  These vertebral bodies are normal in height and are in lordotic alignment.  There is unremarkable radiographic appearance of the lumbar disc spaces.  On the AP view, the sacroiliac joints are symmetric and unremarkable in appearance and the psoas shadows appear sharply outlined.     Impression:     Unremarkable three views of the lumbar spine.    MRI lumbar 2014  No comparison MRI.  The distal spinal cord is normal in signal.  The the lumbar vertebral bodies are normal in alignment and height.  there is no disk space narrowing although the L4-5 disk is minimally desiccated.  The paravertebral structures are   unremarkable.     There is no evidence of disk herniation, spinal canal or foraminal stenosis.  IMPRESSION:    No cause for the patient's symptomatology is demonstrated.    Past Medical History:  06/03/2019: Acute gastritis without hemorrhage      Comment:  aspirin induced  No  date: Addiction to drug      Comment:  [facility]  No date: ADHD (attention deficit hyperactivity disorder)      Comment:  a few years ago at [facility]  No date: Anxiety  06/20/19: Attempted suicide      Comment:  pt stated attempted 2 weeks ago- aspirin  No date: Depression  No date: Hallucination  No date: Heavy smoker (more than 20 cigarettes per day)  No date: History of psychiatric hospitalization  2012: History of rectal bleeding      Comment:  OSH Colonoscopy without polyps.  No date: Hx of psychiatric care  6/24/2019: Major depression  6/25/2019: Major depressive disorder, recurrent, severe with   psychotic features  No date: Migraine headache  No date: Psychiatric problem  6/25/2019: PTSD (post-traumatic stress disorder)  2013: Shingles  No date: Substance abuse  No date: Suicide attempt      Comment:  attempted twice  9/16/2019: Suicide attempt by drug ingestion  9/16/2019: Suicide attempt by substance overdose  No date: Therapy    Past Surgical History:  2013: CYST REMOVAL; N/A      Comment:  near heart  No date: GANGLION CYST EXCISION; Right      Comment:  wrist    Review of patient's family history indicates:  Problem: Stroke      Relation: Mother          Age of Onset: (Not Specified)  Problem: Diabetes      Relation: Father          Age of Onset: (Not Specified)  Problem: Hypertension      Relation: Father          Age of Onset: (Not Specified)  Problem: Lung cancer      Relation: Father          Age of Onset: (Not Specified)  Problem: Lung cancer      Relation: Maternal Grandmother          Age of Onset: (Not Specified)  Problem: Lung cancer      Relation: Maternal Grandfather          Age of Onset: (Not Specified)  Problem: Lung cancer      Relation: Paternal Grandmother          Age of Onset: (Not Specified)  Problem: Lung cancer      Relation: Paternal Grandfather          Age of Onset: (Not Specified)  Problem: Lung cancer      Relation: Maternal Uncle          Age of Onset: (Not  Specified)  Problem: Lung cancer      Relation: Maternal Uncle          Age of Onset: (Not Specified)  Problem: Depression      Relation: Maternal Aunt          Age of Onset: (Not Specified)      Social History    Socioeconomic History      Marital status:       Spouse name: Not on file      Number of children: Not on file      Years of education: Not on file      Highest education level: Not on file    Occupational History      Occupation: house remodeling    Social Needs      Financial resource strain: Not on file      Food insecurity        Worry: Not on file        Inability: Not on file      Transportation needs        Medical: Not on file        Non-medical: Not on file    Tobacco Use      Smoking status: Current Every Day Smoker        Packs/day: 1.00        Start date: 1997      Smokeless tobacco: Never Used    Substance and Sexual Activity      Alcohol use: No      Drug use: Yes        Frequency: 3.0 times per week        Types: Marijuana        Comment: last used one month ago      Sexual activity: Yes        Partners: Female    Lifestyle      Physical activity        Days per week: Not on file        Minutes per session: Not on file      Stress: Not on file    Relationships      Social connections        Talks on phone: Not on file        Gets together: Not on file        Attends Yazidi service: Not on file        Active member of club or organization: Not on file        Attends meetings of clubs or organizations: Not on file        Relationship status: Not on file    Other Topics      Concerns:        Patient feels they ought to cut down on drinking/drug use: No        Patient annoyed by others criticizing their drinking/drug use: No        Patient has felt bad or guilty about drinking/drug use: No        Patient has had a drink/used drugs as an eye opener in the AM: No    Social History Narrative      Lives with ex brother in law and ex sister in law.      Unable to see his children as much as  he would like.      Has a girlfriend.      Current Outpatient Medications:  ARIPiprazole (ABILIFY) 10 MG Tab, Take 1 tablet (10 mg total) by mouth every morning. (Patient not taking: Reported on 8/21/2020), Disp: 90 tablet, Rfl: 1  cyclobenzaprine (FLEXERIL) 5 MG tablet, Take 1 tablet (5 mg total) by mouth 3 (three) times daily as needed for Muscle spasms., Disp: 30 tablet, Rfl: 2  FLUoxetine 10 MG Tab, Take 3 tablets (30 mg total) by mouth once daily. (Patient not taking: Reported on 8/21/2020), Disp: 270 tablet, Rfl: 1  gabapentin (NEURONTIN) 300 MG capsule, Take 1 capsule (300 mg total) by mouth 3 (three) times daily. (Patient not taking: Reported on 8/21/2020), Disp: 270 capsule, Rfl: 1  hydrOXYzine pamoate (VISTARIL) 25 MG Cap, Take 1 capsule (25 mg total) by mouth 3 (three) times daily as needed (anxiety). (Patient not taking: Reported on 8/21/2020), Disp: 270 capsule, Rfl: 0  mirtazapine (REMERON) 30 MG tablet, Take 1 tablet (30 mg total) by mouth every evening. (Patient not taking: Reported on 8/21/2020), Disp: 90 tablet, Rfl: 0  multivitamin capsule, Take 1 capsule by mouth once daily., Disp: , Rfl:     No current facility-administered medications for this visit.       Review of patient's allergies indicates:  No Known Allergies        Review of Systems   Constitution: Negative for weight gain and weight loss.   Cardiovascular: Negative for chest pain.   Respiratory: Negative for shortness of breath.    Musculoskeletal: Positive for back pain. Negative for joint pain and joint swelling.   Gastrointestinal: Negative for abdominal pain, bowel incontinence, nausea and vomiting.   Genitourinary: Negative for bladder incontinence.   Neurological: Negative for numbness and paresthesias.         Objective:        General: Donell is well-developed, well-nourished, appears stated age, in no acute distress, alert and oriented to time, place and person.     General    Vitals reviewed.  Constitutional: He is oriented  to person, place, and time. He appears well-developed and well-nourished.   HENT:   Head: Normocephalic and atraumatic.   Pulmonary/Chest: Effort normal.   Neurological: He is alert and oriented to person, place, and time.   Psychiatric: He has a normal mood and affect. His behavior is normal. Judgment and thought content normal.     General Musculoskeletal Exam   Gait: normal     Right Ankle/Foot Exam     Tests   Heel Walk: able to perform  Tiptoe Walk: able to perform    Left Ankle/Foot Exam     Tests   Heel Walk: able to perform  Tiptoe Walk: able to perform  Back (L-Spine & T-Spine) / Neck (C-Spine) Exam     Tenderness Right paramedian tenderness of the Upper L-Spine, Lower T-Spine and Lower L-Spine. Left paramedian tenderness of the Upper L-Spine, Lower T-Spine and Lower L-Spine.     Back (L-Spine & T-Spine) Range of Motion   Extension: 20 (with pain)   Flexion: 90   Lateral bend right: 20   Lateral bend left: 20   Rotation right: 40   Rotation left: 40     Spinal Sensation   Right Side Sensation  C-Spine Level: normal   L-Spine Level: normal  S-Spine Level: normal  Left Side Sensation  C-Spine Level: normal  L-Spine Level: normal  S-Spine Level: normal    Back (L-Spine & T-Spine) Tests   Right Side Tests  Straight leg raise:      Sitting SLR: > 70 degrees      Left Side Tests  Straight leg raise:     Sitting SLR: > 70 degrees          Other He has no scoliosis .  Spinal Kyphosis:  Absent    Comments:  Neg FRANCES bilaterally      Muscle Strength   Right Upper Extremity   Biceps: 5/5   Deltoid:  5/5  Triceps:  5/5  Wrist extension: 5/5   Finger Flexors:  5/5  Left Upper Extremity  Biceps: 5/5   Deltoid:  5/5  Triceps:  5/5  Wrist extension: 5/5   Finger Flexors:  5/5  Right Lower Extremity   Hip Flexion: 5/5   Quadriceps:  5/5   Anterior tibial:  5/5   EHL:  5/5  Left Lower Extremity   Hip Flexion: 5/5   Quadriceps:  5/5   Anterior tibial:  5/5   EHL:  5/5    Reflexes     Left Side  Biceps:  2+  Triceps:   2+  Brachioradialis:  2+  Achilles:  2+  Left Hendricks's Sign:  Absent  Babinski Sign:  absent  Quadriceps:  2+    Right Side   Biceps:  2+  Triceps:  2+  Brachioradialis:  2+  Achilles:  2+  Right Hendricks's Sign:  absent  Babinski Sign:  absent  Quadriceps:  2+    Vascular Exam     Right Pulses        Carotid:                  2+    Left Pulses        Carotid:                  2+              Assessment:       1. Acute midline low back pain without sciatica           Plan:       Orders Placed This Encounter    Ambulatory referral/consult to [facility]    diclofenac (VOLTAREN) 75 MG EC tablet    tiZANidine (ZANAFLEX) 2 MG tablet     1. We discussed back pain and the nature of back pain.  We discussed that it will likely improve and that it is not one thing that causes the pain but an accumulation of multiple things that we do.  He has bilateral muscle tenderness.  We discussed trying conservative options first.  The x-ray does not show any fractures. Previous MRI normal  2. We discussed trying therapy first, if not relief can get MRI but no leg symptoms and no exam findings   3. We discussed the benefits of therapy and exercise and continuing to move.  We discussed continuing activty   4. Tizanidine 2mg po TID  5. Diclofenac 75mg po BID  6. Healthy back pattern 1 lumbar  7. RTC 4 months    More than 50% of the total time  of 45 minutes was spent face to face in counseling on diagnosis and treatment options. I also counseled patient  on common and most usual side effect of prescribed medications.  I reviewed Primary care , and other specialty's notes to better coordinate patient's care. All questions were answered, and patient voiced understanding.     A consultation note will be sent to Dr. Resendiz through epic.  Thanks for the consult      Follow-up: No follow-ups on file. If there are any questions prior to this, the patient was instructed to contact the office.

## 2020-12-04 ENCOUNTER — LAB VISIT (OUTPATIENT)
Dept: PRIMARY CARE CLINIC | Facility: CLINIC | Age: 45
End: 2020-12-04
Payer: OTHER GOVERNMENT

## 2020-12-04 ENCOUNTER — TELEPHONE (OUTPATIENT)
Dept: INTERNAL MEDICINE | Facility: CLINIC | Age: 45
End: 2020-12-04

## 2020-12-04 ENCOUNTER — OFFICE VISIT (OUTPATIENT)
Dept: SPINE | Facility: CLINIC | Age: 45
End: 2020-12-04
Attending: PHYSICAL MEDICINE & REHABILITATION
Payer: OTHER GOVERNMENT

## 2020-12-04 VITALS
DIASTOLIC BLOOD PRESSURE: 83 MMHG | BODY MASS INDEX: 32.15 KG/M2 | HEART RATE: 81 BPM | SYSTOLIC BLOOD PRESSURE: 122 MMHG | WEIGHT: 204.81 LBS | HEIGHT: 67 IN | TEMPERATURE: 98 F

## 2020-12-04 DIAGNOSIS — M54.50 ACUTE MIDLINE LOW BACK PAIN WITHOUT SCIATICA: ICD-10-CM

## 2020-12-04 DIAGNOSIS — Z03.818 ENCOUNTER FOR OBSERVATION FOR SUSPECTED EXPOSURE TO OTHER BIOLOGICAL AGENTS RULED OUT: ICD-10-CM

## 2020-12-04 DIAGNOSIS — Z20.822 CLOSE EXPOSURE TO COVID-19 VIRUS: ICD-10-CM

## 2020-12-04 DIAGNOSIS — Z20.822 CLOSE EXPOSURE TO COVID-19 VIRUS: Primary | ICD-10-CM

## 2020-12-04 PROCEDURE — 99999 PR PBB SHADOW E&M-EST. PATIENT-LVL IV: CPT | Mod: PBBFAC,,, | Performed by: PHYSICAL MEDICINE & REHABILITATION

## 2020-12-04 PROCEDURE — U0003 INFECTIOUS AGENT DETECTION BY NUCLEIC ACID (DNA OR RNA); SEVERE ACUTE RESPIRATORY SYNDROME CORONAVIRUS 2 (SARS-COV-2) (CORONAVIRUS DISEASE [COVID-19]), AMPLIFIED PROBE TECHNIQUE, MAKING USE OF HIGH THROUGHPUT TECHNOLOGIES AS DESCRIBED BY CMS-2020-01-R: HCPCS

## 2020-12-04 PROCEDURE — 99243 PR OFFICE CONSULTATION,LEVEL III: ICD-10-PCS | Mod: S$PBB,,, | Performed by: PHYSICAL MEDICINE & REHABILITATION

## 2020-12-04 PROCEDURE — 99243 OFF/OP CNSLTJ NEW/EST LOW 30: CPT | Mod: S$PBB,,, | Performed by: PHYSICAL MEDICINE & REHABILITATION

## 2020-12-04 PROCEDURE — 99999 PR PBB SHADOW E&M-EST. PATIENT-LVL IV: ICD-10-PCS | Mod: PBBFAC,,, | Performed by: PHYSICAL MEDICINE & REHABILITATION

## 2020-12-04 PROCEDURE — 99214 OFFICE O/P EST MOD 30 MIN: CPT | Mod: PBBFAC | Performed by: PHYSICAL MEDICINE & REHABILITATION

## 2020-12-04 RX ORDER — TIZANIDINE 2 MG/1
1-2 TABLET ORAL EVERY 8 HOURS PRN
Qty: 90 TABLET | Refills: 1 | Status: SHIPPED | OUTPATIENT
Start: 2020-12-04 | End: 2021-03-29 | Stop reason: CLARIF

## 2020-12-04 RX ORDER — DICLOFENAC SODIUM 75 MG/1
75 TABLET, DELAYED RELEASE ORAL 2 TIMES DAILY
Qty: 60 TABLET | Refills: 1 | Status: SHIPPED | OUTPATIENT
Start: 2020-12-04 | End: 2021-03-29 | Stop reason: CLARIF

## 2020-12-04 NOTE — LETTER
December 4, 2020      Yenny Resendiz MD  [address]  [address]  [facility] [address]           [facility]  [address], [address]  [facility] [address]  Phone: [phone]  Fax: [phone]          Patient: Donell Park   MR Number: [id number]   YOB: 1975   Date of Visit: 12/4/2020       Dear Dr. Yenny Resendiz:    Thank you for referring Donell Park to me for evaluation. Attached you will find relevant portions of my assessment and plan of care.    If you have questions, please do not hesitate to call me. I look forward to following Donell Park along with you.    Sincerely,    Lena Montoya MD    Enclosure  CC:  No Recipients    If you would like to receive this communication electronically, please contact [email or web address] or [phone] to request more information on [facility] [employer] access.    For providers and/or their staff who would like to refer a patient to [facility], please contact us through our one-stop-shop provider referral line, [facility], at [phone].    If you feel you have received this communication in error or would no longer like to receive these types of communications, please e-mail [email or web address]

## 2020-12-04 NOTE — TELEPHONE ENCOUNTER
Patient reports they are asymptomatic, but would like to have a COVID-19 screening test ordered.    1. Was the other person confirmed positive for COVID-19? yes  2. Was the patient wearing a mask? no  3. Was the positive person wearing a mask? no  4. Did the contact last longer than 15 minutes? yes  5. Was the contact less than 6 feet away? yes    Orders pended and message sent to provider for further advice.

## 2020-12-07 ENCOUNTER — TELEPHONE (OUTPATIENT)
Dept: INTERNAL MEDICINE | Facility: CLINIC | Age: 45
End: 2020-12-07

## 2020-12-07 LAB — SARS-COV-2 RNA RESP QL NAA+PROBE: NOT DETECTED

## 2020-12-07 NOTE — TELEPHONE ENCOUNTER
Mr. Park states that he has a fever, lost of taste.  Informed patient that his covid test is negative for covid.

## 2020-12-07 NOTE — TELEPHONE ENCOUNTER
Informed pt of vv r/t symptoms. Pt stated his pt portal not working. He will try to see if he can get his portal working again and will give the office a call back.

## 2021-01-11 ENCOUNTER — TELEPHONE (OUTPATIENT)
Dept: INTERNAL MEDICINE | Facility: CLINIC | Age: 46
End: 2021-01-11

## 2021-01-30 ENCOUNTER — TELEPHONE (OUTPATIENT)
Dept: INTERNAL MEDICINE | Facility: CLINIC | Age: 46
End: 2021-01-30

## 2021-03-19 ENCOUNTER — HOSPITAL ENCOUNTER (EMERGENCY)
Facility: HOSPITAL | Age: 46
Discharge: HOME | End: 2021-03-19
Attending: EMERGENCY MEDICINE
Payer: OTHER GOVERNMENT

## 2021-03-19 VITALS
HEIGHT: 67 IN | SYSTOLIC BLOOD PRESSURE: 131 MMHG | OXYGEN SATURATION: 97 % | BODY MASS INDEX: 28.41 KG/M2 | DIASTOLIC BLOOD PRESSURE: 75 MMHG | WEIGHT: 181 LBS | RESPIRATION RATE: 18 BRPM | TEMPERATURE: 99 F | HEART RATE: 75 BPM
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
## 2021-03-19 DIAGNOSIS — S93.402A SPRAIN OF LEFT ANKLE, UNSPECIFIED LIGAMENT, INITIAL ENCOUNTER: ICD-10-CM

## 2021-03-19 DIAGNOSIS — M25.572 ACUTE LEFT ANKLE PAIN: Primary | ICD-10-CM

## 2021-03-19 LAB
HCV AB SERPL QL IA: NEGATIVE
HIV 1+2 AB+HIV1 P24 AG SERPL QL IA: NEGATIVE

## 2021-03-19 PROCEDURE — 86703 HIV-1/HIV-2 1 RESULT ANTBDY: CPT | Performed by: EMERGENCY MEDICINE

## 2021-03-19 PROCEDURE — 99284 PR EMERGENCY DEPT VISIT,LEVEL IV: ICD-10-PCS | Mod: ,,, | Performed by: PHYSICIAN ASSISTANT

## 2021-03-19 PROCEDURE — 99284 EMERGENCY DEPT VISIT MOD MDM: CPT | Mod: ,,, | Performed by: PHYSICIAN ASSISTANT

## 2021-03-19 PROCEDURE — 99284 EMERGENCY DEPT VISIT MOD MDM: CPT | Mod: 25

## 2021-03-19 PROCEDURE — 86803 HEPATITIS C AB TEST: CPT | Performed by: EMERGENCY MEDICINE

## 2021-03-19 RX ORDER — NAPROXEN 500 MG/1
500 TABLET ORAL 2 TIMES DAILY WITH MEALS
Qty: 10 TABLET | Refills: 0 | Status: SHIPPED | OUTPATIENT
Start: 2021-03-19 | End: 2021-03-29 | Stop reason: CLARIF

## 2021-03-23 ENCOUNTER — PATIENT OUTREACH (OUTPATIENT)
Dept: ADMINISTRATIVE | Facility: OTHER | Age: 46
End: 2021-03-23

## 2021-03-23 ENCOUNTER — OFFICE VISIT (OUTPATIENT)
Dept: INTERNAL MEDICINE | Facility: CLINIC | Age: 46
End: 2021-03-23
Payer: MEDICAID

## 2021-03-23 VITALS
BODY MASS INDEX: 29.93 KG/M2 | DIASTOLIC BLOOD PRESSURE: 80 MMHG | OXYGEN SATURATION: 98 % | HEART RATE: 86 BPM | WEIGHT: 190.69 LBS | SYSTOLIC BLOOD PRESSURE: 116 MMHG | HEIGHT: 67 IN

## 2021-03-23 DIAGNOSIS — M54.50 MIDLINE LOW BACK PAIN, UNSPECIFIED CHRONICITY, UNSPECIFIED WHETHER SCIATICA PRESENT: ICD-10-CM

## 2021-03-23 DIAGNOSIS — S96.912D STRAIN OF LEFT ANKLE, SUBSEQUENT ENCOUNTER: ICD-10-CM

## 2021-03-23 DIAGNOSIS — S90.32XA CONTUSION OF LEFT FOOT, INITIAL ENCOUNTER: Primary | ICD-10-CM

## 2021-03-23 PROCEDURE — 99213 PR OFFICE/OUTPT VISIT, EST, LEVL III, 20-29 MIN: ICD-10-PCS | Mod: S$PBB,,, | Performed by: PHYSICIAN ASSISTANT

## 2021-03-23 PROCEDURE — 99214 OFFICE O/P EST MOD 30 MIN: CPT | Mod: PBBFAC | Performed by: PHYSICIAN ASSISTANT

## 2021-03-23 PROCEDURE — 99999 PR PBB SHADOW E&M-EST. PATIENT-LVL IV: ICD-10-PCS | Mod: PBBFAC,,, | Performed by: PHYSICIAN ASSISTANT

## 2021-03-23 PROCEDURE — 99999 PR PBB SHADOW E&M-EST. PATIENT-LVL IV: CPT | Mod: PBBFAC,,, | Performed by: PHYSICIAN ASSISTANT

## 2021-03-23 PROCEDURE — 99213 OFFICE O/P EST LOW 20 MIN: CPT | Mod: S$PBB,,, | Performed by: PHYSICIAN ASSISTANT

## 2021-03-24 ENCOUNTER — OFFICE VISIT (OUTPATIENT)
Dept: ORTHOPEDICS | Facility: CLINIC | Age: 46
End: 2021-03-24
Payer: MEDICAID

## 2021-03-24 DIAGNOSIS — S90.32XA CONTUSION OF LEFT FOOT, INITIAL ENCOUNTER: ICD-10-CM

## 2021-03-24 PROCEDURE — 99203 OFFICE O/P NEW LOW 30 MIN: CPT | Mod: S$PBB,,, | Performed by: ORTHOPAEDIC SURGERY

## 2021-03-24 PROCEDURE — 99999 PR PBB SHADOW E&M-EST. PATIENT-LVL II: CPT | Mod: PBBFAC,,, | Performed by: ORTHOPAEDIC SURGERY

## 2021-03-24 PROCEDURE — 99212 OFFICE O/P EST SF 10 MIN: CPT | Mod: PBBFAC | Performed by: ORTHOPAEDIC SURGERY

## 2021-03-24 PROCEDURE — 99999 PR PBB SHADOW E&M-EST. PATIENT-LVL II: ICD-10-PCS | Mod: PBBFAC,,, | Performed by: ORTHOPAEDIC SURGERY

## 2021-03-24 PROCEDURE — 99203 PR OFFICE/OUTPT VISIT, NEW, LEVL III, 30-44 MIN: ICD-10-PCS | Mod: S$PBB,,, | Performed by: ORTHOPAEDIC SURGERY

## 2021-03-24 RX ORDER — TRAMADOL HYDROCHLORIDE 50 MG/1
50 TABLET ORAL EVERY 6 HOURS PRN
Qty: 28 TABLET | Refills: 0 | Status: SHIPPED | OUTPATIENT
Start: 2021-03-24 | End: 2021-03-29 | Stop reason: CLARIF

## 2021-03-29 ENCOUNTER — HOSPITAL ENCOUNTER (EMERGENCY)
Facility: HOSPITAL | Age: 46
Discharge: HOME | End: 2021-03-29
Attending: EMERGENCY MEDICINE
Payer: MEDICAID

## 2021-03-29 VITALS
HEART RATE: 77 BPM | BODY MASS INDEX: 28.25 KG/M2 | OXYGEN SATURATION: 99 % | DIASTOLIC BLOOD PRESSURE: 103 MMHG | WEIGHT: 180 LBS | HEIGHT: 67 IN | TEMPERATURE: 98 F | SYSTOLIC BLOOD PRESSURE: 129 MMHG | RESPIRATION RATE: 16 BRPM

## 2021-03-29 DIAGNOSIS — R59.0 ENLARGED LYMPH NODE IN NECK: Primary | ICD-10-CM

## 2021-03-29 PROCEDURE — 99284 EMERGENCY DEPT VISIT MOD MDM: CPT | Mod: ,,, | Performed by: EMERGENCY MEDICINE

## 2021-03-29 PROCEDURE — 99284 PR EMERGENCY DEPT VISIT,LEVEL IV: ICD-10-PCS | Mod: ,,, | Performed by: EMERGENCY MEDICINE

## 2021-03-29 PROCEDURE — 99281 EMR DPT VST MAYX REQ PHY/QHP: CPT

## 2021-04-07 ENCOUNTER — OFFICE VISIT (OUTPATIENT)
Dept: ORTHOPEDICS | Facility: CLINIC | Age: 46
End: 2021-04-07
Payer: MEDICAID

## 2021-04-07 DIAGNOSIS — S90.32XA CONTUSION OF LEFT FOOT, INITIAL ENCOUNTER: Primary | ICD-10-CM

## 2021-04-07 PROCEDURE — 99999 PR PBB SHADOW E&M-EST. PATIENT-LVL I: CPT | Mod: PBBFAC,,, | Performed by: ORTHOPAEDIC SURGERY

## 2021-04-07 PROCEDURE — 99999 PR PBB SHADOW E&M-EST. PATIENT-LVL I: ICD-10-PCS | Mod: PBBFAC,,, | Performed by: ORTHOPAEDIC SURGERY

## 2021-04-07 PROCEDURE — 99213 OFFICE O/P EST LOW 20 MIN: CPT | Mod: S$PBB,,, | Performed by: ORTHOPAEDIC SURGERY

## 2021-04-07 PROCEDURE — 99213 PR OFFICE/OUTPT VISIT, EST, LEVL III, 20-29 MIN: ICD-10-PCS | Mod: S$PBB,,, | Performed by: ORTHOPAEDIC SURGERY

## 2021-04-07 PROCEDURE — 99211 OFF/OP EST MAY X REQ PHY/QHP: CPT | Mod: PBBFAC | Performed by: ORTHOPAEDIC SURGERY

## 2021-04-15 ENCOUNTER — TELEPHONE (OUTPATIENT)
Dept: INTERNAL MEDICINE | Facility: CLINIC | Age: 46
End: 2021-04-15

## 2021-04-15 ENCOUNTER — HOSPITAL ENCOUNTER (EMERGENCY)
Facility: HOSPITAL | Age: 46
Discharge: HOME | End: 2021-04-15
Attending: EMERGENCY MEDICINE
Payer: MEDICAID

## 2021-04-15 ENCOUNTER — PATIENT OUTREACH (OUTPATIENT)
Dept: EMERGENCY MEDICINE | Facility: HOSPITAL | Age: 46
End: 2021-04-15

## 2021-04-15 VITALS
RESPIRATION RATE: 16 BRPM | SYSTOLIC BLOOD PRESSURE: 112 MMHG | HEART RATE: 60 BPM | TEMPERATURE: 98 F | HEIGHT: 67 IN | BODY MASS INDEX: 28.25 KG/M2 | DIASTOLIC BLOOD PRESSURE: 66 MMHG | OXYGEN SATURATION: 99 % | WEIGHT: 180 LBS

## 2021-04-15 DIAGNOSIS — S93.402D SPRAIN OF LEFT ANKLE, UNSPECIFIED LIGAMENT, SUBSEQUENT ENCOUNTER: Primary | ICD-10-CM
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
## 2021-04-15 LAB
BASOPHILS # BLD AUTO: 0.06 K/UL (ref 0–0.2)
BASOPHILS NFR BLD: 0.9 % (ref 0–1.9)
BUN SERPL-MCNC: 10 MG/DL (ref 6–30)
CHLORIDE SERPL-SCNC: 105 MMOL/L (ref 95–110)
CREAT SERPL-MCNC: 1 MG/DL (ref 0.5–1.4)
DIFFERENTIAL METHOD: NORMAL
EOSINOPHIL # BLD AUTO: 0.3 K/UL (ref 0–0.5)
EOSINOPHIL NFR BLD: 3.9 % (ref 0–8)
ERYTHROCYTE [DISTWIDTH] IN BLOOD BY AUTOMATED COUNT: 13.5 % (ref 11.5–14.5)
GLUCOSE SERPL-MCNC: 91 MG/DL (ref 70–110)
HCT VFR BLD AUTO: 41.3 % (ref 40–54)
HCT VFR BLD CALC: 42 %PCV (ref 36–54)
HGB BLD-MCNC: 14.6 G/DL (ref 14–18)
IMM GRANULOCYTES # BLD AUTO: 0.01 K/UL (ref 0–0.04)
IMM GRANULOCYTES NFR BLD AUTO: 0.1 % (ref 0–0.5)
LYMPHOCYTES # BLD AUTO: 1.4 K/UL (ref 1–4.8)
LYMPHOCYTES NFR BLD: 20.6 % (ref 18–48)
MCH RBC QN AUTO: 30.7 PG (ref 27–31)
MCHC RBC AUTO-ENTMCNC: 35.4 G/DL (ref 32–36)
MCV RBC AUTO: 87 FL (ref 82–98)
MONOCYTES # BLD AUTO: 0.9 K/UL (ref 0.3–1)
MONOCYTES NFR BLD: 12.8 % (ref 4–15)
NEUTROPHILS # BLD AUTO: 4.2 K/UL (ref 1.8–7.7)
NEUTROPHILS NFR BLD: 61.7 % (ref 38–73)
NRBC BLD-RTO: 0 /100 WBC
PLATELET # BLD AUTO: 246 K/UL (ref 150–450)
PMV BLD AUTO: 10.6 FL (ref 9.2–12.9)
POC IONIZED CALCIUM: 1.13 MMOL/L (ref 1.06–1.42)
POC TCO2 (MEASURED): 24 MMOL/L (ref 23–29)
POTASSIUM BLD-SCNC: 3.8 MMOL/L (ref 3.5–5.1)
RBC # BLD AUTO: 4.76 M/UL (ref 4.6–6.2)
SAMPLE: NORMAL
SODIUM BLD-SCNC: 140 MMOL/L (ref 136–145)
WBC # BLD AUTO: 6.85 K/UL (ref 3.9–12.7)

## 2021-04-15 PROCEDURE — 99284 EMERGENCY DEPT VISIT MOD MDM: CPT | Mod: ,,, | Performed by: EMERGENCY MEDICINE

## 2021-04-15 PROCEDURE — 85025 COMPLETE CBC W/AUTO DIFF WBC: CPT | Performed by: EMERGENCY MEDICINE

## 2021-04-15 PROCEDURE — 99285 EMERGENCY DEPT VISIT HI MDM: CPT | Mod: 25

## 2021-04-15 PROCEDURE — 80047 BASIC METABLC PNL IONIZED CA: CPT

## 2021-04-15 PROCEDURE — 25500020 PHARM REV CODE 255: Performed by: EMERGENCY MEDICINE

## 2021-04-15 PROCEDURE — 99284 PR EMERGENCY DEPT VISIT,LEVEL IV: ICD-10-PCS | Mod: ,,, | Performed by: EMERGENCY MEDICINE

## 2021-04-15 RX ADMIN — IOHEXOL 75 ML: 350 INJECTION, SOLUTION INTRAVENOUS at 11:04

## 2021-04-16 ENCOUNTER — PATIENT MESSAGE (OUTPATIENT)
Dept: RESEARCH | Facility: HOSPITAL | Age: 46
End: 2021-04-16

## 2021-04-20 ENCOUNTER — OFFICE VISIT (OUTPATIENT)
Dept: INTERNAL MEDICINE | Facility: CLINIC | Age: 46
End: 2021-04-20
Payer: MEDICAID

## 2021-04-20 VITALS
BODY MASS INDEX: 29.58 KG/M2 | OXYGEN SATURATION: 97 % | DIASTOLIC BLOOD PRESSURE: 70 MMHG | WEIGHT: 188.5 LBS | SYSTOLIC BLOOD PRESSURE: 106 MMHG | HEART RATE: 77 BPM | HEIGHT: 67 IN

## 2021-04-20 DIAGNOSIS — R42 LIGHTHEADEDNESS: ICD-10-CM

## 2021-04-20 DIAGNOSIS — R11.0 NAUSEA: ICD-10-CM

## 2021-04-20 DIAGNOSIS — S93.402D SPRAIN OF LEFT ANKLE, UNSPECIFIED LIGAMENT, SUBSEQUENT ENCOUNTER: ICD-10-CM

## 2021-04-20 DIAGNOSIS — R10.9 LEFT SIDED ABDOMINAL PAIN: ICD-10-CM

## 2021-04-20 DIAGNOSIS — F17.210 MODERATE SMOKER (20 OR LESS PER DAY): ICD-10-CM

## 2021-04-20 DIAGNOSIS — R30.0 DYSURIA: ICD-10-CM

## 2021-04-20 DIAGNOSIS — R59.0 SUBMANDIBULAR LYMPHADENOPATHY: Primary | ICD-10-CM

## 2021-04-20 PROBLEM — F33.3 SEVERE EPISODE OF RECURRENT MAJOR DEPRESSIVE DISORDER, WITH PSYCHOTIC FEATURES: Status: RESOLVED | Noted: 2019-06-25 | Resolved: 2021-04-20

## 2021-04-20 PROBLEM — F33.0 MILD EPISODE OF RECURRENT MAJOR DEPRESSIVE DISORDER: Status: ACTIVE | Noted: 2018-11-19

## 2021-04-20 PROCEDURE — 99215 OFFICE O/P EST HI 40 MIN: CPT | Mod: S$PBB,,, | Performed by: INTERNAL MEDICINE

## 2021-04-20 PROCEDURE — 99213 OFFICE O/P EST LOW 20 MIN: CPT | Mod: PBBFAC | Performed by: INTERNAL MEDICINE

## 2021-04-20 PROCEDURE — 99215 PR OFFICE/OUTPT VISIT, EST, LEVL V, 40-54 MIN: ICD-10-PCS | Mod: S$PBB,,, | Performed by: INTERNAL MEDICINE

## 2021-04-20 PROCEDURE — 99999 PR PBB SHADOW E&M-EST. PATIENT-LVL III: ICD-10-PCS | Mod: PBBFAC,,, | Performed by: INTERNAL MEDICINE

## 2021-04-20 PROCEDURE — 99999 PR PBB SHADOW E&M-EST. PATIENT-LVL III: CPT | Mod: PBBFAC,,, | Performed by: INTERNAL MEDICINE

## 2021-04-20 RX ORDER — ONDANSETRON 4 MG/1
4 TABLET, ORALLY DISINTEGRATING ORAL EVERY 6 HOURS PRN
Qty: 30 TABLET | Refills: 1 | Status: SHIPPED | OUTPATIENT
Start: 2021-04-20 | End: 2022-10-14

## 2021-04-20 RX ORDER — PANTOPRAZOLE SODIUM 40 MG/1
40 TABLET, DELAYED RELEASE ORAL EVERY MORNING
Qty: 30 TABLET | Refills: 3 | Status: SHIPPED | OUTPATIENT
Start: 2021-04-20 | End: 2022-10-14

## 2021-04-22 ENCOUNTER — HOSPITAL ENCOUNTER (OUTPATIENT)
Dept: RADIOLOGY | Facility: OTHER | Age: 46
Discharge: HOME | End: 2021-04-22
Attending: INTERNAL MEDICINE
Payer: MEDICAID

## 2021-04-22 DIAGNOSIS — R10.9 LEFT SIDED ABDOMINAL PAIN: ICD-10-CM

## 2021-04-22 PROCEDURE — 74177 CT ABD & PELVIS W/CONTRAST: CPT | Mod: 26,,, | Performed by: RADIOLOGY

## 2021-04-22 PROCEDURE — 74177 CT ABD & PELVIS W/CONTRAST: CPT | Mod: TC

## 2021-04-22 PROCEDURE — 74177 CT ABDOMEN PELVIS WITH CONTRAST: ICD-10-PCS | Mod: 26,,, | Performed by: RADIOLOGY

## 2021-04-22 PROCEDURE — 25500020 PHARM REV CODE 255: Performed by: INTERNAL MEDICINE

## 2021-04-22 RX ADMIN — IOHEXOL 100 ML: 350 INJECTION, SOLUTION INTRAVENOUS at 12:04

## 2021-05-03 ENCOUNTER — TELEPHONE (OUTPATIENT)
Dept: INTERNAL MEDICINE | Facility: CLINIC | Age: 46
End: 2021-05-03

## 2021-06-25 ENCOUNTER — HOSPITAL ENCOUNTER (EMERGENCY)
Facility: HOSPITAL | Age: 46
Discharge: HOME | End: 2021-06-25
Attending: EMERGENCY MEDICINE
Payer: MEDICAID

## 2021-06-25 VITALS
WEIGHT: 180 LBS | HEIGHT: 67 IN | OXYGEN SATURATION: 98 % | RESPIRATION RATE: 20 BRPM | HEART RATE: 79 BPM | DIASTOLIC BLOOD PRESSURE: 91 MMHG | TEMPERATURE: 98 F | SYSTOLIC BLOOD PRESSURE: 138 MMHG | BODY MASS INDEX: 28.25 KG/M2

## 2021-06-25 DIAGNOSIS — H57.89 EYE IRRITATION: Primary | ICD-10-CM

## 2021-06-25 DIAGNOSIS — H10.9 CONJUNCTIVITIS OF BOTH EYES, UNSPECIFIED CONJUNCTIVITIS TYPE: ICD-10-CM

## 2021-06-25 PROCEDURE — 99284 PR EMERGENCY DEPT VISIT,LEVEL IV: ICD-10-PCS | Mod: ,,, | Performed by: NURSE PRACTITIONER

## 2021-06-25 PROCEDURE — 99284 EMERGENCY DEPT VISIT MOD MDM: CPT | Mod: ,,, | Performed by: NURSE PRACTITIONER

## 2021-06-25 PROCEDURE — 99283 EMERGENCY DEPT VISIT LOW MDM: CPT

## 2021-06-25 PROCEDURE — 25000003 PHARM REV CODE 250: Performed by: NURSE PRACTITIONER

## 2021-06-25 RX ORDER — ERYTHROMYCIN 5 MG/G
OINTMENT OPHTHALMIC NIGHTLY
Status: DISCONTINUED | OUTPATIENT
Start: 2021-06-25 | End: 2021-06-25 | Stop reason: HOSPADM

## 2021-06-25 RX ORDER — ERYTHROMYCIN 5 MG/G
OINTMENT OPHTHALMIC
Qty: 2 TUBE | Refills: 0 | Status: SHIPPED | OUTPATIENT
Start: 2021-06-25 | End: 2022-06-16

## 2021-06-25 RX ORDER — TRAMADOL HYDROCHLORIDE 50 MG/1
50 TABLET ORAL EVERY 6 HOURS
COMMUNITY
End: 2022-10-14

## 2021-06-25 RX ORDER — TETRACAINE HYDROCHLORIDE 5 MG/ML
2 SOLUTION OPHTHALMIC
Status: COMPLETED | OUTPATIENT
Start: 2021-06-25 | End: 2021-06-25

## 2021-06-25 RX ADMIN — FLUORESCEIN SODIUM 1 EACH: 1 STRIP OPHTHALMIC at 01:06

## 2021-06-25 RX ADMIN — TETRACAINE HYDROCHLORIDE 2 DROP: 5 SOLUTION OPHTHALMIC at 01:06

## 2021-08-05 ENCOUNTER — TELEPHONE (OUTPATIENT)
Dept: INTERNAL MEDICINE | Facility: CLINIC | Age: 46
End: 2021-08-05

## 2021-08-05 ENCOUNTER — HOSPITAL ENCOUNTER (EMERGENCY)
Facility: OTHER | Age: 46
Discharge: HOME | End: 2021-08-05
Attending: EMERGENCY MEDICINE
Payer: MEDICAID

## 2021-08-05 VITALS
OXYGEN SATURATION: 100 % | TEMPERATURE: 98 F | HEART RATE: 65 BPM | RESPIRATION RATE: 20 BRPM | DIASTOLIC BLOOD PRESSURE: 82 MMHG | SYSTOLIC BLOOD PRESSURE: 122 MMHG

## 2021-08-05 DIAGNOSIS — U07.1 COVID-19: Primary | ICD-10-CM

## 2021-08-05 DIAGNOSIS — R07.9 CHEST PAIN: ICD-10-CM

## 2021-08-05 DIAGNOSIS — R06.02 SOB (SHORTNESS OF BREATH): ICD-10-CM

## 2021-08-05 LAB
ALBUMIN SERPL BCP-MCNC: 3.5 G/DL (ref 3.5–5.2)
ALP SERPL-CCNC: 60 U/L (ref 55–135)
ALT SERPL W/O P-5'-P-CCNC: 15 U/L (ref 10–44)
ANION GAP SERPL CALC-SCNC: 8 MMOL/L (ref 8–16)
AST SERPL-CCNC: 18 U/L (ref 10–40)
BASOPHILS # BLD AUTO: 0.05 K/UL (ref 0–0.2)
BASOPHILS NFR BLD: 0.8 % (ref 0–1.9)
BILIRUB SERPL-MCNC: 1.1 MG/DL (ref 0.1–1)
BUN SERPL-MCNC: 12 MG/DL (ref 6–20)
CALCIUM SERPL-MCNC: 8.6 MG/DL (ref 8.7–10.5)
CHLORIDE SERPL-SCNC: 109 MMOL/L (ref 95–110)
CO2 SERPL-SCNC: 22 MMOL/L (ref 23–29)
CREAT SERPL-MCNC: 0.9 MG/DL (ref 0.5–1.4)
CRP SERPL-MCNC: 5.6 MG/L (ref 0–8.2)
CTP QC/QA: YES
DIFFERENTIAL METHOD: ABNORMAL
EOSINOPHIL # BLD AUTO: 0.5 K/UL (ref 0–0.5)
EOSINOPHIL NFR BLD: 7.6 % (ref 0–8)
ERYTHROCYTE [DISTWIDTH] IN BLOOD BY AUTOMATED COUNT: 15.1 % (ref 11.5–14.5)
EST. GFR  (AFRICAN AMERICAN): >60 ML/MIN/1.73 M^2
EST. GFR  (NON AFRICAN AMERICAN): >60 ML/MIN/1.73 M^2
FERRITIN SERPL-MCNC: 205 NG/ML (ref 20–300)
GLUCOSE SERPL-MCNC: 108 MG/DL (ref 70–110)
HCT VFR BLD AUTO: 42.8 % (ref 40–54)
HGB BLD-MCNC: 14.8 G/DL (ref 14–18)
IMM GRANULOCYTES # BLD AUTO: 0.02 K/UL (ref 0–0.04)
IMM GRANULOCYTES NFR BLD AUTO: 0.3 % (ref 0–0.5)
LDH SERPL L TO P-CCNC: 164 U/L (ref 110–260)
LYMPHOCYTES # BLD AUTO: 1.6 K/UL (ref 1–4.8)
LYMPHOCYTES NFR BLD: 25.2 % (ref 18–48)
MCH RBC QN AUTO: 31.8 PG (ref 27–31)
MCHC RBC AUTO-ENTMCNC: 34.6 G/DL (ref 32–36)
MCV RBC AUTO: 92 FL (ref 82–98)
MONOCYTES # BLD AUTO: 1.1 K/UL (ref 0.3–1)
MONOCYTES NFR BLD: 16.5 % (ref 4–15)
NEUTROPHILS # BLD AUTO: 3.2 K/UL (ref 1.8–7.7)
NEUTROPHILS NFR BLD: 49.6 % (ref 38–73)
NRBC BLD-RTO: 0 /100 WBC
PLATELET # BLD AUTO: 263 K/UL (ref 150–450)
PMV BLD AUTO: 10.4 FL (ref 9.2–12.9)
POTASSIUM SERPL-SCNC: 4 MMOL/L (ref 3.5–5.1)
PROT SERPL-MCNC: 6.1 G/DL (ref 6–8.4)
RBC # BLD AUTO: 4.65 M/UL (ref 4.6–6.2)
SARS-COV-2 RDRP RESP QL NAA+PROBE: POSITIVE
SODIUM SERPL-SCNC: 139 MMOL/L (ref 136–145)
TROPONIN I SERPL DL<=0.01 NG/ML-MCNC: 0.01 NG/ML (ref 0–0.03)
WBC # BLD AUTO: 6.42 K/UL (ref 3.9–12.7)

## 2021-08-05 PROCEDURE — 86140 C-REACTIVE PROTEIN: CPT | Performed by: PHYSICIAN ASSISTANT

## 2021-08-05 PROCEDURE — 25000242 PHARM REV CODE 250 ALT 637 W/ HCPCS: Performed by: EMERGENCY MEDICINE

## 2021-08-05 PROCEDURE — 99285 EMERGENCY DEPT VISIT HI MDM: CPT | Mod: 25

## 2021-08-05 PROCEDURE — 85025 COMPLETE CBC W/AUTO DIFF WBC: CPT | Performed by: PHYSICIAN ASSISTANT

## 2021-08-05 PROCEDURE — 82728 ASSAY OF FERRITIN: CPT | Performed by: PHYSICIAN ASSISTANT

## 2021-08-05 PROCEDURE — 96361 HYDRATE IV INFUSION ADD-ON: CPT

## 2021-08-05 PROCEDURE — 80053 COMPREHEN METABOLIC PANEL: CPT | Performed by: PHYSICIAN ASSISTANT

## 2021-08-05 PROCEDURE — 83615 LACTATE (LD) (LDH) ENZYME: CPT | Performed by: PHYSICIAN ASSISTANT

## 2021-08-05 PROCEDURE — U0002 COVID-19 LAB TEST NON-CDC: HCPCS | Performed by: PHYSICIAN ASSISTANT

## 2021-08-05 PROCEDURE — 93005 ELECTROCARDIOGRAM TRACING: CPT

## 2021-08-05 PROCEDURE — 84484 ASSAY OF TROPONIN QUANT: CPT | Performed by: PHYSICIAN ASSISTANT

## 2021-08-05 PROCEDURE — 93010 ELECTROCARDIOGRAM REPORT: CPT | Mod: ,,, | Performed by: INTERNAL MEDICINE

## 2021-08-05 PROCEDURE — 96360 HYDRATION IV INFUSION INIT: CPT

## 2021-08-05 PROCEDURE — 25000003 PHARM REV CODE 250: Performed by: PHYSICIAN ASSISTANT

## 2021-08-05 PROCEDURE — 93010 EKG 12-LEAD: ICD-10-PCS | Mod: ,,, | Performed by: INTERNAL MEDICINE

## 2021-08-05 RX ORDER — SODIUM CHLORIDE 9 MG/ML
INJECTION, SOLUTION INTRAVENOUS
Status: COMPLETED | OUTPATIENT
Start: 2021-08-05 | End: 2021-08-05

## 2021-08-05 RX ORDER — KETOROLAC TROMETHAMINE 10 MG/1
10 TABLET, FILM COATED ORAL
Status: COMPLETED | OUTPATIENT
Start: 2021-08-05 | End: 2021-08-05

## 2021-08-05 RX ORDER — ALBUTEROL SULFATE 90 UG/1
4 AEROSOL, METERED RESPIRATORY (INHALATION) EVERY 8 HOURS
Status: DISCONTINUED | OUTPATIENT
Start: 2021-08-05 | End: 2021-08-05 | Stop reason: HOSPADM

## 2021-08-05 RX ORDER — ALBUTEROL SULFATE 90 UG/1
4 AEROSOL, METERED RESPIRATORY (INHALATION) EVERY 8 HOURS
Status: DISCONTINUED | OUTPATIENT
Start: 2021-08-05 | End: 2021-08-05

## 2021-08-05 RX ORDER — HYDROXYZINE HYDROCHLORIDE 25 MG/1
25 TABLET, FILM COATED ORAL EVERY 6 HOURS
Qty: 12 TABLET | Refills: 0 | Status: SHIPPED | OUTPATIENT
Start: 2021-08-05 | End: 2022-10-14

## 2021-08-05 RX ORDER — TRAMADOL HYDROCHLORIDE 50 MG/1
50 TABLET ORAL
Status: COMPLETED | OUTPATIENT
Start: 2021-08-05 | End: 2021-08-05

## 2021-08-05 RX ORDER — ACETAMINOPHEN 500 MG
1000 TABLET ORAL
Status: COMPLETED | OUTPATIENT
Start: 2021-08-05 | End: 2021-08-05

## 2021-08-05 RX ORDER — IBUPROFEN 600 MG/1
600 TABLET ORAL EVERY 6 HOURS PRN
Qty: 20 TABLET | Refills: 0 | Status: SHIPPED | OUTPATIENT
Start: 2021-08-05 | End: 2021-08-09

## 2021-08-05 RX ORDER — BENZONATATE 100 MG/1
100 CAPSULE ORAL 3 TIMES DAILY PRN
Qty: 20 CAPSULE | Refills: 0 | Status: SHIPPED | OUTPATIENT
Start: 2021-08-05 | End: 2021-08-15

## 2021-08-05 RX ADMIN — SODIUM CHLORIDE: 9 INJECTION, SOLUTION INTRAVENOUS at 01:08

## 2021-08-05 RX ADMIN — ALBUTEROL SULFATE 4 PUFF: 90 AEROSOL, METERED RESPIRATORY (INHALATION) at 01:08

## 2021-08-05 RX ADMIN — ACETAMINOPHEN 1000 MG: 500 TABLET, FILM COATED ORAL at 01:08

## 2021-08-05 RX ADMIN — KETOROLAC TROMETHAMINE 10 MG: 10 TABLET, FILM COATED ORAL at 01:08

## 2021-08-05 RX ADMIN — TRAMADOL HYDROCHLORIDE 50 MG: 50 TABLET, FILM COATED ORAL at 03:08

## 2021-08-07 ENCOUNTER — NURSE TRIAGE (OUTPATIENT)
Dept: ADMINISTRATIVE | Facility: CLINIC | Age: 46
End: 2021-08-07

## 2021-08-10 ENCOUNTER — OFFICE VISIT (OUTPATIENT)
Dept: INTERNAL MEDICINE | Facility: CLINIC | Age: 46
End: 2021-08-10
Attending: INTERNAL MEDICINE
Payer: MEDICAID

## 2021-08-10 ENCOUNTER — TELEPHONE (OUTPATIENT)
Dept: INTERNAL MEDICINE | Facility: CLINIC | Age: 46
End: 2021-08-10

## 2021-08-10 DIAGNOSIS — U07.1 COVID-19: Primary | ICD-10-CM

## 2021-08-10 PROCEDURE — 99213 PR OFFICE/OUTPT VISIT, EST, LEVL III, 20-29 MIN: ICD-10-PCS | Mod: 95,,, | Performed by: INTERNAL MEDICINE

## 2021-08-10 PROCEDURE — 99213 OFFICE O/P EST LOW 20 MIN: CPT | Mod: 95,,, | Performed by: INTERNAL MEDICINE

## 2021-08-16 ENCOUNTER — TELEPHONE (OUTPATIENT)
Dept: INTERNAL MEDICINE | Facility: CLINIC | Age: 46
End: 2021-08-16

## 2021-08-16 DIAGNOSIS — F33.1 MODERATE EPISODE OF RECURRENT MAJOR DEPRESSIVE DISORDER: Primary | ICD-10-CM

## 2021-08-16 DIAGNOSIS — G47.09 OTHER INSOMNIA: ICD-10-CM

## 2021-08-18 RX ORDER — MIRTAZAPINE 7.5 MG/1
7.5 TABLET, FILM COATED ORAL NIGHTLY
Qty: 30 TABLET | Refills: 11 | Status: SHIPPED | OUTPATIENT
Start: 2021-08-18 | End: 2022-08-29

## 2021-09-30 ENCOUNTER — TELEPHONE (OUTPATIENT)
Dept: INTERNAL MEDICINE | Facility: CLINIC | Age: 46
End: 2021-09-30

## 2021-12-02 ENCOUNTER — HOSPITAL ENCOUNTER (EMERGENCY)
Facility: HOSPITAL | Age: 46
Discharge: HOME | End: 2021-12-02
Attending: EMERGENCY MEDICINE
Payer: MEDICAID

## 2021-12-02 VITALS
WEIGHT: 170 LBS | HEART RATE: 66 BPM | DIASTOLIC BLOOD PRESSURE: 69 MMHG | SYSTOLIC BLOOD PRESSURE: 129 MMHG | RESPIRATION RATE: 18 BRPM | BODY MASS INDEX: 26.68 KG/M2 | HEIGHT: 67 IN | OXYGEN SATURATION: 98 % | TEMPERATURE: 98 F

## 2021-12-02 DIAGNOSIS — G43.109 COMPLICATED MIGRAINE: Primary | ICD-10-CM

## 2021-12-02 LAB
ALBUMIN SERPL BCP-MCNC: 4 G/DL (ref 3.5–5.2)
ALP SERPL-CCNC: 75 U/L (ref 55–135)
ALT SERPL W/O P-5'-P-CCNC: 15 U/L (ref 10–44)
ANION GAP SERPL CALC-SCNC: 9 MMOL/L (ref 8–16)
AST SERPL-CCNC: 21 U/L (ref 10–40)
BASOPHILS # BLD AUTO: 0.04 K/UL (ref 0–0.2)
BASOPHILS NFR BLD: 0.5 % (ref 0–1.9)
BILIRUB SERPL-MCNC: 1 MG/DL (ref 0.1–1)
BUN SERPL-MCNC: 8 MG/DL (ref 6–20)
CALCIUM SERPL-MCNC: 9.5 MG/DL (ref 8.7–10.5)
CHLORIDE SERPL-SCNC: 109 MMOL/L (ref 95–110)
CHOLEST SERPL-MCNC: 206 MG/DL (ref 120–199)
CHOLEST/HDLC SERPL: 4.1 {RATIO} (ref 2–5)
CO2 SERPL-SCNC: 22 MMOL/L (ref 23–29)
CREAT SERPL-MCNC: 0.9 MG/DL (ref 0.5–1.4)
CTP QC/QA: YES
DIFFERENTIAL METHOD: ABNORMAL
EOSINOPHIL # BLD AUTO: 0 K/UL (ref 0–0.5)
EOSINOPHIL NFR BLD: 0.5 % (ref 0–8)
ERYTHROCYTE [DISTWIDTH] IN BLOOD BY AUTOMATED COUNT: 13.9 % (ref 11.5–14.5)
EST. GFR  (AFRICAN AMERICAN): >60 ML/MIN/1.73 M^2
EST. GFR  (NON AFRICAN AMERICAN): >60 ML/MIN/1.73 M^2
GLUCOSE SERPL-MCNC: 97 MG/DL (ref 70–110)
HCT VFR BLD AUTO: 44.1 % (ref 40–54)
HDLC SERPL-MCNC: 50 MG/DL (ref 40–75)
HDLC SERPL: 24.3 % (ref 20–50)
HGB BLD-MCNC: 15.7 G/DL (ref 14–18)
IMM GRANULOCYTES # BLD AUTO: 0.02 K/UL (ref 0–0.04)
IMM GRANULOCYTES NFR BLD AUTO: 0.2 % (ref 0–0.5)
INR PPP: 0.9 (ref 0.8–1.2)
LDLC SERPL CALC-MCNC: 135.6 MG/DL (ref 63–159)
LYMPHOCYTES # BLD AUTO: 2.1 K/UL (ref 1–4.8)
LYMPHOCYTES NFR BLD: 23.7 % (ref 18–48)
MCH RBC QN AUTO: 31.3 PG (ref 27–31)
MCHC RBC AUTO-ENTMCNC: 35.6 G/DL (ref 32–36)
MCV RBC AUTO: 88 FL (ref 82–98)
MONOCYTES # BLD AUTO: 1 K/UL (ref 0.3–1)
MONOCYTES NFR BLD: 11.3 % (ref 4–15)
NEUTROPHILS # BLD AUTO: 5.5 K/UL (ref 1.8–7.7)
NEUTROPHILS NFR BLD: 63.8 % (ref 38–73)
NONHDLC SERPL-MCNC: 156 MG/DL
NRBC BLD-RTO: 0 /100 WBC
PLATELET # BLD AUTO: 267 K/UL (ref 150–450)
PMV BLD AUTO: 10.5 FL (ref 9.2–12.9)
POTASSIUM SERPL-SCNC: 3.9 MMOL/L (ref 3.5–5.1)
PROT SERPL-MCNC: 7 G/DL (ref 6–8.4)
PROTHROMBIN TIME: 10.2 SEC (ref 9–12.5)
RBC # BLD AUTO: 5.02 M/UL (ref 4.6–6.2)
SARS-COV-2 RDRP RESP QL NAA+PROBE: NEGATIVE
SODIUM SERPL-SCNC: 140 MMOL/L (ref 136–145)
TRIGL SERPL-MCNC: 102 MG/DL (ref 30–150)
TSH SERPL DL<=0.005 MIU/L-ACNC: 0.84 UIU/ML (ref 0.4–4)
WBC # BLD AUTO: 8.66 K/UL (ref 3.9–12.7)

## 2021-12-02 PROCEDURE — 80053 COMPREHEN METABOLIC PANEL: CPT | Performed by: EMERGENCY MEDICINE

## 2021-12-02 PROCEDURE — 63600175 PHARM REV CODE 636 W HCPCS: Performed by: EMERGENCY MEDICINE

## 2021-12-02 PROCEDURE — 96374 THER/PROPH/DIAG INJ IV PUSH: CPT

## 2021-12-02 PROCEDURE — 25000003 PHARM REV CODE 250: Performed by: EMERGENCY MEDICINE

## 2021-12-02 PROCEDURE — U0002 COVID-19 LAB TEST NON-CDC: HCPCS | Performed by: EMERGENCY MEDICINE

## 2021-12-02 PROCEDURE — 85610 PROTHROMBIN TIME: CPT | Performed by: EMERGENCY MEDICINE

## 2021-12-02 PROCEDURE — 99284 PR EMERGENCY DEPT VISIT,LEVEL IV: ICD-10-PCS | Mod: CS,,, | Performed by: EMERGENCY MEDICINE

## 2021-12-02 PROCEDURE — 96375 TX/PRO/DX INJ NEW DRUG ADDON: CPT

## 2021-12-02 PROCEDURE — 80061 LIPID PANEL: CPT | Performed by: EMERGENCY MEDICINE

## 2021-12-02 PROCEDURE — 99285 EMERGENCY DEPT VISIT HI MDM: CPT | Mod: 25

## 2021-12-02 PROCEDURE — 84443 ASSAY THYROID STIM HORMONE: CPT | Performed by: EMERGENCY MEDICINE

## 2021-12-02 PROCEDURE — 85025 COMPLETE CBC W/AUTO DIFF WBC: CPT | Performed by: EMERGENCY MEDICINE

## 2021-12-02 PROCEDURE — 99284 EMERGENCY DEPT VISIT MOD MDM: CPT | Mod: CS,,, | Performed by: EMERGENCY MEDICINE

## 2021-12-02 RX ORDER — DROPERIDOL 2.5 MG/ML
0.62 INJECTION, SOLUTION INTRAMUSCULAR; INTRAVENOUS ONCE
Status: DISCONTINUED | OUTPATIENT
Start: 2021-12-02 | End: 2021-12-02

## 2021-12-02 RX ORDER — METOCLOPRAMIDE HYDROCHLORIDE 5 MG/ML
10 INJECTION INTRAMUSCULAR; INTRAVENOUS
Status: COMPLETED | OUTPATIENT
Start: 2021-12-02 | End: 2021-12-02

## 2021-12-02 RX ORDER — ACETAMINOPHEN 500 MG
1000 TABLET ORAL
Status: COMPLETED | OUTPATIENT
Start: 2021-12-02 | End: 2021-12-02

## 2021-12-02 RX ORDER — KETOROLAC TROMETHAMINE 30 MG/ML
10 INJECTION, SOLUTION INTRAMUSCULAR; INTRAVENOUS
Status: COMPLETED | OUTPATIENT
Start: 2021-12-02 | End: 2021-12-02

## 2021-12-02 RX ORDER — BUTALBITAL, ACETAMINOPHEN AND CAFFEINE 50; 325; 40 MG/1; MG/1; MG/1
1 TABLET ORAL EVERY 4 HOURS PRN
Qty: 20 TABLET | Refills: 0 | Status: SHIPPED | OUTPATIENT
Start: 2021-12-02 | End: 2022-01-01

## 2021-12-02 RX ADMIN — SODIUM CHLORIDE, SODIUM LACTATE, POTASSIUM CHLORIDE, AND CALCIUM CHLORIDE 1000 ML: .6; .31; .03; .02 INJECTION, SOLUTION INTRAVENOUS at 02:12

## 2021-12-02 RX ADMIN — ACETAMINOPHEN 1000 MG: 500 TABLET ORAL at 01:12

## 2021-12-02 RX ADMIN — METOCLOPRAMIDE 10 MG: 5 INJECTION, SOLUTION INTRAMUSCULAR; INTRAVENOUS at 02:12

## 2021-12-02 RX ADMIN — KETOROLAC TROMETHAMINE 10 MG: 30 INJECTION, SOLUTION INTRAMUSCULAR at 02:12

## 2022-02-07 ENCOUNTER — TELEPHONE (OUTPATIENT)
Dept: NEUROLOGY | Facility: CLINIC | Age: 47
End: 2022-02-07
Payer: MEDICAID

## 2022-02-22 ENCOUNTER — PATIENT MESSAGE (OUTPATIENT)
Dept: RESEARCH | Facility: HOSPITAL | Age: 47
End: 2022-02-22
Payer: MEDICAID

## 2022-02-23 ENCOUNTER — OFFICE VISIT (OUTPATIENT)
Dept: URGENT CARE | Facility: CLINIC | Age: 47
End: 2022-02-23
Payer: MEDICAID

## 2022-02-23 VITALS
BODY MASS INDEX: 26.68 KG/M2 | DIASTOLIC BLOOD PRESSURE: 74 MMHG | RESPIRATION RATE: 18 BRPM | HEART RATE: 71 BPM | OXYGEN SATURATION: 98 % | SYSTOLIC BLOOD PRESSURE: 115 MMHG | WEIGHT: 170 LBS | TEMPERATURE: 98 F | HEIGHT: 67 IN

## 2022-02-23 DIAGNOSIS — J06.9 UPPER RESPIRATORY VIRUS: Primary | ICD-10-CM

## 2022-02-23 LAB
CTP QC/QA: YES
SARS-COV-2 RDRP RESP QL NAA+PROBE: NEGATIVE

## 2022-02-23 PROCEDURE — 99213 OFFICE O/P EST LOW 20 MIN: CPT | Mod: S$GLB,CS,, | Performed by: NURSE PRACTITIONER

## 2022-02-23 PROCEDURE — 1159F MED LIST DOCD IN RCRD: CPT | Mod: CPTII,S$GLB,, | Performed by: NURSE PRACTITIONER

## 2022-02-23 PROCEDURE — 1159F PR MEDICATION LIST DOCUMENTED IN MEDICAL RECORD: ICD-10-PCS | Mod: CPTII,S$GLB,, | Performed by: NURSE PRACTITIONER

## 2022-02-23 PROCEDURE — 3074F PR MOST RECENT SYSTOLIC BLOOD PRESSURE < 130 MM HG: ICD-10-PCS | Mod: CPTII,S$GLB,, | Performed by: NURSE PRACTITIONER

## 2022-02-23 PROCEDURE — 1160F RVW MEDS BY RX/DR IN RCRD: CPT | Mod: CPTII,S$GLB,, | Performed by: NURSE PRACTITIONER

## 2022-02-23 PROCEDURE — 3008F BODY MASS INDEX DOCD: CPT | Mod: CPTII,S$GLB,, | Performed by: NURSE PRACTITIONER

## 2022-02-23 PROCEDURE — 3008F PR BODY MASS INDEX (BMI) DOCUMENTED: ICD-10-PCS | Mod: CPTII,S$GLB,, | Performed by: NURSE PRACTITIONER

## 2022-02-23 PROCEDURE — 3078F PR MOST RECENT DIASTOLIC BLOOD PRESSURE < 80 MM HG: ICD-10-PCS | Mod: CPTII,S$GLB,, | Performed by: NURSE PRACTITIONER

## 2022-02-23 PROCEDURE — 3074F SYST BP LT 130 MM HG: CPT | Mod: CPTII,S$GLB,, | Performed by: NURSE PRACTITIONER

## 2022-02-23 PROCEDURE — 1160F PR REVIEW ALL MEDS BY PRESCRIBER/CLIN PHARMACIST DOCUMENTED: ICD-10-PCS | Mod: CPTII,S$GLB,, | Performed by: NURSE PRACTITIONER

## 2022-02-23 PROCEDURE — U0002: ICD-10-PCS | Mod: QW,S$GLB,, | Performed by: NURSE PRACTITIONER

## 2022-02-23 PROCEDURE — 99213 PR OFFICE/OUTPT VISIT, EST, LEVL III, 20-29 MIN: ICD-10-PCS | Mod: S$GLB,CS,, | Performed by: NURSE PRACTITIONER

## 2022-02-23 PROCEDURE — 3078F DIAST BP <80 MM HG: CPT | Mod: CPTII,S$GLB,, | Performed by: NURSE PRACTITIONER

## 2022-02-23 PROCEDURE — U0002 COVID-19 LAB TEST NON-CDC: HCPCS | Mod: QW,S$GLB,, | Performed by: NURSE PRACTITIONER

## 2022-02-23 RX ORDER — HYDROCODONE BITARTRATE AND ACETAMINOPHEN 10; 325 MG/1; MG/1
TABLET ORAL
COMMUNITY
Start: 2021-04-23 | End: 2022-10-14

## 2022-02-23 RX ORDER — TIZANIDINE 2 MG/1
2 TABLET ORAL 3 TIMES DAILY
COMMUNITY
Start: 2021-10-04 | End: 2022-10-14

## 2022-02-23 RX ORDER — AMOXICILLIN AND CLAVULANATE POTASSIUM 875; 125 MG/1; MG/1
TABLET, FILM COATED ORAL
COMMUNITY
Start: 2021-04-23 | End: 2022-06-11

## 2022-02-23 RX ORDER — BENZONATATE 100 MG/1
CAPSULE ORAL
COMMUNITY
Start: 2021-08-05 | End: 2022-10-14

## 2022-02-23 RX ORDER — BUTALBITAL, ACETAMINOPHEN AND CAFFEINE 50; 325; 40 MG/1; MG/1; MG/1
TABLET ORAL
COMMUNITY
Start: 2021-12-02 | End: 2022-10-14

## 2022-02-23 NOTE — PROGRESS NOTES
"Subjective:       Patient ID: Donell Park is a 46 y.o. male.    Vitals:  height is 5' 7" (1.702 m) and weight is 77.1 kg (170 lb). His oral temperature is 98.1 °F (36.7 °C). His blood pressure is 115/74 and his pulse is 71. His respiration is 18 and oxygen saturation is 98%.     Chief Complaint: Cough, Sore Throat, and Otalgia    This is a 46 y.o. male who presents today with a chief complaint of  sore throat, right ear pain,cough, chills, Congestion, post nasal Drip.  Started 2 days ago,denies fever, body aches, denies  wheezing or shortness of breath, denies nausea, vomiting, diarrhea or abdominal pain, denies chest pain or dizziness positional lightheadedness, denies trouble swallowing, denies loss of taste or smell, or any other symptoms      Cough  This is a new problem. The problem has been gradually worsening. The problem occurs hourly. The cough is productive of sputum. Associated symptoms include chills, ear congestion, ear pain, nasal congestion, postnasal drip, rhinorrhea and a sore throat. Pertinent negatives include no chest pain, fever or wheezing. Nothing aggravates the symptoms. He has tried OTC cough suppressant for the symptoms. The treatment provided no relief. There is no history of environmental allergies.   Sore Throat   Associated symptoms include coughing and ear pain. Pertinent negatives include no abdominal pain or vomiting.   Otalgia   Associated symptoms include coughing, rhinorrhea and a sore throat. Pertinent negatives include no abdominal pain or vomiting.       Constitution: Positive for chills. Negative for fatigue and fever.   HENT: Positive for ear pain, postnasal drip and sore throat. Negative for sinus pain.    Cardiovascular: Negative for chest pain.   Respiratory: Positive for cough. Negative for chest tightness, sputum production and wheezing.    Gastrointestinal: Negative for abdominal pain, nausea, vomiting and constipation.   Allergic/Immunologic: Negative for " environmental allergies.   Neurological: Negative for dizziness and light-headedness.       Objective:      Physical Exam   Constitutional: He is oriented to person, place, and time. He appears well-developed. He is cooperative.  Non-toxic appearance. He does not appear ill. No distress.   HENT:   Head: Normocephalic and atraumatic.   Ears:   Right Ear: Hearing, tympanic membrane, external ear and ear canal normal.   Left Ear: Hearing, tympanic membrane, external ear and ear canal normal.   Nose: Nose normal. No mucosal edema, rhinorrhea or nasal deformity. No epistaxis. Right sinus exhibits no maxillary sinus tenderness and no frontal sinus tenderness. Left sinus exhibits no maxillary sinus tenderness and no frontal sinus tenderness.   Mouth/Throat: Uvula is midline, oropharynx is clear and moist and mucous membranes are normal. No trismus in the jaw. Normal dentition. No uvula swelling. No oropharyngeal exudate, posterior oropharyngeal edema, posterior oropharyngeal erythema, tonsillar abscesses or cobblestoning.       Eyes: Conjunctivae and lids are normal. No scleral icterus.   Neck: Trachea normal and phonation normal. Neck supple. No edema present. No erythema present. No neck rigidity present.   Cardiovascular: Normal rate, regular rhythm, normal heart sounds and normal pulses.   Pulmonary/Chest: Effort normal and breath sounds normal. No respiratory distress. He has no decreased breath sounds. He has no rhonchi.   Abdominal: Normal appearance.   Musculoskeletal: Normal range of motion.         General: No deformity. Normal range of motion.   Lymphadenopathy:     He has no cervical adenopathy.   Neurological: He is alert and oriented to person, place, and time. He exhibits normal muscle tone. Coordination normal.   Skin: Skin is warm, dry, intact, not diaphoretic and not pale.   Psychiatric: His speech is normal and behavior is normal. Judgment and thought content normal.   Nursing note and vitals  reviewed.        Results for orders placed or performed in visit on 02/23/22   POCT COVID-19 Rapid Screening   Result Value Ref Range    POC Rapid COVID Negative Negative     Acceptable Yes          Patient in no acute distress.  Vitals reassuring.  Negative COVID-19 results discussed with patient in detail.  Detailed education provided about COVID 19 precautions and recommendations as per CDC guidelines.  Over-the-counter medication discussed in detail.  Discussed results/diagnosis/plan in depth with patient in clinic. Strict precautions given to patient to monitor for worsening signs and symptoms. Advised to follow up with primary.All questions answered. Strict ER precautions given. If your symptoms worsens or fail to improve you should go to the Emergency Room. Discharge and follow-up instructions given verbally/printed. Discharge and follow-up instructions discussed with the patient who expressed understanding and willingness to comply with my recommendations.Patient voiced understanding and in agreement with current treatment plan.     Please be advised this text was dictated with [email or web address] software and may contain errors due to translation.      Assessment:       1. Upper respiratory virus          Plan:         Upper respiratory virus  -     POCT COVID-19 Rapid Screening           Medical Decision Making:   History:   Old Records Summarized: records from clinic visits.  Initial Assessment:     46-year-old male  Presents with complaint of sore throat, postnasal drip, right ear pain, chills and cough started 2 days ago, patient reports he is vaccinated for COVID,  Denies fever or body aches, denies loss of smell or taste, denies nausea, vomiting, diarrhea or abdominal pain or any other symptoms  Differential Diagnosis:   Differential diagnoses included but not limited to acute URI, allergic or seasonal rhinitis, viral pharyngitis, bacterial pharyngitis or tonsillitis, tonsillitis, influenza, acute  bacterial rhinosinusitis    Clinical Tests:   Lab Tests: Ordered and Reviewed       <> Summary of Lab:  COVID test negative  Urgent Care Management:   Patient in no acute distress.  Vitals reassuring.  No oropharyngeal erythema or edema noted.  No lymphadenopathy noted.  Physical examination benign.  Lungs CTA.  COVID test negative.  Over-the-counter medications discussed with patient in detail.Patient voiced understanding and in agreement with current treatment plan.         Patient Instructions   PLEASE READ YOUR DISCHARGE INSTRUCTIONS ENTIRELY AS IT CONTAINS IMPORTANT INFORMATION.      Please drink plenty of fluids.    Please get plenty of rest.    Please return here or go to the Emergency Department for any concerns or worsening of condition.    Please take an over the counter antihistamine medication (allegra/Claritin/Zyrtec) of your choice as directed.    Try an over the counter decongestant like Mucinex D or Sudafed. You buy this behind the pharmacy counter    If you do have Hypertension or palpitations, it is safe to take Coricidin HBP for relief of sinus symptoms.    If not allergic, please take over the counter Tylenol (Acetaminophen) and/or Motrin (Ibuprofen) as directed for control of pain and/or fever.  Please follow up with your primary care doctor or specialist as needed.    Sore throat recommendations: Warm fluids, warm salt water gargles, throat lozenges, tea, honey, soup, rest, hydration.    Use over the counter flonase: one spray each nostril twice daily OR two sprays each nostril once daily.     If you  smoke, please stop smoking.      Please return or see your primary care doctor if you develop new or worsening symptoms.     Please arrange follow up with your primary medical clinic as soon as possible. You must understand that you've received an Urgent Care treatment only and that you may be released before all of your medical problems are known or treated. You, the patient, will arrange for  follow up as instructed. If your symptoms worsen or fail to improve you should go to the Emergency Room.

## 2022-03-08 ENCOUNTER — OFFICE VISIT (OUTPATIENT)
Dept: URGENT CARE | Facility: CLINIC | Age: 47
End: 2022-03-08
Payer: MEDICAID

## 2022-03-08 VITALS
OXYGEN SATURATION: 98 % | SYSTOLIC BLOOD PRESSURE: 129 MMHG | RESPIRATION RATE: 18 BRPM | DIASTOLIC BLOOD PRESSURE: 79 MMHG | WEIGHT: 161 LBS | BODY MASS INDEX: 25.27 KG/M2 | TEMPERATURE: 98 F | HEART RATE: 64 BPM | HEIGHT: 67 IN

## 2022-03-08 DIAGNOSIS — R07.9 RIGHT-SIDED CHEST PAIN: ICD-10-CM

## 2022-03-08 DIAGNOSIS — R09.1 PLEURISY: Primary | ICD-10-CM

## 2022-03-08 PROCEDURE — 3074F PR MOST RECENT SYSTOLIC BLOOD PRESSURE < 130 MM HG: ICD-10-PCS | Mod: CPTII,S$GLB,,

## 2022-03-08 PROCEDURE — 3078F DIAST BP <80 MM HG: CPT | Mod: CPTII,S$GLB,,

## 2022-03-08 PROCEDURE — 3078F PR MOST RECENT DIASTOLIC BLOOD PRESSURE < 80 MM HG: ICD-10-PCS | Mod: CPTII,S$GLB,,

## 2022-03-08 PROCEDURE — 1159F MED LIST DOCD IN RCRD: CPT | Mod: CPTII,S$GLB,,

## 2022-03-08 PROCEDURE — 3008F BODY MASS INDEX DOCD: CPT | Mod: CPTII,S$GLB,,

## 2022-03-08 PROCEDURE — 71046 XR CHEST PA AND LATERAL: ICD-10-PCS | Mod: FY,S$GLB,, | Performed by: RADIOLOGY

## 2022-03-08 PROCEDURE — 71046 X-RAY EXAM CHEST 2 VIEWS: CPT | Mod: FY,S$GLB,, | Performed by: RADIOLOGY

## 2022-03-08 PROCEDURE — 3074F SYST BP LT 130 MM HG: CPT | Mod: CPTII,S$GLB,,

## 2022-03-08 PROCEDURE — 93010 ELECTROCARDIOGRAM REPORT: CPT | Mod: S$GLB,,, | Performed by: INTERNAL MEDICINE

## 2022-03-08 PROCEDURE — 3008F PR BODY MASS INDEX (BMI) DOCUMENTED: ICD-10-PCS | Mod: CPTII,S$GLB,,

## 2022-03-08 PROCEDURE — 93005 ELECTROCARDIOGRAM TRACING: CPT | Mod: S$GLB,,,

## 2022-03-08 PROCEDURE — 99214 OFFICE O/P EST MOD 30 MIN: CPT | Mod: S$GLB,,,

## 2022-03-08 PROCEDURE — 93010 EKG 12-LEAD: ICD-10-PCS | Mod: S$GLB,,, | Performed by: INTERNAL MEDICINE

## 2022-03-08 PROCEDURE — 93005 EKG 12-LEAD: ICD-10-PCS | Mod: S$GLB,,,

## 2022-03-08 PROCEDURE — 1159F PR MEDICATION LIST DOCUMENTED IN MEDICAL RECORD: ICD-10-PCS | Mod: CPTII,S$GLB,,

## 2022-03-08 PROCEDURE — 1160F RVW MEDS BY RX/DR IN RCRD: CPT | Mod: CPTII,S$GLB,,

## 2022-03-08 PROCEDURE — 99214 PR OFFICE/OUTPT VISIT, EST, LEVL IV, 30-39 MIN: ICD-10-PCS | Mod: S$GLB,,,

## 2022-03-08 PROCEDURE — 1160F PR REVIEW ALL MEDS BY PRESCRIBER/CLIN PHARMACIST DOCUMENTED: ICD-10-PCS | Mod: CPTII,S$GLB,,

## 2022-03-08 NOTE — PATIENT INSTRUCTIONS
PLEASE READ YOUR DISCHARGE INSTRUCTIONS ENTIRELY AS IT CONTAINS IMPORTANT INFORMATION.      Please drink plenty of fluids.    Please get plenty of rest.    Please return here or go to the Emergency Department for any concerns or worsening of condition.      Tylenol or ibuprofen can also be used as directed for pain and fever unless you have an allergy to them or medical condition such as stomach ulcers, kidney or liver disease or blood thinners etc for which you should not be taking these type of medications.   YOU CAN ALTERNATE TYLENOL AND IBUPROFEN EVERY 3-4 HOURS. Take 1000mg (2 pills) of Extra Strength Acetaminophen (Tylenol) every 6 hours and 600mg (3 pills) of Ibuprofen (Motrin/Advil) every 6 hours, alternating the two so that every 3 hours you take one or the other. Start with the Tylenol, then 3 hours later take the Ibuprofen, then 3 hours later take the Tylenol again, and so on.       Please return or see your primary care doctor if you develop new or worsening symptoms.     Please arrange follow up with your primary medical clinic as soon as possible. You must understand that you've received an Urgent Care treatment only and that you may be released before all of your medical problems are known or treated. You, the patient, will arrange for follow up as instructed. If your symptoms worsen or fail to improve you should go to the Emergency Room.

## 2022-03-08 NOTE — PROGRESS NOTES
"Subjective:       Patient ID: Donell Park is a 46 y.o. male.    Vitals:  height is 5' 7" (1.702 m) and weight is 73 kg (161 lb). His temperature is 97.9 °F (36.6 °C). His blood pressure is 129/79 and his pulse is 64. His respiration is 18 and oxygen saturation is 98%.     Chief Complaint: Chest Pain    47 yo male presents to urgent care for evaluation. Patient c/o right sided chest pain x 1 week. Describes it as sharp pain. Worse with inhalation and deep breaths. Denies any recent illness except for URI at the end of February. Patient does admit to smoking cigarettes and marijuana occasionally. Patient denies left sided chest pain/pressure, SOB, n/v, diaphoresis, abdominal pain, weakness, and other associated symptoms.     Chest Pain   This is a new problem. The current episode started 1 to 4 weeks ago. The onset quality is sudden. The problem occurs constantly. The problem has been unchanged. The pain is present in the lateral region. The pain is at a severity of 8/10. The pain is moderate. The quality of the pain is described as sharp. Pertinent negatives include no abdominal pain, cough, dizziness, fever, headaches, nausea, palpitations, shortness of breath, sputum production, syncope or vomiting. The pain is aggravated by nothing. He has tried nothing for the symptoms. Risk factors include smoking/tobacco exposure.       Constitution: Negative for chills, fatigue and fever.   HENT: Negative for ear pain, ear discharge, congestion, postnasal drip, sinus pain, sore throat and trouble swallowing.    Neck: Negative for neck pain and neck stiffness.   Cardiovascular: Positive for chest pain (right sided). Negative for palpitations, sob on exertion and passing out.   Eyes: Negative for eye pain.   Respiratory: Negative for cough, sputum production, shortness of breath and wheezing.    Gastrointestinal: Negative for abdominal pain, nausea, vomiting and diarrhea.   Neurological: Negative for dizziness and " headaches.       Objective:      Physical Exam   Constitutional: He is oriented to person, place, and time. He appears well-developed. He is cooperative.  Non-toxic appearance. He does not appear ill. No distress.      Comments:Sitting comfortably in exam room, well appearing. No acute distress.     HENT:   Head: Normocephalic and atraumatic.   Ears:   Right Ear: Hearing, tympanic membrane, external ear and ear canal normal.   Left Ear: Hearing, tympanic membrane, external ear and ear canal normal.   Nose: Nose normal. No mucosal edema, rhinorrhea or nasal deformity. No epistaxis. Right sinus exhibits no maxillary sinus tenderness and no frontal sinus tenderness. Left sinus exhibits no maxillary sinus tenderness and no frontal sinus tenderness.   Mouth/Throat: Uvula is midline, oropharynx is clear and moist and mucous membranes are normal. No trismus in the jaw. Normal dentition. No uvula swelling. No posterior oropharyngeal erythema.   Eyes: Conjunctivae and lids are normal. Right eye exhibits no discharge. Left eye exhibits no discharge. No scleral icterus.   Neck: Trachea normal and phonation normal. Neck supple.   Cardiovascular: Normal rate, regular rhythm, normal heart sounds and normal pulses.   Pulmonary/Chest: Effort normal and breath sounds normal. No respiratory distress.   Abdominal: Normal appearance and bowel sounds are normal. He exhibits no distension, no pulsatile midline mass and no mass. Soft. There is no abdominal tenderness.   Musculoskeletal: Normal range of motion.         General: No deformity. Normal range of motion.   Neurological: He is alert and oriented to person, place, and time. He exhibits normal muscle tone. Coordination normal.   Skin: Skin is warm, dry, intact, not diaphoretic and not pale.   Psychiatric: His speech is normal and behavior is normal. Judgment and thought content normal.   Nursing note and vitals reviewed.    EKG: Normal sinus rhythm. No ST segment changes. Rate of  60 bpm    XR CHEST PA AND LATERAL    Result Date: 3/8/2022  EXAMINATION: XR CHEST PA AND LATERAL CLINICAL HISTORY: Chest pain, unspecified TECHNIQUE: PA and lateral views of the chest were performed. COMPARISON: Chest radiograph performed 08/05/2021 FINDINGS: Cardiomediastinal contours appear to be within normal limits.  Lungs appear essentially clear.  No definite pneumothorax or pleural effusion.  No acute findings identified in the visualized abdomen.  Osseous and soft tissue structures appear without definite acute change.     No convincing evidence of acute cardiopulmonary disease.     Electronically signed by: Erick Andrew Date:    03/08/2022 Time:    17:21        Assessment:       1. Pleurisy    2. Right-sided chest pain          Plan:       Patient well appearing, vitals stable. Patient with c/o sharp, right sided chest pain, worse with inhalation and deep breathing. Differential includes but is not limited to pleurisy vs costochondritis vs ACS/STEMI vs pneumonia vs viral URI. Reviewed normal EKG and CXR with patient. Discussed pleurisy as likely cause of patients symptoms given recent illness and patient is a current smoker. Discussed treatment which includes otc medications and home remedies. Provided educational handouts. Provided strict RTC and ER precautions. Patient verbalized understanding to the above and had no further questions. Exited exam room in NAD.     Pleurisy    Right-sided chest pain  -     XR CHEST PA AND LATERAL; Future; Expected date: 03/08/2022      Patient Instructions   PLEASE READ YOUR DISCHARGE INSTRUCTIONS ENTIRELY AS IT CONTAINS IMPORTANT INFORMATION.      Please drink plenty of fluids.    Please get plenty of rest.    Please return here or go to the Emergency Department for any concerns or worsening of condition.      Tylenol or ibuprofen can also be used as directed for pain and fever unless you have an allergy to them or medical condition such as stomach ulcers, kidney or  liver disease or blood thinners etc for which you should not be taking these type of medications.   YOU CAN ALTERNATE TYLENOL AND IBUPROFEN EVERY 3-4 HOURS. Take 1000mg (2 pills) of Extra Strength Acetaminophen (Tylenol) every 6 hours and 600mg (3 pills) of Ibuprofen (Motrin/Advil) every 6 hours, alternating the two so that every 3 hours you take one or the other. Start with the Tylenol, then 3 hours later take the Ibuprofen, then 3 hours later take the Tylenol again, and so on.       Please return or see your primary care doctor if you develop new or worsening symptoms.     Please arrange follow up with your primary medical clinic as soon as possible. You must understand that you've received an Urgent Care treatment only and that you may be released before all of your medical problems are known or treated. You, the patient, will arrange for follow up as instructed. If your symptoms worsen or fail to improve you should go to the Emergency Room.

## 2022-03-22 ENCOUNTER — TELEPHONE (OUTPATIENT)
Dept: INTERNAL MEDICINE | Facility: CLINIC | Age: 47
End: 2022-03-22
Payer: MEDICAID

## 2022-03-22 NOTE — TELEPHONE ENCOUNTER
Patient was informed that I can only see the schedule six months in advance and Dr. Resendiz patients PCP does not have any available appointment at this time. Patient declined next available clinic appointment. He states he will call back at a later time when schedule open up. Patient declined visit with another provider.

## 2022-03-22 NOTE — TELEPHONE ENCOUNTER
----- Message from Lashawn Salomon sent at 3/22/2022 10:35 AM CDT -----  Contact: ADAIR CHANG [[id number]]  Type: Call Back    Who called:ADAIR CHANG [[id number]]    What is the request in detail: Patient is requesting a call back in regards to scheduling his annual. Please advise.     Can the clinic reply by [facility]? No    Would the patient rather a call back or a response via [facility]? Call back     Best call back number: [phone] (mobile)    Additional Information:

## 2022-03-30 ENCOUNTER — TELEPHONE (OUTPATIENT)
Dept: INTERNAL MEDICINE | Facility: CLINIC | Age: 47
End: 2022-03-30
Payer: MEDICAID

## 2022-03-30 NOTE — TELEPHONE ENCOUNTER
----- Message from Nitesh De La Rosa sent at 3/30/2022  2:37 PM CDT -----  Regarding: Urgent Appt Request  Name of Who is Calling: ADAIR CHANG [[id number]]           What is the request in detail: Patient is requesting a call back to schedule an urgent appointment.  Patient is experiencing diarrhea and blood in stool.  Please assist.           Can the clinic reply by [facility]: NO           What Number to Call Back if not in [facility]: [phone]

## 2022-03-30 NOTE — TELEPHONE ENCOUNTER
"Returned pt's call.  Pt states he began with watery diarrhea and has noticed blood in stool. Pt unclear about how much blood is in the toilet as he doesn't like to look due to gag reflex. He said it's "not that much" and doesn't want to go to the ER. Denies fever. Made appt for pt tomorrow AM with available provider.     Instructed pt to go to the ER for increased ectal bleeding, vomiting blood, or fever.  "

## 2022-03-31 ENCOUNTER — OFFICE VISIT (OUTPATIENT)
Dept: INTERNAL MEDICINE | Facility: CLINIC | Age: 47
End: 2022-03-31
Attending: INTERNAL MEDICINE
Payer: MEDICAID

## 2022-03-31 ENCOUNTER — TELEPHONE (OUTPATIENT)
Dept: INTERNAL MEDICINE | Facility: CLINIC | Age: 47
End: 2022-03-31

## 2022-03-31 ENCOUNTER — HOSPITAL ENCOUNTER (OUTPATIENT)
Dept: RADIOLOGY | Facility: OTHER | Age: 47
Discharge: HOME | End: 2022-03-31
Attending: INTERNAL MEDICINE
Payer: MEDICAID

## 2022-03-31 VITALS
DIASTOLIC BLOOD PRESSURE: 86 MMHG | HEART RATE: 99 BPM | HEIGHT: 67 IN | SYSTOLIC BLOOD PRESSURE: 108 MMHG | BODY MASS INDEX: 27.06 KG/M2 | WEIGHT: 172.38 LBS | OXYGEN SATURATION: 98 %

## 2022-03-31 DIAGNOSIS — R10.31 RIGHT LOWER QUADRANT PAIN: ICD-10-CM

## 2022-03-31 DIAGNOSIS — K62.5 BRBPR (BRIGHT RED BLOOD PER RECTUM): Primary | ICD-10-CM

## 2022-03-31 PROCEDURE — 25500020 PHARM REV CODE 255: Performed by: INTERNAL MEDICINE

## 2022-03-31 PROCEDURE — 3074F SYST BP LT 130 MM HG: CPT | Mod: CPTII,,, | Performed by: INTERNAL MEDICINE

## 2022-03-31 PROCEDURE — 74177 CT ABD & PELVIS W/CONTRAST: CPT | Mod: TC

## 2022-03-31 PROCEDURE — 3079F DIAST BP 80-89 MM HG: CPT | Mod: CPTII,,, | Performed by: INTERNAL MEDICINE

## 2022-03-31 PROCEDURE — 1159F MED LIST DOCD IN RCRD: CPT | Mod: CPTII,,, | Performed by: INTERNAL MEDICINE

## 2022-03-31 PROCEDURE — 99214 OFFICE O/P EST MOD 30 MIN: CPT | Mod: S$PBB,,, | Performed by: INTERNAL MEDICINE

## 2022-03-31 PROCEDURE — 99999 PR PBB SHADOW E&M-EST. PATIENT-LVL IV: ICD-10-PCS | Mod: PBBFAC,,, | Performed by: INTERNAL MEDICINE

## 2022-03-31 PROCEDURE — 99214 OFFICE O/P EST MOD 30 MIN: CPT | Mod: PBBFAC,25 | Performed by: INTERNAL MEDICINE

## 2022-03-31 PROCEDURE — 3008F PR BODY MASS INDEX (BMI) DOCUMENTED: ICD-10-PCS | Mod: CPTII,,, | Performed by: INTERNAL MEDICINE

## 2022-03-31 PROCEDURE — A9698 NON-RAD CONTRAST MATERIALNOC: HCPCS | Performed by: INTERNAL MEDICINE

## 2022-03-31 PROCEDURE — 3079F PR MOST RECENT DIASTOLIC BLOOD PRESSURE 80-89 MM HG: ICD-10-PCS | Mod: CPTII,,, | Performed by: INTERNAL MEDICINE

## 2022-03-31 PROCEDURE — 74177 CT ABDOMEN PELVIS WITH CONTRAST: ICD-10-PCS | Mod: 26,,, | Performed by: RADIOLOGY

## 2022-03-31 PROCEDURE — 1159F PR MEDICATION LIST DOCUMENTED IN MEDICAL RECORD: ICD-10-PCS | Mod: CPTII,,, | Performed by: INTERNAL MEDICINE

## 2022-03-31 PROCEDURE — 99999 PR PBB SHADOW E&M-EST. PATIENT-LVL IV: CPT | Mod: PBBFAC,,, | Performed by: INTERNAL MEDICINE

## 2022-03-31 PROCEDURE — 99214 PR OFFICE/OUTPT VISIT, EST, LEVL IV, 30-39 MIN: ICD-10-PCS | Mod: S$PBB,,, | Performed by: INTERNAL MEDICINE

## 2022-03-31 PROCEDURE — 74177 CT ABD & PELVIS W/CONTRAST: CPT | Mod: 26,,, | Performed by: RADIOLOGY

## 2022-03-31 PROCEDURE — 3008F BODY MASS INDEX DOCD: CPT | Mod: CPTII,,, | Performed by: INTERNAL MEDICINE

## 2022-03-31 PROCEDURE — 3074F PR MOST RECENT SYSTOLIC BLOOD PRESSURE < 130 MM HG: ICD-10-PCS | Mod: CPTII,,, | Performed by: INTERNAL MEDICINE

## 2022-03-31 RX ADMIN — IOHEXOL 1000 ML: 9 SOLUTION ORAL at 11:03

## 2022-03-31 RX ADMIN — IOHEXOL 75 ML: 350 INJECTION, SOLUTION INTRAVENOUS at 12:03

## 2022-03-31 NOTE — PROGRESS NOTES
"Subjective:       Patient ID: Donell Park is a 46 y.o. male.    Chief Complaint: Diarrhea (BLOOD IN STOOL)    Here for urgent visit    2 days prior woke in the middle of the night with diarrhea, numerous bowel movements, with associated nausea vomiting. Developed right groin pain that has been constant with sudden sharp shooting component, subjective F/C, some positional dizziness. No SOB. No new meds. No sick contacts. No similar prior presentation. No family hx of colon CA.  Had one episode of discomfort with urination but denies urinary frequency, urinary urgency, decreased force of stream, incomplete emptying of bladder, post void dribble, nocturia, or gross hematuria.             Review of Systems   Constitutional: Negative for appetite change, chills, fever and unexpected weight change.   HENT: Negative for hearing loss, sore throat and trouble swallowing.    Eyes: Negative for visual disturbance.   Respiratory: Negative for cough, chest tightness and shortness of breath.    Cardiovascular: Negative for chest pain and leg swelling.   Gastrointestinal: Positive for anal bleeding, blood in stool, diarrhea and nausea. Negative for abdominal pain, constipation and vomiting.   Endocrine: Negative for polydipsia and polyuria.   Genitourinary: Negative for decreased urine volume, difficulty urinating, dysuria, frequency and urgency.   Musculoskeletal: Negative for gait problem.   Skin: Negative for rash.   Neurological: Negative for dizziness and numbness.   Psychiatric/Behavioral: The patient is not nervous/anxious.        Objective:      Vitals:    03/31/22 0957   BP: 108/86   Pulse: 99   SpO2: 98%   Weight: 78.2 kg (172 lb 6.4 oz)   Height: 5' 7" (1.702 m)      Physical Exam  Constitutional:       General: He is in acute distress.      Appearance: He is well-developed. He is not toxic-appearing.   HENT:      Head: Normocephalic and atraumatic.      Mouth/Throat:      Pharynx: No oropharyngeal exudate. "   Eyes:      General: No scleral icterus.     Conjunctiva/sclera: Conjunctivae normal.      Pupils: Pupils are equal, round, and reactive to light.   Neck:      Thyroid: No thyromegaly.   Cardiovascular:      Rate and Rhythm: Normal rate and regular rhythm.      Heart sounds: Normal heart sounds. No murmur heard.  Pulmonary:      Effort: Pulmonary effort is normal.      Breath sounds: Normal breath sounds. No wheezing or rales.   Abdominal:      General: There is no distension.      Palpations: Abdomen is soft.      Tenderness: There is abdominal tenderness in the right lower quadrant, periumbilical area and suprapubic area. There is guarding. There is no right CVA tenderness, left CVA tenderness or rebound.      Hernia: No hernia is present.   Musculoskeletal:         General: No tenderness.   Lymphadenopathy:      Cervical: No cervical adenopathy.   Skin:     General: Skin is warm and dry.   Neurological:      Mental Status: He is alert and oriented to person, place, and time.   Psychiatric:         Behavior: Behavior normal.         Assessment:       1. BRBPR (bright red blood per rectum)    2. Right lower quadrant pain        Plan:       Donell was seen today for diarrhea.    Diagnoses and all orders for this visit:    BRBPR (bright red blood per rectum)  -     CBC Auto Differential; Future  -     Comprehensive Metabolic Panel; Future  -     Ambulatory referral/consult to Gastroenterology; Future    Right lower quadrant pain  -     CT Abdomen Pelvis With Contrast; Future  -     CBC Auto Differential; Future  -     Comprehensive Metabolic Panel; Future  -     Urinalysis, Reflex to Urine Culture Urine, Clean Catch; Future           Santana Alexander MD  Internal Medicine-[facility]        Side effects of medication(s) were discussed in detail and patient voiced understanding.  Patient will call back for any issues or complications.

## 2022-03-31 NOTE — PROGRESS NOTES
Good news! No signs of colitis, appendicitis, or diverticulitis. Symptoms and CT all together is most consistent with a viral stom ache bug. Since vomiting and diarrhea are already improved you are out of the woods. I recommend applying some heat to your abdomen

## 2022-03-31 NOTE — LETTER
March 31, 2022      [facility]  [address]  [facility] [address]  Phone: [phone]  Fax: [phone]       Patient: Donell Park   YOB: 1975  Date of Visit: 03/31/2022    To Whom It May Concern:    Elizabeth Park  was at [facility] on 03/31/2022. Please excuse this patient from 3/30/2022 through 4/1/2022. If you have any questions or concerns, or if I can be of further assistance, please do not hesitate to contact me.    Sincerely,    Vi Riley MA

## 2022-03-31 NOTE — TELEPHONE ENCOUNTER
----- Message from Ilya Santo sent at 3/31/2022  2:39 PM CDT -----  Regarding: Results  Contact: ADAIR CHANG [[id number]]  Name of Who is Calling: ADAIR CHANG [[id number]]      What is the request in detail: Would like to speak with staff in regards to test results and possible medication. Please advise      Can the clinic reply by [facility]: no      What [facility]: [phone]

## 2022-04-01 NOTE — TELEPHONE ENCOUNTER
""Pt sent CT results at 2:25. No antibiotics necessary. Diarrhea and emesis improved by time of appt. Presentation and normal CT suggest abdominal wall muscle wall pain. TIme and heating pad best. NSAIDS may aggravate his GI tract. Tylenol is okay."                       Spoke to pt and let him know what Dr. Santoro advised.   Pt asked how long it will take to resolve. I let him know to give it a few days and let us know Monday if he's not improving, and to be seen right away if things get worse or severe.  Pt verbalized understanding and had no further concerns or questions.      "

## 2022-04-01 NOTE — TELEPHONE ENCOUNTER
Pt sent CT results at 2:25. No antibiotics necessary. Diarrhea and emesis improved by time of appt. Presentation and normal CT suggest abdominal wall muscle wall pain. TIme and heating pad best. NSAIDS may aggravate his GI tract. Tylenol is okay.

## 2022-04-04 ENCOUNTER — NURSE TRIAGE (OUTPATIENT)
Dept: ADMINISTRATIVE | Facility: CLINIC | Age: 47
End: 2022-04-04
Payer: MEDICAID

## 2022-04-04 ENCOUNTER — HOSPITAL ENCOUNTER (EMERGENCY)
Facility: HOSPITAL | Age: 47
Discharge: HOME | End: 2022-04-04
Attending: EMERGENCY MEDICINE
Payer: MEDICAID

## 2022-04-04 VITALS
SYSTOLIC BLOOD PRESSURE: 146 MMHG | BODY MASS INDEX: 24.64 KG/M2 | HEART RATE: 94 BPM | HEIGHT: 67 IN | TEMPERATURE: 99 F | DIASTOLIC BLOOD PRESSURE: 84 MMHG | RESPIRATION RATE: 18 BRPM | WEIGHT: 157 LBS | OXYGEN SATURATION: 99 %

## 2022-04-04 DIAGNOSIS — R19.7 DIARRHEA, UNSPECIFIED TYPE: ICD-10-CM

## 2022-04-04 DIAGNOSIS — R10.31 ABDOMINAL PAIN, RIGHT LOWER QUADRANT: Primary | ICD-10-CM

## 2022-04-04 DIAGNOSIS — K52.9 ENTERITIS: ICD-10-CM

## 2022-04-04 LAB
ALBUMIN SERPL BCP-MCNC: 4.1 G/DL (ref 3.5–5.2)
ALP SERPL-CCNC: 84 U/L (ref 55–135)
ALT SERPL W/O P-5'-P-CCNC: 16 U/L (ref 10–44)
ANION GAP SERPL CALC-SCNC: 10 MMOL/L (ref 8–16)
AST SERPL-CCNC: 19 U/L (ref 10–40)
BASOPHILS # BLD AUTO: 0.04 K/UL (ref 0–0.2)
BASOPHILS NFR BLD: 0.3 % (ref 0–1.9)
BILIRUB SERPL-MCNC: 0.8 MG/DL (ref 0.1–1)
BILIRUB UR QL STRIP: NEGATIVE
BUN SERPL-MCNC: 10 MG/DL (ref 6–20)
CALCIUM SERPL-MCNC: 9.6 MG/DL (ref 8.7–10.5)
CHLORIDE SERPL-SCNC: 107 MMOL/L (ref 95–110)
CLARITY UR REFRACT.AUTO: CLEAR
CO2 SERPL-SCNC: 22 MMOL/L (ref 23–29)
COLOR UR AUTO: YELLOW
CREAT SERPL-MCNC: 0.9 MG/DL (ref 0.5–1.4)
DIFFERENTIAL METHOD: ABNORMAL
EOSINOPHIL # BLD AUTO: 0.1 K/UL (ref 0–0.5)
EOSINOPHIL NFR BLD: 0.6 % (ref 0–8)
ERYTHROCYTE [DISTWIDTH] IN BLOOD BY AUTOMATED COUNT: 13.6 % (ref 11.5–14.5)
EST. GFR  (AFRICAN AMERICAN): >60 ML/MIN/1.73 M^2
EST. GFR  (NON AFRICAN AMERICAN): >60 ML/MIN/1.73 M^2
GLUCOSE SERPL-MCNC: 93 MG/DL (ref 70–110)
GLUCOSE UR QL STRIP: NEGATIVE
HCT VFR BLD AUTO: 47.4 % (ref 40–54)
HGB BLD-MCNC: 16.6 G/DL (ref 14–18)
HGB UR QL STRIP: NEGATIVE
IMM GRANULOCYTES # BLD AUTO: 0.06 K/UL (ref 0–0.04)
IMM GRANULOCYTES NFR BLD AUTO: 0.5 % (ref 0–0.5)
KETONES UR QL STRIP: NEGATIVE
LACTATE SERPL-SCNC: 1.2 MMOL/L (ref 0.5–2.2)
LEUKOCYTE ESTERASE UR QL STRIP: NEGATIVE
LIPASE SERPL-CCNC: 27 U/L (ref 4–60)
LYMPHOCYTES # BLD AUTO: 2.1 K/UL (ref 1–4.8)
LYMPHOCYTES NFR BLD: 17.2 % (ref 18–48)
MCH RBC QN AUTO: 31 PG (ref 27–31)
MCHC RBC AUTO-ENTMCNC: 35 G/DL (ref 32–36)
MCV RBC AUTO: 88 FL (ref 82–98)
MONOCYTES # BLD AUTO: 1.2 K/UL (ref 0.3–1)
MONOCYTES NFR BLD: 9.5 % (ref 4–15)
NEUTROPHILS # BLD AUTO: 8.7 K/UL (ref 1.8–7.7)
NEUTROPHILS NFR BLD: 71.9 % (ref 38–73)
NITRITE UR QL STRIP: NEGATIVE
NRBC BLD-RTO: 0 /100 WBC
PH UR STRIP: >8 [PH] (ref 5–8)
PLATELET # BLD AUTO: 342 K/UL (ref 150–450)
PMV BLD AUTO: 10.9 FL (ref 9.2–12.9)
POTASSIUM SERPL-SCNC: 3.8 MMOL/L (ref 3.5–5.1)
PROT SERPL-MCNC: 7.6 G/DL (ref 6–8.4)
PROT UR QL STRIP: NEGATIVE
RBC # BLD AUTO: 5.36 M/UL (ref 4.6–6.2)
SODIUM SERPL-SCNC: 139 MMOL/L (ref 136–145)
SP GR UR STRIP: 1.02 (ref 1–1.03)
URN SPEC COLLECT METH UR: ABNORMAL
WBC # BLD AUTO: 12.14 K/UL (ref 3.9–12.7)

## 2022-04-04 PROCEDURE — 25000003 PHARM REV CODE 250: Performed by: PHYSICIAN ASSISTANT

## 2022-04-04 PROCEDURE — 85025 COMPLETE CBC W/AUTO DIFF WBC: CPT | Performed by: PHYSICIAN ASSISTANT

## 2022-04-04 PROCEDURE — 80053 COMPREHEN METABOLIC PANEL: CPT | Performed by: PHYSICIAN ASSISTANT

## 2022-04-04 PROCEDURE — 25500020 PHARM REV CODE 255: Performed by: EMERGENCY MEDICINE

## 2022-04-04 PROCEDURE — 63600175 PHARM REV CODE 636 W HCPCS: Performed by: PHYSICIAN ASSISTANT

## 2022-04-04 PROCEDURE — 83605 ASSAY OF LACTIC ACID: CPT | Performed by: PHYSICIAN ASSISTANT

## 2022-04-04 PROCEDURE — 99284 EMERGENCY DEPT VISIT MOD MDM: CPT | Mod: ,,, | Performed by: PHYSICIAN ASSISTANT

## 2022-04-04 PROCEDURE — 96361 HYDRATE IV INFUSION ADD-ON: CPT

## 2022-04-04 PROCEDURE — 81003 URINALYSIS AUTO W/O SCOPE: CPT | Performed by: PHYSICIAN ASSISTANT

## 2022-04-04 PROCEDURE — 83690 ASSAY OF LIPASE: CPT | Performed by: PHYSICIAN ASSISTANT

## 2022-04-04 PROCEDURE — 96374 THER/PROPH/DIAG INJ IV PUSH: CPT | Mod: 59

## 2022-04-04 PROCEDURE — 96375 TX/PRO/DX INJ NEW DRUG ADDON: CPT

## 2022-04-04 PROCEDURE — 99285 EMERGENCY DEPT VISIT HI MDM: CPT | Mod: 25

## 2022-04-04 PROCEDURE — 99284 PR EMERGENCY DEPT VISIT,LEVEL IV: ICD-10-PCS | Mod: ,,, | Performed by: PHYSICIAN ASSISTANT

## 2022-04-04 RX ORDER — MORPHINE SULFATE 2 MG/ML
6 INJECTION, SOLUTION INTRAMUSCULAR; INTRAVENOUS
Status: COMPLETED | OUTPATIENT
Start: 2022-04-04 | End: 2022-04-04

## 2022-04-04 RX ORDER — ONDANSETRON 2 MG/ML
4 INJECTION INTRAMUSCULAR; INTRAVENOUS
Status: COMPLETED | OUTPATIENT
Start: 2022-04-04 | End: 2022-04-04

## 2022-04-04 RX ADMIN — MORPHINE SULFATE 6 MG: 2 INJECTION, SOLUTION INTRAMUSCULAR; INTRAVENOUS at 04:04

## 2022-04-04 RX ADMIN — ONDANSETRON 4 MG: 2 INJECTION INTRAMUSCULAR; INTRAVENOUS at 04:04

## 2022-04-04 RX ADMIN — SODIUM CHLORIDE 1000 ML: 0.9 INJECTION, SOLUTION INTRAVENOUS at 04:04

## 2022-04-04 RX ADMIN — IOHEXOL 75 ML: 350 INJECTION, SOLUTION INTRAVENOUS at 05:04

## 2022-04-04 NOTE — TELEPHONE ENCOUNTER
Patient is calling, has not slept, having trouble walking. Was seen by Dr. Santoro and advised to follow up on Monday if not improving. Patient is very frustrated, states still has vomiting and diarrhea. But states worst problem is pain. Triage done- go to office now, ED offered, patient states he is tired of the ED, validated but aware of option if his pain is severed. patient is aware office opens in 10 minutes. Would like message sent to Dr. Resendiz. Instructed to call back for any further questions or concerns or worsening S/S. Verb understanding.   Reason for Disposition   SEVERE pain (e.g., excruciating, unable to do any normal activities)    Additional Information   Negative: Looks like a broken bone or dislocated joint (e.g., crooked or deformed)   Negative: Sounds like a life-threatening emergency to the triager   Negative: Chest pain   Negative: Difficulty breathing   Negative: Entire foot is cool or blue in comparison to other side   Negative: Unable to walk   Negative: Fever and red area (or area very tender to touch)   Negative: Swollen joint and fever   Negative: Thigh or calf pain in only one leg and present > 1 hour   Negative: Thigh, calf, or ankle swelling in only one leg   Negative: Thigh, calf, or ankle swelling in both legs, but one side is definitely more swollen   Negative: History of prior 'blood clot' in leg or lungs (i.e., deep vein thrombosis, pulmonary embolism)   Negative: History of inherited increased risk of blood clots (e.g., factor 5 Leiden, antithrombin 3, protein C or protein S deficiency, prothrombin mutation)   Negative: Major surgery in past month   Negative: Hip or leg fracture (broken bone) in past month (or had cast on leg or ankle in past month)   Negative: Illness requiring prolonged bedrest in past month (e.g., immobilization, long hospital stay)   Negative: Long-distance travel in past month (e.g., car, bus, train, plane; with trip lasting 6 or more  hours)   Negative: Cancer treatment in past six months (or has cancer now)   Negative: Patient sounds very sick or weak to the triager    Protocols used: LEG PAIN-A-[place]

## 2022-04-04 NOTE — TELEPHONE ENCOUNTER
Patient called back, and offered RR, accepted, warm transfer done to RR. No further questions or concerns at this time.

## 2022-04-04 NOTE — TELEPHONE ENCOUNTER
Pt states pain is 10+. Pt states he's still having diarrhea and vomitting and having slept in 4 days. Pt sates cold and hot sweats. Pt states he needs assistance from family because he's feeling weak and has not been eating. No high or low Fevers. Pt is requesting medication asap. Pt sates he's missing a lot of work .

## 2022-04-04 NOTE — TELEPHONE ENCOUNTER
Duplicate call    Reason for Disposition   Caller has cancelled the call before the first contact    Protocols used: NO CONTACT OR DUPLICATE CONTACT CALL-A-[place]

## 2022-04-04 NOTE — ED TRIAGE NOTES
Donell Park, a 46 y.o. male presents to the ED w/ complaint of abdominal pain. States pain started on 3/30, abdominal pain localized to left inguinal area. One episode of diarrhea w/ blood, described as bright red. Does not radiate, no relieving factors noted. Associated symptoms include loss of appetite, diarrhea, insomnia, chills fever. Last bowel movement today, still diarrhea.  Per pt seen for chief complaint twice w/ no answers. Denies chest pain, SOB.     Triage note:  Chief Complaint   Patient presents with    Abdominal Pain     Since 3/30, abd pain and bloody diarrhea.  Pt states pain is worse.       Review of patient's allergies indicates:  No Known Allergies  Past Medical History:   Diagnosis Date    Acute gastritis without hemorrhage 06/03/2019    aspirin induced    Addiction to drug     [facility]    ADHD (attention deficit hyperactivity disorder)     [facility]    Anxiety     Attempted suicide 06/20/19    pt stated attempted 2 weeks ago- aspirin    Depression     Hallucination     Heavy smoker (more than 20 cigarettes per day)     History of psychiatric hospitalization     History of rectal bleeding 2012    [facility] Colonoscopy without polyps.    Hx of psychiatric care     Major depression 6/24/2019    Major depressive disorder, recurrent, severe with psychotic features 6/25/2019    Migraine headache     Psychiatric problem     PTSD (post-traumatic stress disorder) 6/25/2019    Severe episode of recurrent major depressive disorder, with psychotic features 6/25/2019    Shingles 2013    Substance abuse     Suicide attempt     attempted twice    Suicide attempt by drug ingestion 9/16/2019    Suicide attempt by substance overdose 9/16/2019    Therapy

## 2022-04-04 NOTE — ED PROVIDER NOTES
Encounter Date: 4/4/2022       History     Chief Complaint   Patient presents with    Abdominal Pain     Since 3/30, abd pain and bloody diarrhea.  Pt states pain is worse.       The patient is a 46 year old male, who has a documented past medical history of gastritis, anxiety, depression, anxiety, suicidal ideations, intentional overdose, inpatient psychiatric hospitalization, substance abuse, migraines, and rectal bleeding. He presents to the ER for an emergent evaluation due to acute severe right lower abdominal pain with associated watery diarrhea and intermittent nausea x 5 days. He indicates that the pain is right inguinal, below the belt. He denies any testicle or genital pain/swelling. He states that the pain is 10/10 on the pain scale. He states that the pain course is constant. He states that the pain is worse with certain movements. He is unable to find a comfortable position. He has had difficulty sleeping due to the pain. He reports having 4-5 episodes of watery diarrhea daily. He went to his PCP about this last week and had unremarkable labs and CT abdomen/pelvis. Since then he states that symptoms have persisted and pain is worse. He denies any fever or chills. He is tolerating PO. He reports normal urination. He denies recent antibiotic use, hospitalization, international travel, or consumption of raw/undercooked foods.            Review of patient's allergies indicates:  No Known Allergies  Past Medical History:   Diagnosis Date    Acute gastritis without hemorrhage 06/03/2019    aspirin induced    Addiction to drug     [facility]    ADHD (attention deficit hyperactivity disorder)     a [facility]    Anxiety     Attempted suicide 06/20/19    pt stated attempted 2 weeks ago- aspirin    Depression     Hallucination     Heavy smoker (more than 20 cigarettes per day)     History of psychiatric hospitalization     History of rectal bleeding 2012    OSH Colonoscopy  without polyps.    Hx of psychiatric care     Major depression 6/24/2019    Major depressive disorder, recurrent, severe with psychotic features 6/25/2019    Migraine headache     Psychiatric problem     PTSD (post-traumatic stress disorder) 6/25/2019    Severe episode of recurrent major depressive disorder, with psychotic features 6/25/2019    Shingles 2013    Substance abuse     Suicide attempt     attempted twice    Suicide attempt by drug ingestion 9/16/2019    Suicide attempt by substance overdose 9/16/2019    Therapy      Past Surgical History:   Procedure Laterality Date    CYST REMOVAL N/A 2013    near heart    GANGLION CYST EXCISION Right     wrist     Family History   Problem Relation Age of Onset    Stroke Mother     Diabetes Father     Hypertension Father     Lung cancer Father     Lung cancer Maternal Grandmother     Lung cancer Maternal Grandfather     Lung cancer Paternal Grandmother     Lung cancer Paternal Grandfather     Lung cancer Maternal Uncle     Lung cancer Maternal Uncle     Depression Maternal Aunt      Social History     Tobacco Use    Smoking status: Current Every Day Smoker     Packs/day: 1.00     Types: Cigarettes     Start date: 1997    Smokeless tobacco: Current User   Substance Use Topics    Alcohol use: No    Drug use: Yes     Frequency: 3.0 times per week     Types: Marijuana     Comment: last used one month ago     Review of Systems   Constitutional: Negative for chills, diaphoresis and fever.   HENT: Negative for congestion, rhinorrhea and sore throat.    Eyes: Negative for visual disturbance.   Respiratory: Negative for cough, chest tightness and shortness of breath.    Cardiovascular: Negative for chest pain.   Gastrointestinal: Positive for abdominal pain, diarrhea, nausea and vomiting. Negative for abdominal distention, blood in stool and constipation.   Genitourinary: Negative for decreased urine volume, difficulty urinating, dysuria, flank  pain, frequency, hematuria, penile discharge, penile pain, penile swelling, scrotal swelling and testicular pain.   Musculoskeletal: Negative for back pain and gait problem.   Skin: Negative for color change.   Allergic/Immunologic: Negative for immunocompromised state.   Neurological: Negative for dizziness, syncope, light-headedness and headaches.   Hematological: Negative for adenopathy.   Psychiatric/Behavioral: Negative for confusion. The patient is nervous/anxious.        Physical Exam     Initial Vitals [04/04/22 1450]   BP Pulse Resp Temp SpO2   (!) 146/84 94 20 98.9 °F (37.2 °C) 99 %      MAP       --         Physical Exam    Nursing note and vitals reviewed.  Constitutional: He appears well-developed and well-nourished. He is not diaphoretic. He appears distressed.   Appears very uncomfortable    HENT:   Head: Normocephalic.   Moist oral mucosa    Eyes: Conjunctivae are normal. No scleral icterus.   Neck: Neck supple.   Pulmonary/Chest: No respiratory distress.   Abdominal: Abdomen is soft. He exhibits no distension. There is abdominal tenderness.   Localized tenderness to palpation over right inguinal region - pt guarding - unable to assess for inguinal hernia due to patient's pain unable to palpate   There is guarding.   Musculoskeletal:         General: Normal range of motion.      Cervical back: Neck supple.     Neurological: He is alert and oriented to person, place, and time. He has normal strength. No sensory deficit.   Skin: Skin is warm and dry.   No diaphoresis. No jaundice.    Psychiatric: He has a normal mood and affect. His behavior is normal.         ED Course   Procedures  Labs Reviewed   CBC W/ AUTO DIFFERENTIAL - Abnormal; Notable for the following components:       Result Value    Gran # (ANC) 8.7 (*)     Immature Grans (Abs) 0.06 (*)     Mono # 1.2 (*)     Lymph % 17.2 (*)     All other components within normal limits   COMPREHENSIVE METABOLIC PANEL - Abnormal; Notable for the following  components:    CO2 22 (*)     All other components within normal limits   URINALYSIS, REFLEX TO URINE CULTURE - Abnormal; Notable for the following components:    pH, UA >8.0 (*)     All other components within normal limits    Narrative:     Specimen Source->Urine   CULTURE, STOOL   CLOSTRIDIUM DIFFICILE   LIPASE   LACTIC ACID, PLASMA   WBC, STOOL   STOOL EXAM-OVA,CYSTS,PARASITES     Results for orders placed or performed during the hospital encounter of 04/04/22   CBC auto differential   Result Value Ref Range    WBC 12.14 3.90 - 12.70 K/uL    RBC 5.36 4.60 - 6.20 M/uL    Hemoglobin 16.6 14.0 - 18.0 g/dL    Hematocrit 47.4 40.0 - 54.0 %    MCV 88 82 - 98 fL    MCH 31.0 27.0 - 31.0 pg    MCHC 35.0 32.0 - 36.0 g/dL    RDW 13.6 11.5 - 14.5 %    Platelets 342 150 - 450 K/uL    MPV 10.9 9.2 - 12.9 fL    Immature Granulocytes 0.5 0.0 - 0.5 %    Gran # (ANC) 8.7 (H) 1.8 - 7.7 K/uL    Immature Grans (Abs) 0.06 (H) 0.00 - 0.04 K/uL    Lymph # 2.1 1.0 - 4.8 K/uL    Mono # 1.2 (H) 0.3 - 1.0 K/uL    Eos # 0.1 0.0 - 0.5 K/uL    Baso # 0.04 0.00 - 0.20 K/uL    nRBC 0 0 /100 WBC    Gran % 71.9 38.0 - 73.0 %    Lymph % 17.2 (L) 18.0 - 48.0 %    Mono % 9.5 4.0 - 15.0 %    Eosinophil % 0.6 0.0 - 8.0 %    Basophil % 0.3 0.0 - 1.9 %    Differential Method Automated    Comprehensive metabolic panel   Result Value Ref Range    Sodium 139 136 - 145 mmol/L    Potassium 3.8 3.5 - 5.1 mmol/L    Chloride 107 95 - 110 mmol/L    CO2 22 (L) 23 - 29 mmol/L    Glucose 93 70 - 110 mg/dL    BUN 10 6 - 20 mg/dL    Creatinine 0.9 0.5 - 1.4 mg/dL    Calcium 9.6 8.7 - 10.5 mg/dL    Total Protein 7.6 6.0 - 8.4 g/dL    Albumin 4.1 3.5 - 5.2 g/dL    Total Bilirubin 0.8 0.1 - 1.0 mg/dL    Alkaline Phosphatase 84 55 - 135 U/L    AST 19 10 - 40 U/L    ALT 16 10 - 44 U/L    Anion Gap 10 8 - 16 mmol/L    eGFR if African American >60.0 >60 mL/min/1.73 m^2    eGFR if non African American >60.0 >60 mL/min/1.73 m^2   Lipase   Result Value Ref Range    Lipase 27 4  - 60 U/L   Urinalysis, Reflex to Urine Culture Urine, Clean Catch    Specimen: Urine   Result Value Ref Range    Specimen UA Urine, Clean Catch     Color, UA Yellow Yellow, Straw, Rosalia    Appearance, UA Clear Clear    pH, UA >8.0 (A) 5.0 - 8.0    Specific Gravity, UA 1.025 1.005 - 1.030    Protein, UA Negative Negative    Glucose, UA Negative Negative    Ketones, UA Negative Negative    Bilirubin (UA) Negative Negative    Occult Blood UA Negative Negative    Nitrite, UA Negative Negative    Leukocytes, UA Negative Negative   Lactic acid, plasma   Result Value Ref Range    Lactate (Lactic Acid) 1.2 0.5 - 2.2 mmol/L            Imaging Results          CT Abdomen Pelvis With Contrast (Final result)  Result time 04/04/22 17:37:32    Final result by Yony Aldana MD (04/04/22 17:37:32)                 Impression:      1. Scattered fluid-filled small bowel loops, finding is nonspecific however early changes of nonspecific enteritis can present in this fashion, correlation however is needed given the non specificity.  There is slight liquid stool within the cecum, may reflect developing diarrheal illness.  2. Right nonobstructive nephrolithiasis.  3. Findings may reflect hepatic steatosis, correlation with LFTs recommended.  4. Additional findings above.    Electronically signed by resident: Keo Wright  Date:    04/04/2022  Time:    17:06    Electronically signed by: Yony Aldana MD  Date:    04/04/2022  Time:    17:37             Narrative:    EXAMINATION:  CT ABDOMEN PELVIS WITH CONTRAST    CLINICAL HISTORY:  RLQ abdominal pain (Age >= 14y);Acute severe right inguinal pain;    TECHNIQUE:  Low dose axial images, sagittal and coronal reformations were obtained from the lung bases to the pubic symphysis following the IV administration of 75 mL of Omnipaque 350 .  Oral contrast was not administered.    COMPARISON:  CT abdomen pelvis 03/31/2022, 04/22/2021.    FINDINGS:  LUNG BASES:  Images of the lower thorax  are remarkable for minimal dependent atelectasis.    HEPATOBILIARY:  The liver is minimally hypoattenuating, may be on the basis of contrast phase however correlation with LFTs recommended as findings could reflect steatosis.  The gallbladder is unremarkable.  There is no biliary dilation.    SPLEEN:  No splenomegaly.    PANCREAS:  No focal masses or ductal dilatation.    ADRENALS:   No adrenal nodules.    KIDNEYS/URETERS: The kidneys enhance symmetrically without hydronephrosis.  There is right nonobstructive nephrolithiasis.  Punctate low attenuating lesions arise from the kidneys bilaterally, too small for characterization.  The bilateral ureters are unable to be followed in their entirety to the urinary bladder, no definite calculi seen or secondary findings to suggest obstructive uropathy.    PELVIC ORGANS/BLADDER:  Unremarkable.    PERITONEUM / RETROPERITONEUM:  No free air or free fluid.    LYMPH NODES:  There are a few scattered shotty abdominal lymph nodes.    VESSELS:  Unremarkable.    GI TRACT: The large bowel is grossly unremarkable.  The appendix is unremarkable.  There is fluid within several mid to distal small bowel loops.    BONES AND SOFT TISSUES: Bilateral inguinal canals unremarkable.  Surgical clips in the bilateral testes.  No acute osseous abnormality.                                 Medications   morphine injection 6 mg (6 mg Intravenous Given 4/4/22 1645)   ondansetron injection 4 mg (4 mg Intravenous Given 4/4/22 1648)   sodium chloride 0.9% bolus 1,000 mL (0 mLs Intravenous Stopped 4/4/22 1749)   iohexoL (OMNIPAQUE 350) injection 75 mL (75 mLs Intravenous Given 4/4/22 1700)     Medical Decision Making:   History:   Old Medical Records: I decided to obtain old medical records.  Initial Assessment:   47 yo male, here c/o right lower abdominal/inguinl pain with associated diarrhea x 5 days    Differential Diagnosis:   Inguinal hernia, Appendicitis, colitis, renal lithiasis, enteritis, SBO,  abdominal strain, cholelithiasis, cholecystitis, hepatitis, infectious diarrhea, c diff. Etc   Clinical Tests:   Lab Tests: Ordered and Reviewed  Radiological Study: Ordered and Reviewed  ED Management:  Vital signs reviews - benign   Records reviewed   Pt reports feeling better after treatment in the ER   Pt reports not being able to provide specimen for stool culture while in the ER   Pt tolerating PO   Pt states that pain went from 10/10 to 5/10   I discussed the case in detail with the ER attending physician who reviewed records and current ED workup recommends discharge home with close outpatient follow up   Pt has ambulatory referral to GI ordered by his PCP in place   Pt advised to rest, hydrate, Tylenol as directed for pain   Pt advised to see PCP in the next 24-48 hours for re-evaluation and further management   Pt advised to follow up with GI as needed at next available   Pt advised to return to the ER promptly if worse in any way                       Clinical Impression:   Final diagnoses:  [R19.7] Diarrhea, unspecified type  [K52.9] Enteritis  [R10.31] Abdominal pain, right lower quadrant (Primary)          ED Disposition Condition    Discharge Stable        ED Prescriptions     None        Follow-up Information     Follow up With Specialties Details Why Contact Info Additional Information    Yenny Resendiz MD Internal Medicine Schedule an appointment as soon as possible for a visit in 2 days  [address] [facility]  [address]  [facility] [address]  [phone]       [facility] [address] Gastroenterology Schedule an appointment as soon as possible for a visit  Follow up with gastroenterology as needed [address] [facility] [address] [phone] [facility] - Main Building, 4th Floor Please park in [facility] and take Atrium elevator    [facility] Emergency Medicine  If symptoms worsen in any way [address] [facility]  [address]  [phone]            Nathan Gaston PA-C  04/04/22 3720

## 2022-05-12 ENCOUNTER — PATIENT MESSAGE (OUTPATIENT)
Dept: SMOKING CESSATION | Facility: CLINIC | Age: 47
End: 2022-05-12
Payer: MEDICAID

## 2022-06-11 ENCOUNTER — HOSPITAL ENCOUNTER (EMERGENCY)
Facility: HOSPITAL | Age: 47
Discharge: HOME | End: 2022-06-11
Attending: EMERGENCY MEDICINE
Payer: MEDICAID

## 2022-06-11 VITALS
WEIGHT: 175 LBS | SYSTOLIC BLOOD PRESSURE: 125 MMHG | RESPIRATION RATE: 18 BRPM | HEART RATE: 63 BPM | TEMPERATURE: 98 F | DIASTOLIC BLOOD PRESSURE: 80 MMHG | HEIGHT: 68 IN | OXYGEN SATURATION: 100 % | BODY MASS INDEX: 26.52 KG/M2

## 2022-06-11 DIAGNOSIS — R07.9 CHEST PAIN, UNSPECIFIED TYPE: Primary | ICD-10-CM

## 2022-06-11 DIAGNOSIS — R07.89 CHEST DISCOMFORT: ICD-10-CM

## 2022-06-11 LAB
ALBUMIN SERPL BCP-MCNC: 3.7 G/DL (ref 3.5–5.2)
ALP SERPL-CCNC: 72 U/L (ref 55–135)
ALT SERPL W/O P-5'-P-CCNC: 15 U/L (ref 10–44)
ANION GAP SERPL CALC-SCNC: 11 MMOL/L (ref 8–16)
AST SERPL-CCNC: 17 U/L (ref 10–40)
BASOPHILS # BLD AUTO: 0.05 K/UL (ref 0–0.2)
BASOPHILS NFR BLD: 0.6 % (ref 0–1.9)
BILIRUB SERPL-MCNC: 0.7 MG/DL (ref 0.1–1)
BNP SERPL-MCNC: <10 PG/ML (ref 0–99)
BUN SERPL-MCNC: 15 MG/DL (ref 6–20)
CALCIUM SERPL-MCNC: 9 MG/DL (ref 8.7–10.5)
CHLORIDE SERPL-SCNC: 107 MMOL/L (ref 95–110)
CO2 SERPL-SCNC: 19 MMOL/L (ref 23–29)
CREAT SERPL-MCNC: 0.8 MG/DL (ref 0.5–1.4)
D DIMER PPP IA.FEU-MCNC: 0.24 MG/L FEU
DIFFERENTIAL METHOD: ABNORMAL
EOSINOPHIL # BLD AUTO: 0.4 K/UL (ref 0–0.5)
EOSINOPHIL NFR BLD: 4.2 % (ref 0–8)
ERYTHROCYTE [DISTWIDTH] IN BLOOD BY AUTOMATED COUNT: 14 % (ref 11.5–14.5)
EST. GFR  (AFRICAN AMERICAN): >60 ML/MIN/1.73 M^2
EST. GFR  (NON AFRICAN AMERICAN): >60 ML/MIN/1.73 M^2
GLUCOSE SERPL-MCNC: 105 MG/DL (ref 70–110)
HCT VFR BLD AUTO: 46.9 % (ref 40–54)
HGB BLD-MCNC: 16.6 G/DL (ref 14–18)
IMM GRANULOCYTES # BLD AUTO: 0.02 K/UL (ref 0–0.04)
IMM GRANULOCYTES NFR BLD AUTO: 0.2 % (ref 0–0.5)
LYMPHOCYTES # BLD AUTO: 1.2 K/UL (ref 1–4.8)
LYMPHOCYTES NFR BLD: 14.8 % (ref 18–48)
MCH RBC QN AUTO: 31.7 PG (ref 27–31)
MCHC RBC AUTO-ENTMCNC: 35.4 G/DL (ref 32–36)
MCV RBC AUTO: 90 FL (ref 82–98)
MONOCYTES # BLD AUTO: 1.1 K/UL (ref 0.3–1)
MONOCYTES NFR BLD: 13.1 % (ref 4–15)
NEUTROPHILS # BLD AUTO: 5.6 K/UL (ref 1.8–7.7)
NEUTROPHILS NFR BLD: 67.1 % (ref 38–73)
NRBC BLD-RTO: 0 /100 WBC
PLATELET # BLD AUTO: 245 K/UL (ref 150–450)
PMV BLD AUTO: 10.8 FL (ref 9.2–12.9)
POTASSIUM SERPL-SCNC: 4.2 MMOL/L (ref 3.5–5.1)
PROT SERPL-MCNC: 6.7 G/DL (ref 6–8.4)
RBC # BLD AUTO: 5.23 M/UL (ref 4.6–6.2)
SODIUM SERPL-SCNC: 137 MMOL/L (ref 136–145)
TROPONIN I SERPL DL<=0.01 NG/ML-MCNC: <0.006 NG/ML (ref 0–0.03)
TROPONIN I SERPL DL<=0.01 NG/ML-MCNC: <0.006 NG/ML (ref 0–0.03)
WBC # BLD AUTO: 8.39 K/UL (ref 3.9–12.7)

## 2022-06-11 PROCEDURE — 84484 ASSAY OF TROPONIN QUANT: CPT | Mod: 91 | Performed by: EMERGENCY MEDICINE

## 2022-06-11 PROCEDURE — 99285 PR EMERGENCY DEPT VISIT,LEVEL V: ICD-10-PCS | Mod: ,,, | Performed by: EMERGENCY MEDICINE

## 2022-06-11 PROCEDURE — 63600175 PHARM REV CODE 636 W HCPCS: Performed by: EMERGENCY MEDICINE

## 2022-06-11 PROCEDURE — 99285 EMERGENCY DEPT VISIT HI MDM: CPT | Mod: ,,, | Performed by: EMERGENCY MEDICINE

## 2022-06-11 PROCEDURE — 86803 HEPATITIS C AB TEST: CPT | Performed by: EMERGENCY MEDICINE

## 2022-06-11 PROCEDURE — 85025 COMPLETE CBC W/AUTO DIFF WBC: CPT | Performed by: EMERGENCY MEDICINE

## 2022-06-11 PROCEDURE — 80053 COMPREHEN METABOLIC PANEL: CPT | Performed by: EMERGENCY MEDICINE

## 2022-06-11 PROCEDURE — 99285 EMERGENCY DEPT VISIT HI MDM: CPT | Mod: 25

## 2022-06-11 PROCEDURE — 83880 ASSAY OF NATRIURETIC PEPTIDE: CPT | Performed by: EMERGENCY MEDICINE

## 2022-06-11 PROCEDURE — 87389 HIV-1 AG W/HIV-1&-2 AB AG IA: CPT | Performed by: EMERGENCY MEDICINE

## 2022-06-11 PROCEDURE — 93010 EKG 12-LEAD: ICD-10-PCS | Mod: ,,, | Performed by: INTERNAL MEDICINE

## 2022-06-11 PROCEDURE — 93010 ELECTROCARDIOGRAM REPORT: CPT | Mod: ,,, | Performed by: INTERNAL MEDICINE

## 2022-06-11 PROCEDURE — 85379 FIBRIN DEGRADATION QUANT: CPT | Performed by: EMERGENCY MEDICINE

## 2022-06-11 PROCEDURE — 96375 TX/PRO/DX INJ NEW DRUG ADDON: CPT

## 2022-06-11 PROCEDURE — 96374 THER/PROPH/DIAG INJ IV PUSH: CPT

## 2022-06-11 PROCEDURE — 93005 ELECTROCARDIOGRAM TRACING: CPT

## 2022-06-11 RX ORDER — KETOROLAC TROMETHAMINE 30 MG/ML
10 INJECTION, SOLUTION INTRAMUSCULAR; INTRAVENOUS
Status: COMPLETED | OUTPATIENT
Start: 2022-06-11 | End: 2022-06-11

## 2022-06-11 RX ORDER — MORPHINE SULFATE 4 MG/ML
4 INJECTION, SOLUTION INTRAMUSCULAR; INTRAVENOUS
Status: COMPLETED | OUTPATIENT
Start: 2022-06-11 | End: 2022-06-11

## 2022-06-11 RX ORDER — CEPHALEXIN 500 MG/1
500 CAPSULE ORAL 4 TIMES DAILY
Qty: 20 CAPSULE | Refills: 0 | Status: SHIPPED | OUTPATIENT
Start: 2022-06-11 | End: 2022-06-16

## 2022-06-11 RX ADMIN — SODIUM CHLORIDE, SODIUM LACTATE, POTASSIUM CHLORIDE, AND CALCIUM CHLORIDE 1000 ML: .6; .31; .03; .02 INJECTION, SOLUTION INTRAVENOUS at 11:06

## 2022-06-11 RX ADMIN — KETOROLAC TROMETHAMINE 10 MG: 30 INJECTION, SOLUTION INTRAMUSCULAR at 08:06

## 2022-06-11 RX ADMIN — MORPHINE SULFATE 4 MG: 4 INJECTION INTRAVENOUS at 10:06

## 2022-06-11 NOTE — Clinical Note
"Donell"Alokummsara" Xavier was seen and treated in our emergency department on 6/11/2022.  He may return to work on 06/13/2022.       If you have any questions or concerns, please don't hesitate to call.      Tosin Cruz MD"

## 2022-06-11 NOTE — ED PROVIDER NOTES
Encounter Date: 6/11/2022       History     Chief Complaint   Patient presents with    Chest Pain     Denies cardiac hx     This is a 46-year-old male who presents to the emergency department with chest pain.  The pain air left-sided.  He describes it as a pressure type sensation when it is present but also has a pleuritic quality.  ER has no pain to his back.  No ripping or tearing sensation to the pain.  The pain can last for hours.  It is also worsened with palpation of the chest.  He has no personal history of hypertension, hypercholesterolemia, or diabetes.  He does smoke cigarettes.  His dad has a history of coronary disease that was diagnosed in his 60s.  There is no family history of thromboembolic disease.  He has had no associated fevers or cough.  No hemoptysis.  No leg swelling.  He also does not notice that the discomfort not with early worsens with exertion.  A somewhat occurs randomly.  He does state that he have a significant amount of anxiety recently.  No sensation or shortness of breath with this.  No hyperventilation.  No GI symptoms.  Also of note during his review of systems he commented on an area over the olecranon of the right elbow where he had a previous abrasion that has now reddened.  He also wanted this evaluated.  Of note regarding his chest discomfort, there is a radiation to the arms or to the jaw.  As above there is no radiation to the back.  He has not had any diaphoresis.        Review of patient's allergies indicates:  No Known Allergies  Past Medical History:   Diagnosis Date    Acute gastritis without hemorrhage 06/03/2019    aspirin induced    Addiction to drug     [facility]CA    ADHD (attention deficit hyperactivity disorder)     [facility]    Anxiety     Attempted suicide 06/20/19    pt stated attempted 2 weeks ago- aspirin    Depression     Hallucination     Heavy smoker (more than 20 cigarettes per day)     History of psychiatric  hospitalization     History of rectal bleeding 2012    OSH Colonoscopy without polyps.    Hx of psychiatric care     Major depression 6/24/2019    Major depressive disorder, recurrent, severe with psychotic features 6/25/2019    Migraine headache     Psychiatric problem     PTSD (post-traumatic stress disorder) 6/25/2019    Severe episode of recurrent major depressive disorder, with psychotic features 6/25/2019    Shingles 2013    Substance abuse     Suicide attempt     attempted twice    Suicide attempt by drug ingestion 9/16/2019    Suicide attempt by substance overdose 9/16/2019    Therapy      Past Surgical History:   Procedure Laterality Date    CYST REMOVAL N/A 2013    near heart    GANGLION CYST EXCISION Right     wrist     Family History   Problem Relation Age of Onset    Stroke Mother     Diabetes Father     Hypertension Father     Lung cancer Father     Lung cancer Maternal Grandmother     Lung cancer Maternal Grandfather     Lung cancer Paternal Grandmother     Lung cancer Paternal Grandfather     Lung cancer Maternal Uncle     Lung cancer Maternal Uncle     Depression Maternal Aunt      Social History     Tobacco Use    Smoking status: Current Every Day Smoker     Packs/day: 1.00     Types: Cigarettes     Start date: 1997    Smokeless tobacco: Current User   Substance Use Topics    Alcohol use: No    Drug use: Yes     Frequency: 3.0 times per week     Types: Marijuana     Comment: last used one month ago     Review of Systems   Constitutional: Negative for chills and fever.   HENT: Negative for congestion, rhinorrhea and sore throat.    Eyes: Negative for visual disturbance.   Respiratory: Negative for cough and shortness of breath.    Cardiovascular: Positive for chest pain.   Gastrointestinal: Negative for abdominal pain, diarrhea, nausea and vomiting.   Endocrine: Negative for polyuria.   Genitourinary: Negative for dysuria.   Musculoskeletal: Negative for back pain.    Skin: Negative for rash.   Allergic/Immunologic: Negative for immunocompromised state.   Neurological: Negative for weakness and numbness.   Hematological: Does not bruise/bleed easily.   Psychiatric/Behavioral: Negative for confusion.       Physical Exam     Initial Vitals [06/11/22 0659]   BP Pulse Resp Temp SpO2   137/84 75 18 98.1 °F (36.7 °C) 97 %      MAP       --         Physical Exam    Nursing note and vitals reviewed.  Constitutional: He appears well-developed and well-nourished. No distress.   HENT:   Head: Normocephalic and atraumatic.   Mouth/Throat: Oropharynx is clear and moist. No oropharyngeal exudate.   Eyes: Conjunctivae and EOM are normal. Pupils are equal, round, and reactive to light.   Neck: Neck supple.   Normal range of motion.  Cardiovascular: Normal rate, regular rhythm, normal heart sounds and intact distal pulses. Exam reveals no gallop and no friction rub.    No murmur heard.  Pulmonary/Chest: Breath sounds normal. No respiratory distress. He has no wheezes. He has no rhonchi. He has no rales.   Abdominal: Abdomen is soft. Bowel sounds are normal. He exhibits no distension. There is no abdominal tenderness. There is no rebound and no guarding.   Musculoskeletal:         General: No edema. Normal range of motion.      Cervical back: Normal range of motion and neck supple.      Comments: He does have tenderness over the left chest wall which is is area of complaint.     Neurological: He is alert and oriented to person, place, and time. He has normal strength. No cranial nerve deficit or sensory deficit. GCS score is 15. GCS eye subscore is 4. GCS verbal subscore is 5. GCS motor subscore is 6.   Skin: Skin is warm and dry. Capillary refill takes less than 2 seconds.   In particular, the patient has no skin eruption overlying the chest wall or back.  He does have some scattered abrasions over the MCP joints bilaterally on the dorsum of the hands. He also does have an abrasion over the  right olecranon but does have about a dime-size area of surrounding redness.  There is no actual exudate from the wound.  There is no fluctuant area.  This does not encompass the entirety the joint. It is a very focal area.   Psychiatric: He has a normal mood and affect.         ED Course   Procedures  Labs Reviewed   HIV 1 / 2 ANTIBODY   HEPATITIS C ANTIBODY   CBC W/ AUTO DIFFERENTIAL   COMPREHENSIVE METABOLIC PANEL   TROPONIN I   B-TYPE NATRIURETIC PEPTIDE   D DIMER, QUANTITATIVE          Imaging Results    None          Medications   ketorolac injection 9.999 mg (has no administration in time range)     Medical Decision Making:   History:   I obtained history from: someone other than patient.       <> Summary of History: The patient also is accompanied by his significant other who provides history as well.  Old Medical Records: I decided to obtain old medical records.  Old Records Summarized: records from clinic visits.       <> Summary of Records: He was last seen here in the ED in December of 2021 for dizziness.  Initial Assessment:   This is a 46-year-old male who presents to the emergency department with chest pain.  Differential Diagnosis:   The patient's chest pain is quite atypical for cardiac ischemic cause.  However, we did want to assure that there was no evidence of cardiac ischemia today.  I felt that an ECG with 2 serial troponins was sufficient to exclude acute ischemia.  The patient does smoke and has father who had coronary disease in his 60s.  He is not hypertensive, does not have hyperlipidemia, and does not have diabetes.  The pain also had a pleuritic quality and we wanted to assure that he had a negative D-dimer to affectively rule out pulmonary embolus.  I felt that infection was of low likelihood given his lack of other infectious symptoms.  Thus pneumonia was quite low on the differential.  He also has no risks for etiologies such as cardiac tamponade.  He also has no radiation of the pain  to his back and no ripping or tearing sensation and I did not feel that his presentation was concerning for aortic dissection.  It also is not pain was maximal in intensity at onset.  I felt that most likely the patient had musculoskeletal chest wall pain but obviously this was all diagnosis of exclusion.  Independently Interpreted Test(s):   I have ordered and independently interpreted X-rays - see prior notes.  I have ordered and independently interpreted EKG Reading(s) - see prior notes  Clinical Tests:   Lab Tests: Ordered and Reviewed  Radiological Study: Ordered and Reviewed  Medical Tests: Ordered  ED Management:  The patient did well during his workup here in the emergency department today.  His ECG, independently reviewed and interpreted by me, revealed sinus rhythm with no acute ST or T-wave changes concerning for ischemia or infarction.  He had reassuring electrolytes.  His white count was normal.  His hematocrit was reassuring as well.  He had 2 normal serial troponins.  He also had a negative D-dimer.  His chest x-ray, independently reviewed and interpreted by me and interpreted by Radiology, did not reveal any evidence of acute fracture, infiltrate, effusion or pneumothorax.  I do feel that given the above workup we have excluded life-threatening causes to his chest pain.  I am discharging him on a course of scheduled ibuprofen with plans to follow-up closely with his primary physician this upcoming week for reexamination.  He has been instructed to return to the emergency department for any worsening chest discomfort, shortness of breath, or any other concerns.  I have also called in a prescription of Keflex to his [facility] to treat this small area of cellulitis to his elbow.  This is in a focal area over the olecranon and does not involve the entirety of the joint.  There is no concern for septic arthritis at this time.  The patient is discharged in stable condition.  E.d. diagnosis:  1. Acute  chest pain.  2. Acute cellulitis complicating abrasion over left olecranon.                      Clinical Impression:   Final diagnoses:  [R07.89] Chest discomfort                 Tosin Cruz MD  06/11/22 7743

## 2022-06-11 NOTE — DISCHARGE INSTRUCTIONS
We have performed a thorough workup today to exclude life threatening causes of your chest pain. Please follow up with your primary doctor in the next 48 hours for recheck to assure that you are doing better and continue this workup. Return to the ED for any worsening pain, shortness of breath, or any other concerns.   You can take ibuprofen for your discomfort - 600 mg of ibuprofen every six hours for the next five days.   Our goal in the emergency department is to always give you outstanding care and exceptional service. You may receive a survey by mail or e-mail in the next week regarding your experience in our ED. We would greatly appreciate your completing and returning the survey. Your feedback provides us with a way to recognize our staff who give very good care and it helps us learn how to improve when your experience was below our aspiration of excellence.

## 2022-06-11 NOTE — ED NOTES
LOC: Pt awake, alert, aware of environment, appropriate affect, oriented x3, speaking appropriately  APPEARANCE: Pt resting comfortably, no acute distress, clean, well groomed. Pt's clothing is properly fastened.  SKIN: The skin is warm and dry, intact, patient has normal skin turgor and moist mucus membranes  RESPIRATORY: Airway is open and patent, respirations spontaneous, even and unlabored, normal effort and rate  CARDIAC: Normal rate and rhythm, no peripheral edema noted, capillary refill < 3 seconds.   NEUROLOGIC: PERRL, facial expression is symmetrical, pt moving all extremities spontaneously, normal sensation in all extremities when touched with finger. Follows all commands appropriately.  MUSCULOSKELETAL: No obvious deformities.

## 2022-06-12 ENCOUNTER — HOSPITAL ENCOUNTER (EMERGENCY)
Facility: HOSPITAL | Age: 47
Discharge: HOME | End: 2022-06-12
Attending: EMERGENCY MEDICINE
Payer: MEDICAID

## 2022-06-12 VITALS
WEIGHT: 175 LBS | RESPIRATION RATE: 14 BRPM | DIASTOLIC BLOOD PRESSURE: 67 MMHG | HEIGHT: 68 IN | OXYGEN SATURATION: 97 % | TEMPERATURE: 99 F | HEART RATE: 82 BPM | BODY MASS INDEX: 26.52 KG/M2 | SYSTOLIC BLOOD PRESSURE: 134 MMHG

## 2022-06-12 DIAGNOSIS — Z86.19 HISTORY OF SHINGLES: ICD-10-CM

## 2022-06-12 DIAGNOSIS — R07.89 ATYPICAL CHEST PAIN: Primary | ICD-10-CM

## 2022-06-12 LAB
ALBUMIN SERPL BCP-MCNC: 3.6 G/DL (ref 3.5–5.2)
ALP SERPL-CCNC: 71 U/L (ref 55–135)
ALT SERPL W/O P-5'-P-CCNC: 15 U/L (ref 10–44)
ANION GAP SERPL CALC-SCNC: 11 MMOL/L (ref 8–16)
ANISOCYTOSIS BLD QL SMEAR: SLIGHT
AST SERPL-CCNC: 20 U/L (ref 10–40)
BASOPHILS # BLD AUTO: 0.08 K/UL (ref 0–0.2)
BASOPHILS NFR BLD: 0.8 % (ref 0–1.9)
BILIRUB SERPL-MCNC: 0.5 MG/DL (ref 0.1–1)
BUN SERPL-MCNC: 20 MG/DL (ref 6–20)
BUN SERPL-MCNC: 24 MG/DL (ref 6–30)
CALCIUM SERPL-MCNC: 9 MG/DL (ref 8.7–10.5)
CHLORIDE SERPL-SCNC: 107 MMOL/L (ref 95–110)
CHLORIDE SERPL-SCNC: 107 MMOL/L (ref 95–110)
CO2 SERPL-SCNC: 18 MMOL/L (ref 23–29)
CREAT SERPL-MCNC: 0.9 MG/DL (ref 0.5–1.4)
CREAT SERPL-MCNC: 1 MG/DL (ref 0.5–1.4)
DIFFERENTIAL METHOD: ABNORMAL
EOSINOPHIL # BLD AUTO: 0.6 K/UL (ref 0–0.5)
EOSINOPHIL NFR BLD: 6.5 % (ref 0–8)
ERYTHROCYTE [DISTWIDTH] IN BLOOD BY AUTOMATED COUNT: 13.9 % (ref 11.5–14.5)
EST. GFR  (AFRICAN AMERICAN): >60 ML/MIN/1.73 M^2
EST. GFR  (NON AFRICAN AMERICAN): >60 ML/MIN/1.73 M^2
GLUCOSE SERPL-MCNC: 93 MG/DL (ref 70–110)
GLUCOSE SERPL-MCNC: 96 MG/DL (ref 70–110)
HCT VFR BLD AUTO: 46.3 % (ref 40–54)
HCT VFR BLD CALC: 46 %PCV (ref 36–54)
HGB BLD-MCNC: 16.3 G/DL (ref 14–18)
IMM GRANULOCYTES # BLD AUTO: 0.03 K/UL (ref 0–0.04)
IMM GRANULOCYTES NFR BLD AUTO: 0.3 % (ref 0–0.5)
LYMPHOCYTES # BLD AUTO: 2 K/UL (ref 1–4.8)
LYMPHOCYTES NFR BLD: 20.9 % (ref 18–48)
MCH RBC QN AUTO: 32 PG (ref 27–31)
MCHC RBC AUTO-ENTMCNC: 35.2 G/DL (ref 32–36)
MCV RBC AUTO: 91 FL (ref 82–98)
MONOCYTES # BLD AUTO: 1.1 K/UL (ref 0.3–1)
MONOCYTES NFR BLD: 11.7 % (ref 4–15)
NEUTROPHILS # BLD AUTO: 5.7 K/UL (ref 1.8–7.7)
NEUTROPHILS NFR BLD: 59.8 % (ref 38–73)
NRBC BLD-RTO: 0 /100 WBC
PLATELET # BLD AUTO: 279 K/UL (ref 150–450)
PLATELET BLD QL SMEAR: ABNORMAL
PMV BLD AUTO: 10.2 FL (ref 9.2–12.9)
POC IONIZED CALCIUM: 1.23 MMOL/L (ref 1.06–1.42)
POC TCO2 (MEASURED): 24 MMOL/L (ref 23–29)
POTASSIUM BLD-SCNC: 4 MMOL/L (ref 3.5–5.1)
POTASSIUM SERPL-SCNC: 4.1 MMOL/L (ref 3.5–5.1)
PROT SERPL-MCNC: 7.1 G/DL (ref 6–8.4)
RBC # BLD AUTO: 5.1 M/UL (ref 4.6–6.2)
SAMPLE: NORMAL
SMUDGE CELLS BLD QL SMEAR: PRESENT
SODIUM BLD-SCNC: 140 MMOL/L (ref 136–145)
SODIUM SERPL-SCNC: 136 MMOL/L (ref 136–145)
TROPONIN I SERPL DL<=0.01 NG/ML-MCNC: <0.006 NG/ML (ref 0–0.03)
WBC # BLD AUTO: 9.56 K/UL (ref 3.9–12.7)

## 2022-06-12 PROCEDURE — 25000003 PHARM REV CODE 250: Performed by: PHYSICIAN ASSISTANT

## 2022-06-12 PROCEDURE — 63600175 PHARM REV CODE 636 W HCPCS: Performed by: PHYSICIAN ASSISTANT

## 2022-06-12 PROCEDURE — 84484 ASSAY OF TROPONIN QUANT: CPT | Performed by: PHYSICIAN ASSISTANT

## 2022-06-12 PROCEDURE — 93010 EKG 12-LEAD: ICD-10-PCS | Mod: ,,, | Performed by: INTERNAL MEDICINE

## 2022-06-12 PROCEDURE — 99284 EMERGENCY DEPT VISIT MOD MDM: CPT | Mod: 25

## 2022-06-12 PROCEDURE — 85025 COMPLETE CBC W/AUTO DIFF WBC: CPT | Performed by: PHYSICIAN ASSISTANT

## 2022-06-12 PROCEDURE — 80053 COMPREHEN METABOLIC PANEL: CPT | Performed by: PHYSICIAN ASSISTANT

## 2022-06-12 PROCEDURE — 99285 EMERGENCY DEPT VISIT HI MDM: CPT | Mod: ,,, | Performed by: EMERGENCY MEDICINE

## 2022-06-12 PROCEDURE — 93005 ELECTROCARDIOGRAM TRACING: CPT

## 2022-06-12 PROCEDURE — 86803 HEPATITIS C AB TEST: CPT | Performed by: EMERGENCY MEDICINE

## 2022-06-12 PROCEDURE — 96374 THER/PROPH/DIAG INJ IV PUSH: CPT

## 2022-06-12 PROCEDURE — 93010 ELECTROCARDIOGRAM REPORT: CPT | Mod: ,,, | Performed by: INTERNAL MEDICINE

## 2022-06-12 PROCEDURE — 87389 HIV-1 AG W/HIV-1&-2 AB AG IA: CPT | Performed by: EMERGENCY MEDICINE

## 2022-06-12 PROCEDURE — 96375 TX/PRO/DX INJ NEW DRUG ADDON: CPT

## 2022-06-12 PROCEDURE — 80047 BASIC METABLC PNL IONIZED CA: CPT

## 2022-06-12 PROCEDURE — 82330 ASSAY OF CALCIUM: CPT

## 2022-06-12 PROCEDURE — 99285 PR EMERGENCY DEPT VISIT,LEVEL V: ICD-10-PCS | Mod: ,,, | Performed by: EMERGENCY MEDICINE

## 2022-06-12 RX ORDER — MORPHINE SULFATE 4 MG/ML
4 INJECTION, SOLUTION INTRAMUSCULAR; INTRAVENOUS
Status: COMPLETED | OUTPATIENT
Start: 2022-06-12 | End: 2022-06-12

## 2022-06-12 RX ORDER — IBUPROFEN 400 MG/1
800 TABLET ORAL
Status: COMPLETED | OUTPATIENT
Start: 2022-06-12 | End: 2022-06-12

## 2022-06-12 RX ORDER — MORPHINE SULFATE 15 MG/1
15 TABLET ORAL EVERY 4 HOURS PRN
Qty: 7 TABLET | Refills: 0 | Status: SHIPPED | OUTPATIENT
Start: 2022-06-12 | End: 2022-06-15

## 2022-06-12 RX ORDER — VALACYCLOVIR HYDROCHLORIDE 1 G/1
1000 TABLET, FILM COATED ORAL 3 TIMES DAILY
Qty: 21 TABLET | Refills: 0 | Status: SHIPPED | OUTPATIENT
Start: 2022-06-12 | End: 2022-10-14

## 2022-06-12 RX ORDER — ONDANSETRON 2 MG/ML
4 INJECTION INTRAMUSCULAR; INTRAVENOUS
Status: COMPLETED | OUTPATIENT
Start: 2022-06-12 | End: 2022-06-12

## 2022-06-12 RX ADMIN — MORPHINE SULFATE 4 MG: 4 INJECTION INTRAVENOUS at 05:06

## 2022-06-12 RX ADMIN — ONDANSETRON 4 MG: 2 INJECTION INTRAMUSCULAR; INTRAVENOUS at 05:06

## 2022-06-12 RX ADMIN — IBUPROFEN 800 MG: 400 TABLET, FILM COATED ORAL at 07:06

## 2022-06-12 NOTE — DISCHARGE INSTRUCTIONS
No emergent findings on your work-up today. You maybe experiencing symptoms related to shingles prior to the onset of rash appearing given your history. Start taking the prescribed Valacyclovir as directed. Follow-up with your primary care provider within the next week for further evaluation.     Return to the emergency room for new, worsening, or concerning symptoms.

## 2022-06-12 NOTE — ED NOTES
Patient identifiers verified and correct for Donell Park  LOC: The patient is awake, alert and aware of environment with an appropriate affect, the patient is oriented x 3 and speaking appropriately.   APPEARANCE: Patient appears comfortable and in no acute distress, patient is clean and well groomed.  SKIN: The skin is warm and dry, color consistent with ethnicity, patient has normal skin turgor and moist mucus membranes, skin intact, no breakdown or bruising noted.   MUSCULOSKELETAL: Patient moving all extremities spontaneously, no swelling noted.  RESPIRATORY: Airway is open and patent, respirations are spontaneous, patient has a normal effort and rate, no accessory muscle use noted, pt placed on continuous pulse ox with O2 sats noted at 97% on room air.  CARDIAC: Pt placed on cardiac monitor. Patient has a normal rate and regular rhythm, no edema noted, capillary refill < 3 seconds. Pt reports chest pain.  GASTRO: Soft and non tender to palpation, no distention noted, normoactive bowel sounds present in all four quadrants. Pt states bowel movements have been regular.  : Pt denies any pain or frequency with urination.  NEURO: Pt opens eyes spontaneously, behavior appropriate to situation, follows commands, facial expression symmetrical, bilateral hand grasp equal and even, purposeful motor response noted, normal sensation in all extremities when touched with a finger.

## 2022-06-12 NOTE — ED PROVIDER NOTES
Encounter Date: 6/12/2022       History     Chief Complaint   Patient presents with    Chest Pain     Seen yest     The history is provided by the patient and medical records. No  was used.      Donell Park is a 46 y.o. male with medical history of tobacco use, hx of shingles presenting to the ED with the chief complaint of chest pain.     Patient awoke with L axillary pain that has been constant for the past 3 days. Reports worsening with body position changes, deep breathing, LUE movements. Reports feeling miserable as he cannot sleep at night due to the pain. Reports pain today has began to travel towards the right side of his chest. Reports negative stress test about 17 years ago. Denies personal history of cardiac disease. Reports cardiac disease in his father that began in his 60's. Reports feeling stressed out as he has had 4 close people pass away within the past year. Additionally reports history of shingles and had a rash to his left upper back. Reports the sensation of his pain today feels similar to when he had shingles.     Seen in the ED yesterday. Trop negative x2. D-dimer negative. Noted to have a small ulceration to R olecranon and given RX for Keflex for infectious coverage.     Review of patient's allergies indicates:  No Known Allergies  Past Medical History:   Diagnosis Date    Acute gastritis without hemorrhage 06/03/2019    aspirin induced    Addiction to drug     [facility]    ADHD (attention deficit hyperactivity disorder)     a [facility] at [facility]    Anxiety     Attempted suicide 06/20/19    pt stated attempted 2 weeks ago- aspirin    Depression     Hallucination     Heavy smoker (more than 20 cigarettes per day)     History of psychiatric hospitalization     History of rectal bleeding 2012    OSH Colonoscopy without polyps.    Hx of psychiatric care     Major depression 6/24/2019    Major depressive disorder, recurrent,  severe with psychotic features 6/25/2019    Migraine headache     Psychiatric problem     PTSD (post-traumatic stress disorder) 6/25/2019    Severe episode of recurrent major depressive disorder, with psychotic features 6/25/2019    Shingles 2013    Substance abuse     Suicide attempt     attempted twice    Suicide attempt by drug ingestion 9/16/2019    Suicide attempt by substance overdose 9/16/2019    Therapy      Past Surgical History:   Procedure Laterality Date    CYST REMOVAL N/A 2013    near heart    GANGLION CYST EXCISION Right     wrist     Family History   Problem Relation Age of Onset    Stroke Mother     Diabetes Father     Hypertension Father     Lung cancer Father     Lung cancer Maternal Grandmother     Lung cancer Maternal Grandfather     Lung cancer Paternal Grandmother     Lung cancer Paternal Grandfather     Lung cancer Maternal Uncle     Lung cancer Maternal Uncle     Depression Maternal Aunt      Social History     Tobacco Use    Smoking status: Current Every Day Smoker     Packs/day: 1.00     Types: Cigarettes     Start date: 1997    Smokeless tobacco: Current User   Substance Use Topics    Alcohol use: No    Drug use: Yes     Frequency: 3.0 times per week     Types: Marijuana     Comment: last used one month ago     Review of Systems   Constitutional: Negative for fever.   HENT: Negative for sore throat.    Eyes: Negative for pain.   Respiratory: Negative for shortness of breath.    Cardiovascular: Positive for chest pain.   Gastrointestinal: Negative for nausea.   Genitourinary: Negative for dysuria.   Musculoskeletal: Negative for back pain.   Skin: Positive for wound. Negative for rash.   Neurological: Negative for weakness.   Hematological: Does not bruise/bleed easily.       Physical Exam     Initial Vitals   BP Pulse Resp Temp SpO2   06/12/22 1447 06/12/22 1447 06/12/22 1447 06/12/22 1447 06/12/22 1937   127/75 93 18 98.5 °F (36.9 °C) 97 %      MAP       --                 Physical Exam    Constitutional: He appears well-developed and well-nourished. He is not diaphoretic. He is easily aroused.   HENT:   Head: Normocephalic and atraumatic.   Mouth/Throat: Oropharynx is clear and moist. No oropharyngeal exudate.   Eyes: EOM and lids are normal. Pupils are equal, round, and reactive to light. No scleral icterus.   Neck: Phonation normal. Neck supple. No stridor present.   Normal range of motion.  Cardiovascular: Normal rate and regular rhythm.   Pulmonary/Chest: Breath sounds normal. No stridor. No respiratory distress. He has no wheezes. He has no rales. He exhibits tenderness.     Reproducible pain with palpation to L chest wall following T3/4 dermatome to mid axillary line. 1 small pustule noted in the area. No visible rash.    Abdominal: Abdomen is soft. He exhibits no distension. There is no abdominal tenderness. There is no rebound.   Musculoskeletal:         General: No tenderness or edema. Normal range of motion.      Cervical back: Normal range of motion and neck supple.      Comments: Superficial ulceration to R olecranon. No induration or fluctuance. Full A/P ROM      Neurological: He is alert, oriented to person, place, and time and easily aroused. He has normal strength. No sensory deficit.   Skin: Skin is warm and dry. No rash noted. No erythema.   Psychiatric: He has a normal mood and affect. His speech is normal.       ED Course   Procedures  Labs Reviewed   CBC W/ AUTO DIFFERENTIAL - Abnormal; Notable for the following components:       Result Value    MCH 32.0 (*)     Mono # 1.1 (*)     Eos # 0.6 (*)     All other components within normal limits   COMPREHENSIVE METABOLIC PANEL - Abnormal; Notable for the following components:    CO2 18 (*)     All other components within normal limits   TROPONIN I   HIV 1 / 2 ANTIBODY   HEPATITIS C ANTIBODY   ISTAT PROCEDURE   ISTAT CHEM8          Imaging Results    None          Medications   morphine injection 4 mg (4 mg  Intravenous Given 6/12/22 1725)   ondansetron injection 4 mg (4 mg Intravenous Given 6/12/22 1724)   ibuprofen tablet 800 mg (800 mg Oral Given 6/12/22 1907)     Medical Decision Making:   History:   Old Medical Records: I decided to obtain old medical records.  Old Records Summarized: records from clinic visits.  Independently Interpreted Test(s):   I have ordered and independently interpreted EKG Reading(s) - see summary below       <> Summary of EKG Reading(s): NSR 83 bpm. No STEMI  Clinical Tests:   Lab Tests: Ordered and Reviewed  Medical Tests: Ordered and Reviewed       APC / Resident Notes:   46 y.o. male with medical history of tobacco use, hx of shingles presenting to the ED c/o left-sided chest pain for the past 3 days. Seen in the ED yesterday and had cardiac work-up including negative Troponin x2 and D-dimer. Presents for persistent pain and difficulty sleeping at night which prompted his return visit.     DDx includes but not limited to chest wall strain, ACS, pre-shingles rash. No ripping or tearing sensation, radiation to back, abdominal pain and lower suspicion for aortic dissection. D-dimer undetectable yesterday and do not suspect pulmonary embolism. Negative CXR yesterday and do not suspect pneumonia.     Laboratory work reviewed without significant findings. Trop continues to be undetectable. ECG without significant changes or concerning ischemic findings. Pain easily reproducible with palpation on exam and follows T3/4 dermatome. Reports history of shingles to his L upper back in a region near this dermatome as well. Discussed possibility of developing shingles prior to rash onset with the patient and states the pain feels similar to prior episodes. RX for Valtrex provided for shingles coverage. Small RX for MSIR provided for pain and advised patient to take Tylenol and IBU as well. Patient expresses understanding and agreeable to the plan. Return to ED precautions given for new, worsening, or  concerning symptoms. I have discussed the care of this patient with my supervising physician.                  Clinical Impression:   Final diagnoses:  [Z86.19] History of shingles  [R07.89] Atypical chest pain (Primary)          ED Disposition Condition    Discharge Stable        ED Prescriptions     Medication Sig Dispense Start Date End Date Auth. Provider    valACYclovir (VALTREX) 1000 MG tablet Take 1 tablet (1,000 mg total) by mouth 3 (three) times daily. for 7 days 21 tablet 6/12/2022 6/19/2022 Barrington Tam PA-C    morphine (MSIR) 15 MG tablet Take 1 tablet (15 mg total) by mouth every 4 (four) hours as needed for Pain. 7 tablet 6/12/2022 6/15/2022 Barrington Tam PA-C        Follow-up Information     Follow up With Specialties Details Why Contact Info    Yenny Resendiz MD Internal Medicine   [address] [address]  [phone]             Barrington Tam PA-C  06/12/22 7340

## 2022-06-12 NOTE — Clinical Note
"Donell"Alokummsara" Xavier was seen and treated in our emergency department on 6/12/2022.  He may return to work on 06/15/2022.       If you have any questions or concerns, please don't hesitate to call.      Barrington Tma PA-C"

## 2022-06-13 ENCOUNTER — PATIENT MESSAGE (OUTPATIENT)
Dept: INTERNAL MEDICINE | Facility: CLINIC | Age: 47
End: 2022-06-13
Payer: MEDICAID

## 2022-06-13 ENCOUNTER — TELEPHONE (OUTPATIENT)
Dept: INTERNAL MEDICINE | Facility: CLINIC | Age: 47
End: 2022-06-13
Payer: MEDICAID

## 2022-06-13 LAB
HCV AB SERPL QL IA: NEGATIVE
HCV AB SERPL QL IA: NEGATIVE
HIV 1+2 AB+HIV1 P24 AG SERPL QL IA: NEGATIVE
HIV 1+2 AB+HIV1 P24 AG SERPL QL IA: NEGATIVE

## 2022-06-13 NOTE — TELEPHONE ENCOUNTER
If it was given for shingles, the pain will hopefully be better. If not he may need to return to urgent cane/ED for refill.

## 2022-06-13 NOTE — TELEPHONE ENCOUNTER
I don't understand the issue. He was given paper script from ED yesterday and needs to get it filled at pharmacy.

## 2022-06-13 NOTE — TELEPHONE ENCOUNTER
Patient was given a seven day supply for medication. Patient states that his concern was that he will be out of this medication before his upcoming clinic next available appointment on 8/17/2022. Please advise. Routed to Dr. Solorzano for further instructions. Thanks.

## 2022-06-13 NOTE — TELEPHONE ENCOUNTER
----- Message from Lula Arreola sent at 6/13/2022  8:51 AM CDT -----  Caller is requesting a sooner appointment. Caller declined first available appointment listed below. Caller will not accept being placed on the waitlist and is requesting a message be sent to doctor.        Name of Caller:Donell        When is the first available appointment?n/a        Symptoms:Shingles         Best Call Back Number:[phone]        Additional Information:

## 2022-06-13 NOTE — TELEPHONE ENCOUNTER
Patient was scheduled for clinic next available appointment on 8/17/2022 and added to wait list for sooner appointment. PCP was not available. Patient is requesting an Rx for medication Morphine sulfate 15 mg that was prescribed in the ER to be sent to pharmacy. Routed to Dr. Resendiz for approval. LOV 3/21/2022 Upcoming appointment is 8/17/2022.     preferred pharmacy:   [id number] [facility]

## 2022-06-15 ENCOUNTER — TELEPHONE (OUTPATIENT)
Dept: OPHTHALMOLOGY | Facility: CLINIC | Age: 47
End: 2022-06-15
Payer: MEDICAID

## 2022-06-15 ENCOUNTER — OFFICE VISIT (OUTPATIENT)
Dept: INTERNAL MEDICINE | Facility: CLINIC | Age: 47
End: 2022-06-15
Payer: MEDICAID

## 2022-06-15 VITALS
WEIGHT: 185.88 LBS | SYSTOLIC BLOOD PRESSURE: 116 MMHG | DIASTOLIC BLOOD PRESSURE: 82 MMHG | OXYGEN SATURATION: 98 % | HEART RATE: 83 BPM | BODY MASS INDEX: 28.68 KG/M2

## 2022-06-15 DIAGNOSIS — B02.8 HERPES ZOSTER WITH COMPLICATION: ICD-10-CM

## 2022-06-15 PROCEDURE — 3079F PR MOST RECENT DIASTOLIC BLOOD PRESSURE 80-89 MM HG: ICD-10-PCS | Mod: CPTII,,, | Performed by: PHYSICIAN ASSISTANT

## 2022-06-15 PROCEDURE — 3079F DIAST BP 80-89 MM HG: CPT | Mod: CPTII,,, | Performed by: PHYSICIAN ASSISTANT

## 2022-06-15 PROCEDURE — 1160F PR REVIEW ALL MEDS BY PRESCRIBER/CLIN PHARMACIST DOCUMENTED: ICD-10-PCS | Mod: CPTII,,, | Performed by: PHYSICIAN ASSISTANT

## 2022-06-15 PROCEDURE — 1159F MED LIST DOCD IN RCRD: CPT | Mod: CPTII,,, | Performed by: PHYSICIAN ASSISTANT

## 2022-06-15 PROCEDURE — 1159F PR MEDICATION LIST DOCUMENTED IN MEDICAL RECORD: ICD-10-PCS | Mod: CPTII,,, | Performed by: PHYSICIAN ASSISTANT

## 2022-06-15 PROCEDURE — 1160F RVW MEDS BY RX/DR IN RCRD: CPT | Mod: CPTII,,, | Performed by: PHYSICIAN ASSISTANT

## 2022-06-15 PROCEDURE — 3008F BODY MASS INDEX DOCD: CPT | Mod: CPTII,,, | Performed by: PHYSICIAN ASSISTANT

## 2022-06-15 PROCEDURE — 3008F PR BODY MASS INDEX (BMI) DOCUMENTED: ICD-10-PCS | Mod: CPTII,,, | Performed by: PHYSICIAN ASSISTANT

## 2022-06-15 PROCEDURE — 99214 PR OFFICE/OUTPT VISIT, EST, LEVL IV, 30-39 MIN: ICD-10-PCS | Mod: S$PBB,,, | Performed by: PHYSICIAN ASSISTANT

## 2022-06-15 PROCEDURE — 99214 OFFICE O/P EST MOD 30 MIN: CPT | Mod: PBBFAC | Performed by: PHYSICIAN ASSISTANT

## 2022-06-15 PROCEDURE — 99214 OFFICE O/P EST MOD 30 MIN: CPT | Mod: S$PBB,,, | Performed by: PHYSICIAN ASSISTANT

## 2022-06-15 PROCEDURE — 3074F SYST BP LT 130 MM HG: CPT | Mod: CPTII,,, | Performed by: PHYSICIAN ASSISTANT

## 2022-06-15 PROCEDURE — 99999 PR PBB SHADOW E&M-EST. PATIENT-LVL IV: ICD-10-PCS | Mod: PBBFAC,,, | Performed by: PHYSICIAN ASSISTANT

## 2022-06-15 PROCEDURE — 99999 PR PBB SHADOW E&M-EST. PATIENT-LVL IV: CPT | Mod: PBBFAC,,, | Performed by: PHYSICIAN ASSISTANT

## 2022-06-15 PROCEDURE — 3074F PR MOST RECENT SYSTOLIC BLOOD PRESSURE < 130 MM HG: ICD-10-PCS | Mod: CPTII,,, | Performed by: PHYSICIAN ASSISTANT

## 2022-06-15 RX ORDER — GABAPENTIN 100 MG/1
300 CAPSULE ORAL NIGHTLY
Qty: 45 CAPSULE | Refills: 0 | Status: SHIPPED | OUTPATIENT
Start: 2022-06-15 | End: 2022-06-27

## 2022-06-15 NOTE — TELEPHONE ENCOUNTER
----- Message from Dawna Garcia sent at 6/15/2022 10:58 AM CDT -----  Regarding: URGENT  Contact: Carole [facility] @ [phone]  Pt is is having an outbreak of shingles and need to be seen today. Please call the pt the [facility] to schedule the pt     
Appointment booked  
bed rails

## 2022-06-15 NOTE — PROGRESS NOTES
INTERNAL MEDICINE PROGRESS NOTE    CHIEF COMPLAINT     Chief Complaint   Patient presents with    Follow-up       HPI     Donell Park is a 46 y.o. male who presents for a follow-up visit today.    PCP is Yenny Resendiz MD, patient is known to me.     He was seen in the ER for shingles. He is having left sided facial pain and left sided chest wall pain. He was prescribed valacyclovir and morphine -  He reports that the morphine has not really helped his symptoms. He is requesting better pain relief. He reports that yesterday he developed left eye pain - no vision changes. He has blisters now around the left eyeborw. He denies nausea, vomiting, dizziness, fever, chills.     Of note, he was also prescribed keflex for right elbow wound that was incidentally noticed on ER visit. He has been compliant with all medications.     Past Medical History:  Past Medical History:   Diagnosis Date    Acute gastritis without hemorrhage 06/03/2019    aspirin induced    Addiction to drug     [facility]    ADHD (attention deficit hyperactivity disorder)     a few years ago at [facility]    Anxiety     Attempted suicide 06/20/19    pt stated attempted 2 weeks ago- aspirin    Depression     Hallucination     Heavy smoker (more than 20 cigarettes per day)     History of psychiatric hospitalization     History of rectal bleeding 2012    OSH Colonoscopy without polyps.    Hx of psychiatric care     Major depression 6/24/2019    Major depressive disorder, recurrent, severe with psychotic features 6/25/2019    Migraine headache     Psychiatric problem     PTSD (post-traumatic stress disorder) 6/25/2019    Severe episode of recurrent major depressive disorder, with psychotic features 6/25/2019    Shingles 2013    Substance abuse     Suicide attempt     attempted twice    Suicide attempt by drug ingestion 9/16/2019    Suicide attempt by substance overdose 9/16/2019    Therapy        Home  "Medications:  Prior to Admission medications    Medication Sig Start Date End Date Taking? Authorizing Provider   mirtazapine (REMERON) 7.5 MG Tab Take 1 tablet (7.5 mg total) by mouth every evening. 8/18/21 8/18/22 Yes Yenny Resendiz MD   valACYclovir (VALTREX) 1000 MG tablet Take 1 tablet (1,000 mg total) by mouth 3 (three) times daily. for 7 days 6/12/22 6/19/22 Yes Barrington Tam PA-C   benzonatate (TESSALON) 100 MG capsule  8/5/21   Historical Provider   butalbital-acetaminophen-caffeine -40 mg (FIORICET, ESGIC) -40 mg per tablet  12/2/21   Historical Provider   cephALEXin (KEFLEX) 500 MG capsule Take 1 capsule (500 mg total) by mouth 4 (four) times daily. for 5 days  Patient not taking: Reported on 6/15/2022 6/11/22 6/16/22  Tosin Cruz MD   erythromycin (ROMYCIN) ophthalmic ointment Place a 1/2 inch ribbon of ointment into the lower eyelid twice a day for 7 days  Patient not taking: Reported on 6/15/2022 6/25/21   RODRIGUE Avilez   gabapentin (NEURONTIN) 100 MG capsule Take 3 capsules (300 mg total) by mouth every evening. for 15 days 6/15/22 6/30/22  Carole Pradhan PA-C   HYDROcodone-acetaminophen (NORCO)  mg per tablet  4/23/21   Historical Provider   HYDROCODONE-ACETAMINOPHEN ORAL <span ID="[id number]" style="Bold">HYDROCODONE-ACETAMINOPHEN (HYDROCODONE/ACETAMINOPHEN), 10MG-325MG, TABLET, ORAL, MALLINCKRODT PH, 5</span><br/>Start Date: 4/25/21<br/>Status: Ordered 4/25/21   Historical Provider   hydrOXYzine HCL (ATARAX) 25 MG tablet Take 1 tablet (25 mg total) by mouth every 6 (six) hours.  Patient not taking: No sig reported 8/5/21   Carole Pradhan PA-C   ibuprofen (ADVIL,MOTRIN) 600 MG tablet TAKE 1 TABLET(600 MG) BY MOUTH EVERY 6 HOURS AS NEEDED FOR PAIN  Patient not taking: No sig reported 8/9/21   Yenny Resendiz MD   morphine (MSIR) 15 MG tablet Take 1 tablet (15 mg total) by mouth every 4 (four) hours as needed for Pain.  Patient not taking: Reported on " 6/15/2022 6/12/22 6/15/22  Barrington Tam PA-C   ondansetron (ZOFRAN-ODT) 4 MG TbDL Take 1 tablet (4 mg total) by mouth every 6 (six) hours as needed (Nausea).  Patient not taking: No sig reported 4/20/21   Yenny Resendiz MD   pantoprazole (PROTONIX) 40 MG tablet Take 1 tablet (40 mg total) by mouth every morning. Take every morning on an empty stomach for 14 days, then daily as needed for acid reflux  Patient not taking: No sig reported 4/20/21   Yenny Resendiz MD   tiZANidine (ZANAFLEX) 2 MG tablet Take 2 mg by mouth 3 (three) times daily. 10/4/21   Historical Provider   traMADoL (ULTRAM) 50 mg tablet Take 50 mg by mouth every 6 (six) hours.    Historical Provider       Review of Systems:  Review of Systems   Constitutional: Negative for chills and fever.   HENT: Negative for sore throat and trouble swallowing.    Eyes: Negative for visual disturbance.   Respiratory: Negative for cough and shortness of breath.    Cardiovascular: Negative for chest pain.   Gastrointestinal: Negative for abdominal pain, constipation, diarrhea, nausea and vomiting.   Genitourinary: Negative for dysuria and flank pain.   Musculoskeletal: Negative for back pain, neck pain and neck stiffness.   Skin: Positive for rash.   Neurological: Negative for dizziness, syncope, weakness and headaches.   Psychiatric/Behavioral: Negative for confusion.       Health Maintainence:   Immunizations:  Health Maintenance       Date Due Completion Date    COVID-19 Vaccine (1) Never done ---    Pneumococcal Vaccines (Age 0-64) (1 - PCV) Never done ---    TETANUS VACCINE Never done ---    Influenza Vaccine (Season Ended) 09/01/2022 11/9/2018    Lipid Panel 12/02/2026 12/2/2021           PHYSICAL EXAM     /82 (BP Location: Right arm, Patient Position: Sitting)   Pulse 83   Wt 84.3 kg (185 lb 13.6 oz)   SpO2 98%   BMI 28.68 kg/m²     Physical Exam  Vitals and nursing note reviewed.   Constitutional:       Appearance: Normal appearance.       Comments: Healthy appearing male in NAD or apparent pain. He makes good eye contact, speaks in clear full sentences and ambulates with ease.      HENT:      Head: Normocephalic and atraumatic.      Nose: Nose normal.      Mouth/Throat:      Pharynx: Oropharynx is clear.   Eyes:      Conjunctiva/sclera: Conjunctivae normal.      Comments: Left injected sclera    Cardiovascular:      Rate and Rhythm: Normal rate and regular rhythm.      Pulses: Normal pulses.   Pulmonary:      Effort: No respiratory distress.   Abdominal:      Tenderness: There is no abdominal tenderness.   Musculoskeletal:         General: Normal range of motion.      Cervical back: No rigidity.   Skin:     General: Skin is warm and dry.      Capillary Refill: Capillary refill takes less than 2 seconds.      Findings: Rash present.      Comments: Two areas of erythema to the left eyebrown and to left chest wall  No clustering  No bleeding purulence or induration    Neurological:      General: No focal deficit present.      Mental Status: He is alert.      Gait: Gait normal.   Psychiatric:         Mood and Affect: Mood normal.         LABS     Lab Results   Component Value Date    HGBA1C 4.9 11/12/2018     CMP  Sodium   Date Value Ref Range Status   06/12/2022 136 136 - 145 mmol/L Final     Potassium   Date Value Ref Range Status   06/12/2022 4.1 3.5 - 5.1 mmol/L Final     Chloride   Date Value Ref Range Status   06/12/2022 107 95 - 110 mmol/L Final     CO2   Date Value Ref Range Status   06/12/2022 18 (L) 23 - 29 mmol/L Final     Glucose   Date Value Ref Range Status   06/12/2022 93 70 - 110 mg/dL Final     BUN   Date Value Ref Range Status   06/12/2022 20 6 - 20 mg/dL Final     Creatinine   Date Value Ref Range Status   06/12/2022 0.9 0.5 - 1.4 mg/dL Final     Calcium   Date Value Ref Range Status   06/12/2022 9.0 8.7 - 10.5 mg/dL Final     Total Protein   Date Value Ref Range Status   06/12/2022 7.1 6.0 - 8.4 g/dL Final     Albumin   Date Value Ref  Range Status   06/12/2022 3.6 3.5 - 5.2 g/dL Final     Total Bilirubin   Date Value Ref Range Status   06/12/2022 0.5 0.1 - 1.0 mg/dL Final     Comment:     For infants and newborns, interpretation of results should be based  on gestational age, weight and in agreement with clinical  observations.    Premature Infant recommended reference ranges:  Up to 24 hours.............<8.0 mg/dL  Up to 48 hours............<12.0 mg/dL  3-5 days..................<15.0 mg/dL  6-29 days.................<15.0 mg/dL       Alkaline Phosphatase   Date Value Ref Range Status   06/12/2022 71 55 - 135 U/L Final     AST   Date Value Ref Range Status   06/12/2022 20 10 - 40 U/L Final     Comment:     *Result may be interfered by visible hemolysis     ALT   Date Value Ref Range Status   06/12/2022 15 10 - 44 U/L Final     Anion Gap   Date Value Ref Range Status   06/12/2022 11 8 - 16 mmol/L Final     eGFR if    Date Value Ref Range Status   06/12/2022 >60.0 >60 mL/min/1.73 m^2 Final     eGFR if non    Date Value Ref Range Status   06/12/2022 >60.0 >60 mL/min/1.73 m^2 Final     Comment:     Calculation used to obtain the estimated glomerular filtration  rate (eGFR) is the CKD-EPI equation.        Lab Results   Component Value Date    WBC 9.56 06/12/2022    HGB 16.3 06/12/2022    HCT 46 06/12/2022    MCV 91 06/12/2022     06/12/2022     Lab Results   Component Value Date    CHOL 206 (H) 12/02/2021    CHOL 179 11/12/2018    CHOL 181 11/07/2009     Lab Results   Component Value Date    HDL 50 12/02/2021    HDL 41 11/12/2018    HDL 40 11/07/2009     Lab Results   Component Value Date    LDLCALC 135.6 12/02/2021    LDLCALC 103.4 11/12/2018    LDLCALC 120.2 11/07/2009     Lab Results   Component Value Date    TRIG 102 12/02/2021    TRIG 173 (H) 11/12/2018    TRIG 104 11/07/2009     Lab Results   Component Value Date    CHOLHDL 24.3 12/02/2021    CHOLHDL 22.9 11/12/2018    CHOLHDL 22.1 11/07/2009     Lab  Results   Component Value Date    TSH 0.844 12/02/2021       ASSESSMENT/PLAN     Donell Park is a 46 y.o. male     Donell was seen today for ER follow-up. He has shingles with concern for left sided occular complication. Will refer urgently to Ophtho and will start gabapentin for pain mgmt. He is aware of ED prompts and instructed to follow-up with PCP in 1-2 weeks for symptom re-check.     Diagnoses and all orders for this visit:    Herpes zoster with complication  -     Ambulatory referral/consult to Ophthalmology; Future    Other orders  -     gabapentin (NEURONTIN) 100 MG capsule; Take 3 capsules (300 mg total) by mouth every evening. for 15 days       11:13 AM  Discussed case with Optho triage RN.   9:45 AM Dr. bennett at [facility] 10th floor.         Carole Pradhan PA-C   Department of Internal Medicine - [facility]   10:37 AM

## 2022-06-15 NOTE — LETTER
Ninfa 15, 2022      [facility]  [address]  [facility] [address]  Phone: [phone]  Fax: [phone]       Patient: Donell Park   YOB: 1975  Date of Visit: 06/15/2022    To Whom It May Concern:    Elizabeth Park  was at [facility] on 06/15/2022. The patient may return to work on 6/22/2022  with no restrictions. If you have any questions or concerns, or if I can be of further assistance, please do not hesitate to contact me.    Sincerely,        Carole Pradhan PA-C

## 2022-06-16 ENCOUNTER — OFFICE VISIT (OUTPATIENT)
Dept: OPHTHALMOLOGY | Facility: CLINIC | Age: 47
End: 2022-06-16
Payer: MEDICAID

## 2022-06-16 DIAGNOSIS — B02.30 HERPES ZOSTER OPHTHALMICUS OF LEFT EYE: Primary | ICD-10-CM

## 2022-06-16 DIAGNOSIS — B02.8 HERPES ZOSTER WITH COMPLICATION: ICD-10-CM

## 2022-06-16 PROCEDURE — 1160F RVW MEDS BY RX/DR IN RCRD: CPT | Mod: CPTII,,, | Performed by: OPHTHALMOLOGY

## 2022-06-16 PROCEDURE — 99203 OFFICE O/P NEW LOW 30 MIN: CPT | Mod: S$PBB,,, | Performed by: OPHTHALMOLOGY

## 2022-06-16 PROCEDURE — 99999 PR PBB SHADOW E&M-EST. PATIENT-LVL III: CPT | Mod: PBBFAC,,, | Performed by: OPHTHALMOLOGY

## 2022-06-16 PROCEDURE — 1159F PR MEDICATION LIST DOCUMENTED IN MEDICAL RECORD: ICD-10-PCS | Mod: CPTII,,, | Performed by: OPHTHALMOLOGY

## 2022-06-16 PROCEDURE — 99213 OFFICE O/P EST LOW 20 MIN: CPT | Mod: PBBFAC | Performed by: OPHTHALMOLOGY

## 2022-06-16 PROCEDURE — 1160F PR REVIEW ALL MEDS BY PRESCRIBER/CLIN PHARMACIST DOCUMENTED: ICD-10-PCS | Mod: CPTII,,, | Performed by: OPHTHALMOLOGY

## 2022-06-16 PROCEDURE — 1159F MED LIST DOCD IN RCRD: CPT | Mod: CPTII,,, | Performed by: OPHTHALMOLOGY

## 2022-06-16 PROCEDURE — 99999 PR PBB SHADOW E&M-EST. PATIENT-LVL III: ICD-10-PCS | Mod: PBBFAC,,, | Performed by: OPHTHALMOLOGY

## 2022-06-16 PROCEDURE — 99203 PR OFFICE/OUTPT VISIT, NEW, LEVL III, 30-44 MIN: ICD-10-PCS | Mod: S$PBB,,, | Performed by: OPHTHALMOLOGY

## 2022-06-16 RX ORDER — ERYTHROMYCIN 5 MG/G
OINTMENT OPHTHALMIC 3 TIMES DAILY
Qty: 1 G | Refills: 0 | Status: SHIPPED | OUTPATIENT
Start: 2022-06-16 | End: 2022-10-14

## 2022-06-16 NOTE — PROGRESS NOTES
HPI     Triage pt  Patient states dx w/shingles x 5 days ago. OS feeling of eye pain and   pressure.  6-7 on pain scale.  Vision seem to be blurry on and off w/correction.  No eye drops.  Started Valtrex x 3 days ago.    I have personally interviewed the patient, reviewed the history and   examined the patient and agree with the technician's &/or resident's exam,   assessment and plan.       Last edited by Edgar Zeng MD on 6/16/2022 10:25 AM. 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 Zoster lesions.  He states hx of zoster rash treated years ago across his back.  Currently no known immunosuppressive state. Treated for bilateral viral conjunctivitis last summer.  Denies CTL use.  Complains of deep pain, photophobia, burning sensation, intermittent slight blurriness OS only  - small patch of incomplete-dermatomal dist superficial skin lesions in tempora fossa above left cheek.   -Cornea clear AC D/Q; only with mild conjunctival injection OS   DFE unremarkable  PLAN:  Continue valacyclovir course  Erythromycin ointment TID OS  RTC PRN    For exam results, see Encounter Report.    Herpes zoster ophthalmicus of left eye    Herpes zoster with complication  -     Ambulatory referral/consult to Ophthalmology

## 2022-06-16 NOTE — LETTER
[facility] - 10th Fl  [address]  [facility] [address]  Phone: [phone]  Fax: [phone]   June 16, 2022    Carole Pradhan PA-C  [address]  [facility] [address]    Patient: Donell Park   MR Number: [id number]   YOB: 1975   Date of Visit: 6/16/2022       Dear Dr. Pradhan:    Thank you for referring Donell Park to me for evaluation. 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 Zoster lesions.  He states hx of zoster rash treated years ago across his back.  Currently no known immunosuppressive state. Treated for bilateral viral conjunctivitis last summer.  Denies CTL use.  Complains of deep pain, photophobia, burning sensation, intermittent slight blurriness OS only  - small patch of incomplete-dermatomal dist superficial skin lesions in tempora fossa above left cheek.   -Cornea clear AC D/Q; only with mild conjunctival injection OS   DFE unremarkable  PLAN:  Continue valacyclovir course  Erythromycin ointment TID OS  RTC PRN    For exam results, see Encounter Report.    Herpes zoster ophthalmicus of left eye    Herpes zoster with complication  -     Ambulatory referral/consult to Ophthalmology                Below you will find my full exam findings. If you have questions, please do not hesitate to call me. I look forward to following Mr. Donell Park along with you.    Sincerely,           Edgar Zeng MD       CC  No Recipients             Base Eye Exam     Visual Acuity (Snellen - Linear)       Right Left    Dist sc 20/20 20/30 -1          Tonometry (Tonopen, 10:21 AM)       Right Left    Pressure 15 18          Pupils       Dark Light Shape React APD    Right 3 2 Round Brisk None    Left 3 2 Round Brisk None          Visual Fields       Right Left     Full Full   See HVF report           Extraocular Movement        Right Left     Full, Ortho Full, Ortho          Neuro/Psych     Oriented x3: Yes    Mood/Affect: Normal          Dilation     Both eyes: 1% Mydriacyl, 2.5% Phenylephrine @ 10:29 AM            Slit Lamp and Fundus Exam     External Exam       Right Left    External Normal Normal          Slit Lamp Exam       Right Left    Lids/Lashes Normal Normal    Conjunctiva/Sclera White and quiet 1+ injection; lids flipped, no FB, mild injection (fine papillae)    Cornea Clear Clear, no lesions, no thinning, no opacities    Anterior Chamber Deep and quiet Deep and quiet    Iris Round and reactive Round and reactive    Lens 1+ NSC 1+ NSC    Vitreous Normal Normal          Fundus Exam       Right Left    Disc Normal Normal    C/D Ratio 0.4 0.4    Macula Normal Normal    Vessels Normal Normal    Periphery Normal Normal

## 2022-06-16 NOTE — PATIENT INSTRUCTIONS
Finish course of Valtrex. Avoid contact with people who have altered immune systems until all blisters are healed. Erythromycin ointment three times a day left eye. Return as needed.

## 2022-07-16 ENCOUNTER — HOSPITAL ENCOUNTER (EMERGENCY)
Facility: HOSPITAL | Age: 47
Discharge: HOME | End: 2022-07-16
Attending: EMERGENCY MEDICINE
Payer: COMMERCIAL

## 2022-07-16 VITALS
HEIGHT: 67 IN | TEMPERATURE: 100 F | OXYGEN SATURATION: 98 % | SYSTOLIC BLOOD PRESSURE: 148 MMHG | WEIGHT: 170 LBS | BODY MASS INDEX: 26.68 KG/M2 | RESPIRATION RATE: 16 BRPM | DIASTOLIC BLOOD PRESSURE: 89 MMHG | HEART RATE: 120 BPM

## 2022-07-16 DIAGNOSIS — R50.9 FEVER, UNSPECIFIED FEVER CAUSE: ICD-10-CM

## 2022-07-16 DIAGNOSIS — R00.0 TACHYCARDIA: ICD-10-CM

## 2022-07-16 DIAGNOSIS — U07.1 COVID-19 VIRUS INFECTION: Primary | ICD-10-CM

## 2022-07-16 LAB
CTP QC/QA: YES
CTP QC/QA: YES
POC MOLECULAR INFLUENZA A AGN: NEGATIVE
POC MOLECULAR INFLUENZA B AGN: NEGATIVE
SARS-COV-2 RDRP RESP QL NAA+PROBE: POSITIVE

## 2022-07-16 PROCEDURE — 87502 INFLUENZA DNA AMP PROBE: CPT

## 2022-07-16 PROCEDURE — 93010 EKG 12-LEAD: ICD-10-PCS | Mod: ,,, | Performed by: INTERNAL MEDICINE

## 2022-07-16 PROCEDURE — 99285 PR EMERGENCY DEPT VISIT,LEVEL V: ICD-10-PCS | Mod: CR,CS,, | Performed by: EMERGENCY MEDICINE

## 2022-07-16 PROCEDURE — 93005 ELECTROCARDIOGRAM TRACING: CPT

## 2022-07-16 PROCEDURE — 94761 N-INVAS EAR/PLS OXIMETRY MLT: CPT

## 2022-07-16 PROCEDURE — 99285 EMERGENCY DEPT VISIT HI MDM: CPT | Mod: CR,CS,, | Performed by: EMERGENCY MEDICINE

## 2022-07-16 PROCEDURE — U0002 COVID-19 LAB TEST NON-CDC: HCPCS | Performed by: EMERGENCY MEDICINE

## 2022-07-16 PROCEDURE — 93010 ELECTROCARDIOGRAM REPORT: CPT | Mod: ,,, | Performed by: INTERNAL MEDICINE

## 2022-07-16 PROCEDURE — 99283 EMERGENCY DEPT VISIT LOW MDM: CPT | Mod: 25

## 2022-07-16 NOTE — Clinical Note
"Donell "Bebeto Park was seen and treated in our emergency department on 7/16/2022.     COVID-19 is present in our communities across the state. There is limited testing for COVID at this time, so not all patients can be tested. In this situation, your employee meets the following criteria:    Donell Park has met the criteria for COVID-19 testing and has a POSITIVE result. He can return to work once they are asymptomatic for 24 hours without the use of fever reducing medications AND at least five days from the first positive result. A mask is recommended for 5 days post quarantine.     If you have any questions or concerns, or if I can be of further assistance, please do not hesitate to contact me.    Sincerely,             Miah Stringer, DO"

## 2022-07-16 NOTE — Clinical Note
"Donell"Donell" Xavier was seen and treated in our emergency department on 7/16/2022.  He may return to work on 07/21/2022.       If you have any questions or concerns, please don't hesitate to call.      Miah Stringer, DO"

## 2022-07-17 NOTE — ED PROVIDER NOTES
Encounter Date: 7/16/2022    SCRIBE #1 NOTE: I, Angela Gould, am scribing for, and in the presence of,  Miah Stringer DO. I have scribed the entire note.       History     Chief Complaint   Patient presents with    Fever     Pt reports 1.5 hours ago he checked his temp and it was 101.7 and took tylenol. Pt reports PTA it was 103. Pt denies any other symptoms other than bilateral flank area pain.     HPI     Time patient was seen by the provider: 7:46 PM    The patient is a 46 y.o. male with co-morbidities including: anxiety, substance abuse who presents to the ED with a complaint of a fever with onset this evening. He had a 101F fever at home and 1.5 hours later it was 103F. He took tylenol at home and his symptoms improved.  He denies any neck pain, headache, nausea, vomiting, chest pain, diarrhea, urinary symptoms, blood in stool, or any recent injections. He is vaccinated against COVID-19.  He reports having COVID twice in the past.  He denies any lightheadedness, dizziness, falls, trauma, rashes, skin changes, dysuria, hematochezia, melena hemoptysis or hematemesis.  He denies any shortness of breath, arm pain or jaw pain.  He denies any leg swelling.  No other aggravating or alleviating factors.    The history is provided by the patient and medical records. No  was used.     Review of patient's allergies indicates:  No Known Allergies  Past Medical History:   Diagnosis Date    Acute gastritis without hemorrhage 06/03/2019    aspirin induced    Addiction to drug     [facility]    ADHD (attention deficit hyperactivity disorder)     a [facility]    Anxiety     Attempted suicide 06/20/19    pt stated attempted 2 weeks ago- aspirin    Depression     Hallucination     Heavy smoker (more than 20 cigarettes per day)     History of psychiatric hospitalization     History of rectal bleeding 2012    OSH Colonoscopy without polyps.    Hx of psychiatric care      Major depression 6/24/2019    Major depressive disorder, recurrent, severe with psychotic features 6/25/2019    Migraine headache     Psychiatric problem     PTSD (post-traumatic stress disorder) 6/25/2019    Severe episode of recurrent major depressive disorder, with psychotic features 6/25/2019    Shingles 2013    Substance abuse     Suicide attempt     attempted twice    Suicide attempt by drug ingestion 9/16/2019    Suicide attempt by substance overdose 9/16/2019    Therapy      Past Surgical History:   Procedure Laterality Date    CYST REMOVAL N/A 2013    near heart    GANGLION CYST EXCISION Right     wrist     Family History   Problem Relation Age of Onset    Stroke Mother     Diabetes Father     Hypertension Father     Lung cancer Father     Lung cancer Maternal Grandmother     Lung cancer Maternal Grandfather     Lung cancer Paternal Grandmother     Lung cancer Paternal Grandfather     Lung cancer Maternal Uncle     Lung cancer Maternal Uncle     Depression Maternal Aunt      Social History     Tobacco Use    Smoking status: Current Every Day Smoker     Packs/day: 1.00     Types: Cigarettes     Start date: 1997    Smokeless tobacco: Current User   Substance Use Topics    Alcohol use: No    Drug use: Yes     Frequency: 3.0 times per week     Types: Marijuana     Comment: last used one month ago     Review of Systems   Constitutional: Positive for fever.   HENT: Negative for sore throat.    Respiratory: Negative for shortness of breath.    Cardiovascular: Negative for chest pain.   Gastrointestinal: Negative for blood in stool, diarrhea, nausea and vomiting.   Genitourinary: Positive for flank pain (left). Negative for difficulty urinating, dysuria, frequency and hematuria.   Musculoskeletal: Negative for back pain and neck pain.   Skin: Negative for rash.   Neurological: Negative for weakness and headaches.   Hematological: Does not bruise/bleed easily.       Physical Exam      Initial Vitals [07/16/22 1902]   BP Pulse Resp Temp SpO2   (!) 148/89 (!) 120 16 (!) 100.4 °F (38 °C) 98 %      MAP       --         Physical Exam  Nursing note and vitals reviewed.  Gen/Constitutional: Interactive. No acute distress. Febrile. Non-toxic appearing.  Head: Normocephalic, Atraumatic  Neck: supple, no masses or LAD, no JVD  Eyes: PERRLA, conjunctiva clear  Ears, Nose and Throat: No rhinorrhea or stridor.  Cardiac:  Tachycardic heart rate, Reg Rhythm, No murmur  Pulmonary: CTA Bilat, no wheezes, rhonchi, rales.  No increased work of breathing.  GI: Abdomen soft, non-tender, non-distended; no rebound or guarding  : No CVA tenderness.  Musculoskeletal: Extremities warm, well perfused, no erythema, no edema  Skin: No rashes, cyanosis or jaundice.  Neuro: Alert and Oriented x 3; No focal motor or sensory deficits.    Psych: Normal affect    ED Course   Procedures  Labs Reviewed   SARS-COV-2 RDRP GENE - Abnormal; Notable for the following components:       Result Value    POC Rapid COVID Positive (*)     All other components within normal limits    Narrative:     This test utilizes isothermal nucleic acid amplification   technology to detect the SARS-CoV-2 RdRp nucleic acid segment.   The analytical sensitivity (limit of detection) is 125 genome   equivalents/mL.   A POSITIVE result implies infection with the SARS-CoV-2 virus;   the patient is presumed to be contagious.     A NEGATIVE result means that SARS-CoV-2 nucleic acids are not   present above the limit of detection. A NEGATIVE result should be   treated as presumptive. It does not rule out the possibility of   COVID-19 and should not be the sole basis for treatment decisions.   If COVID-19 is strongly suspected based on clinical and exposure   history, re-testing using an alternate molecular assay should be   considered.   This test is only for use under the Food and Drug   Administration s Emergency Use Authorization (EUA).   Commercial kits  are provided by [employer].   Performance characteristics of the EUA have been independently   verified by [facility] Department of   Pathology and Laboratory Medicine.   _________________________________________________________________   The authorized Fact Sheet for Healthcare Providers and the authorized Fact   Sheet for Patients of the ID NOW COVID-19 are available on the FDA   website:     https://www.fda.gov/media/651297/download  https://www.fda.gov/media/623228/download           POCT INFLUENZA A/B MOLECULAR     EKG Readings: (Independently Interpreted)   Initial Reading: No STEMI. Rhythm: Normal Sinus Rhythm. Heart Rate: 100. Axis: Normal.   Normal intervals     ECG Results          EKG 12-lead (Final result)  Result time 07/17/22 09:43:52    Final result by Interface, Lab In [facility] (07/17/22 09:43:52)                 Narrative:    Test Reason : R00.0,    Vent. Rate : 112 BPM     Atrial Rate : 112 BPM     P-R Int : 148 ms          QRS Dur : 070 ms      QT Int : 334 ms       P-R-T Axes : 056 057 050 degrees     QTc Int : 455 ms    Sinus tachycardia  Nonspecific ST and/or T wave abnormalities  Abnormal ECG  When compared with ECG of 12-JUN-2022 14:48,  Nonspecific ST and/or T wave abnormalities Now present  Confirmed by Angus Juarez MD ([id number]) on 7/17/2022 9:43:42 AM    Referred By: [email or web address]   SELF           Confirmed By:Angus Juarez MD                            Imaging Results    None          Medications - No data to display  Medical Decision Making:   History:   Old Medical Records: I decided to obtain old medical records.  Initial Assessment:   The patient is a 46 y.o. male with co-morbidities including: anxiety, substance abuse who presents to the ED with a complaint of a fever with onset this evening.   Clinical Tests:   Lab Tests: Ordered and Reviewed  Medical Tests: Ordered and Reviewed  ED Management:  Will obtain EKG, COVID-19 test, flu test, basic labs    Patient is  running a low-grade fever of 100.4, down from 103 at home.  He denies any other systemic signs including chest pain, shortness of breath, dysuria, hematochezia, melena, abdominal pain, lightheadedness, headaches or neck pain.  He is vaccinated for COVID, and has had COVID twice in the past.  He denies any sore throat, throat swelling, skin changes or ulcerations.  COVID test is positive today.  His fever has improved with Tylenol.  He declined any further laboratory testing given COVID positive.  Lung sounds clear, cardiac exam unremarkable initially tachycardic to 120, improved to 99 on repeat evaluation.  Instructed on over-the-counter Tylenol, ibuprofen, and hydration at home.  Instructed to follow up with PCP if symptoms do not improve.  ECG without ischemia or STEMI on my read. Patient agreeable to discharge plan. Strict ED precautions and return instructions discussed at length and patient verbalized understanding. All questions were answered and ample time was given for questions.      Complexity:  Moderate high risk          Scribe Attestation:   Scribe #1: I performed the above scribed service and the documentation accurately describes the services I performed. I attest to the accuracy of the note.               I, Dr. Miah Stringer, personally performed the services described in this documentation. All medical record entries made by the scribe were at my direction and in my presence.  I have reviewed the chart and agree that the record reflects my personal performance and is accurate and complete.     Clinical Impression:   Final diagnoses:  [R00.0] Tachycardia  [U07.1] COVID-19 virus infection (Primary)  [R50.9] Fever, unspecified fever cause          ED Disposition Condition    Discharge Stable        ED Prescriptions     None        Follow-up Information     Follow up With Specialties Details Why Contact Info    Yenny Resendiz MD Internal Medicine Schedule an appointment as soon as possible for a  visit in 1 week As needed, If symptoms worsen [address]  [address]  [facility] [address]  [phone]           Miah Stringer DO, FAAEM  Emergency Staff Physician   Dept of Emergency Medicine   [facility]  Spectralink: [id number]        Disclaimer: This note has been generated using voice-recognition software. There may be typographical errors that have been missed during proof-reading.       Miah Stringer DO  07/17/22 0222

## 2022-07-17 NOTE — ED NOTES
Bed: [facility]  Expected date:   Expected time:   Means of arrival:   Comments:  Intake

## 2022-07-17 NOTE — DISCHARGE INSTRUCTIONS
Today, you have been diagnosed with COVID-19.  He had a fever causing you have a high heart rate.  He took Tylenol and fever improved.  It is important that you continue taking Tylenol, keep yourself hydrated and follow-up with your doctor if her symptoms do not improve.  He may also take ibuprofen as needed.  Please read the handout given to you COVID-19.    Our goal in the emergency department is to always give you outstanding care and exceptional service. You may receive a survey by mail or e-mail in the next week regarding your experience in our ED. We would greatly appreciate your completing and returning the survey. Your feedback provides us with a way to recognize our staff who give very good care and it helps us learn how to improve when your experience was below our aspiration of excellence.

## 2022-09-10 ENCOUNTER — HOSPITAL ENCOUNTER (EMERGENCY)
Facility: HOSPITAL | Age: 47
Discharge: HOME | End: 2022-09-10
Attending: EMERGENCY MEDICINE
Payer: COMMERCIAL

## 2022-09-10 VITALS
HEART RATE: 80 BPM | SYSTOLIC BLOOD PRESSURE: 137 MMHG | WEIGHT: 170 LBS | HEIGHT: 67 IN | TEMPERATURE: 98 F | DIASTOLIC BLOOD PRESSURE: 83 MMHG | RESPIRATION RATE: 18 BRPM | BODY MASS INDEX: 26.68 KG/M2 | OXYGEN SATURATION: 99 %

## 2022-09-10 DIAGNOSIS — L03.116 CELLULITIS OF LEFT FOOT: Primary | ICD-10-CM

## 2022-09-10 PROCEDURE — 25000003 PHARM REV CODE 250: Performed by: PHYSICIAN ASSISTANT

## 2022-09-10 PROCEDURE — 99284 EMERGENCY DEPT VISIT MOD MDM: CPT | Mod: 25

## 2022-09-10 RX ORDER — SULFAMETHOXAZOLE AND TRIMETHOPRIM 800; 160 MG/1; MG/1
1 TABLET ORAL
Status: COMPLETED | OUTPATIENT
Start: 2022-09-10 | End: 2022-09-10

## 2022-09-10 RX ORDER — SULFAMETHOXAZOLE AND TRIMETHOPRIM 800; 160 MG/1; MG/1
1 TABLET ORAL 2 TIMES DAILY
Qty: 20 TABLET | Refills: 0 | Status: SHIPPED | OUTPATIENT
Start: 2022-09-10 | End: 2022-09-20

## 2022-09-10 RX ORDER — MELOXICAM 7.5 MG/1
7.5 TABLET ORAL DAILY
Qty: 12 TABLET | Refills: 0 | Status: SHIPPED | OUTPATIENT
Start: 2022-09-10 | End: 2022-10-14

## 2022-09-10 RX ADMIN — SULFAMETHOXAZOLE AND TRIMETHOPRIM 1 TABLET: 800; 160 TABLET ORAL at 12:09

## 2022-09-10 NOTE — DISCHARGE INSTRUCTIONS

## 2022-09-10 NOTE — ED TRIAGE NOTES
"Patient arrived to the ER via personal transport w CC: pain and obvious infectious process to LT foot/ 5/4/5 toe. Pain rated 9/10. Difficult to adequately assess as "it hurts too much". Was seen at an urgent care yesterday and has prescribed Rxs. Denies: CP/HA/SOB/Dizziness.  "

## 2022-09-10 NOTE — ED PROVIDER NOTES
Encounter Date: 9/10/2022    SCRIBE #1 NOTE: I, Va Zimmerman, am scribing for, and in the presence of,  ALON Camacho. I have scribed the following portions of the note - Other sections scribed: HPI, ROS.     History     Chief Complaint   Patient presents with    Foot Pain     Pt c/o (L) foot pain and rash X 4 days     Donell Park is a 46 y.o. male, with no pertinent PMHx, who presents to the ED with left foot pain onset 4 days ago. Patient reports the pain has worsened since then. Patient endorses he went to Urgent Care and was prescribed doxycycline and anti-fungal medication; compliant with for 2 days. No other exacerbating or alleviating factors. Denies trauma or other associated symptoms. Denies fever, numbness, and emesis.     The history is provided by the patient.   Review of patient's allergies indicates:  No Known Allergies  Past Medical History:   Diagnosis Date    Acute gastritis without hemorrhage 06/03/2019    aspirin induced    Addiction to drug     [facility]    ADHD (attention deficit hyperactivity disorder)     a few years ago at [facility]    Anxiety     Attempted suicide 06/20/19    pt stated attempted 2 weeks ago- aspirin    Depression     Hallucination     Heavy smoker (more than 20 cigarettes per day)     History of psychiatric hospitalization     History of rectal bleeding 2012    OSH Colonoscopy without polyps.    Hx of psychiatric care     Major depression 6/24/2019    Major depressive disorder, recurrent, severe with psychotic features 6/25/2019    Migraine headache     Psychiatric problem     PTSD (post-traumatic stress disorder) 6/25/2019    Severe episode of recurrent major depressive disorder, with psychotic features 6/25/2019    Shingles 2013    Substance abuse     Suicide attempt     attempted twice    Suicide attempt by drug ingestion 9/16/2019    Suicide attempt by substance overdose 9/16/2019    Therapy      Past Surgical History:   Procedure  Laterality Date    CYST REMOVAL N/A 2013    near heart    GANGLION CYST EXCISION Right     wrist     Family History   Problem Relation Age of Onset    Stroke Mother     Diabetes Father     Hypertension Father     Lung cancer Father     Lung cancer Maternal Grandmother     Lung cancer Maternal Grandfather     Lung cancer Paternal Grandmother     Lung cancer Paternal Grandfather     Lung cancer Maternal Uncle     Lung cancer Maternal Uncle     Depression Maternal Aunt      Social History     Tobacco Use    Smoking status: Every Day     Packs/day: 1.00     Types: Cigarettes     Start date: 1997    Smokeless tobacco: Current   Substance Use Topics    Alcohol use: No    Drug use: Yes     Frequency: 3.0 times per week     Types: Marijuana     Comment: last used one month ago     Review of Systems   Constitutional:  Negative for fever.        Negative for trauma.    HENT:  Negative for congestion, sore throat and trouble swallowing.    Respiratory:  Negative for cough and shortness of breath.    Cardiovascular:  Negative for chest pain.   Gastrointestinal:  Negative for abdominal pain, constipation, diarrhea, nausea and vomiting.   Genitourinary:  Negative for dysuria, flank pain, frequency and urgency.   Musculoskeletal:  Negative for back pain, joint swelling and neck pain.        Positive for left foot pain.    Skin:  Negative for color change, rash and wound.   Neurological:  Negative for numbness and headaches.   All other systems reviewed and are negative.    Physical Exam     Initial Vitals [09/10/22 1210]   BP Pulse Resp Temp SpO2   137/83 80 18 98.1 °F (36.7 °C) 99 %      MAP       --         Physical Exam    Nursing note and vitals reviewed.  Constitutional: He appears well-developed and well-nourished. He is not diaphoretic. No distress.   HENT:   Head: Atraumatic.   Right Ear: External ear normal.   Left Ear: External ear normal.   Eyes: Conjunctivae are normal.   Neck: No tracheal deviation present.   Normal  range of motion.  Cardiovascular:  Normal rate and regular rhythm.           Pulmonary/Chest: No accessory muscle usage or stridor. No tachypnea. No respiratory distress.   Musculoskeletal:      Cervical back: Normal range of motion.      Comments: Erythema, tenderness, and swelling to the dorsal left foot over the base of the 4th and 5th toes.  Full ROM of digits.  Skin perfusion normal.  Ambulatory.  Capillary refill less than 2 seconds.  Plantar surface and web spaces of digits normal.     Neurological: He has normal strength. He displays no tremor. He displays no seizure activity. Coordination and gait normal.   Skin: Skin is intact. Capillary refill takes less than 2 seconds. No cyanosis.         ED Course   Procedures  Labs Reviewed - No data to display       Imaging Results    None          Medications   sulfamethoxazole-trimethoprim 800-160mg per tablet 1 tablet (1 tablet Oral Given 9/10/22 1248)     Medical Decision Making:   History:   Old Medical Records: I decided to obtain old medical records.  ED Management:  Cellulitis.  No discernible abscess.  No evidence of septic joint or flexor tendon involvement.  No systemic symptoms.  Minimal time on doxy; I do not feel he failed outpatient therapy yet. Will add Bactrim to his doxycycline regimen.  Advising follow-up with PCP. Strict return precautions discussed.  Agreeable to plan.        Scribe Attestation:   Scribe #1: I performed the above scribed service and the documentation accurately describes the services I performed. I attest to the accuracy of the note.               Clinical Impression:   Final diagnoses:  [L03.116] Cellulitis of left foot (Primary)     I, Cole Crawford PA-C, personally performed the services described in this documentation. All medical record entries made by the scribe were at my direction and in my presence. I have reviewed the chart and agree that the record reflects my personal performance and is accurate and complete.    ED  Disposition Condition    Discharge Stable          ED Prescriptions       Medication Sig Dispense Start Date End Date Auth. Provider    sulfamethoxazole-trimethoprim 800-160mg (BACTRIM DS) 800-160 mg Tab Take 1 tablet by mouth 2 (two) times daily. for 10 days 20 tablet 9/10/2022 9/20/2022 Cole Crawford PA-C    meloxicam (MOBIC) 7.5 MG tablet Take 1 tablet (7.5 mg total) by mouth once daily. 12 tablet 9/10/2022 -- Cole Crawford PA-C          Follow-up Information       Follow up With Specialties Details Why Contact Info    Yenny Resendiz MD Internal Medicine Schedule an appointment as soon as possible for a visit in 1 day For re-evaluation [address] [facility] [address]  [facility] [address]  [phone]      [facility] - Emergency Dept Emergency Medicine Go to  If symptoms worsen [address] [facility] [address] [phone]             Cole Crawford PA-C  09/10/22 3393

## 2022-09-13 ENCOUNTER — HOSPITAL ENCOUNTER (EMERGENCY)
Facility: HOSPITAL | Age: 47
Discharge: HOME | End: 2022-09-13
Attending: INTERNAL MEDICINE
Payer: COMMERCIAL

## 2022-09-13 VITALS
SYSTOLIC BLOOD PRESSURE: 116 MMHG | TEMPERATURE: 99 F | BODY MASS INDEX: 26.63 KG/M2 | WEIGHT: 170 LBS | OXYGEN SATURATION: 95 % | RESPIRATION RATE: 18 BRPM | HEART RATE: 89 BPM | DIASTOLIC BLOOD PRESSURE: 79 MMHG

## 2022-09-13 DIAGNOSIS — L03.116 CELLULITIS OF LEFT FOOT: ICD-10-CM

## 2022-09-13 LAB
ALBUMIN SERPL BCP-MCNC: 4 G/DL (ref 3.5–5.2)
ALP SERPL-CCNC: 86 U/L (ref 55–135)
ALT SERPL W/O P-5'-P-CCNC: 13 U/L (ref 10–44)
ANION GAP SERPL CALC-SCNC: 11 MMOL/L (ref 8–16)
AST SERPL-CCNC: 17 U/L (ref 10–40)
BASOPHILS # BLD AUTO: 0.07 K/UL (ref 0–0.2)
BASOPHILS NFR BLD: 0.8 % (ref 0–1.9)
BILIRUB SERPL-MCNC: 0.7 MG/DL (ref 0.1–1)
BUN SERPL-MCNC: 14 MG/DL (ref 6–20)
CALCIUM SERPL-MCNC: 9.8 MG/DL (ref 8.7–10.5)
CHLORIDE SERPL-SCNC: 106 MMOL/L (ref 95–110)
CO2 SERPL-SCNC: 21 MMOL/L (ref 23–29)
CREAT SERPL-MCNC: 1.2 MG/DL (ref 0.5–1.4)
CRP SERPL-MCNC: 15.4 MG/L (ref 0–8.2)
DIFFERENTIAL METHOD: ABNORMAL
EOSINOPHIL # BLD AUTO: 0.3 K/UL (ref 0–0.5)
EOSINOPHIL NFR BLD: 2.7 % (ref 0–8)
ERYTHROCYTE [DISTWIDTH] IN BLOOD BY AUTOMATED COUNT: 13.6 % (ref 11.5–14.5)
ERYTHROCYTE [SEDIMENTATION RATE] IN BLOOD BY WESTERGREN METHOD: 7 MM/HR (ref 0–10)
EST. GFR  (NO RACE VARIABLE): >60 ML/MIN/1.73 M^2
GLUCOSE SERPL-MCNC: 104 MG/DL (ref 70–110)
HCT VFR BLD AUTO: 46.3 % (ref 40–54)
HGB BLD-MCNC: 17 G/DL (ref 14–18)
IMM GRANULOCYTES # BLD AUTO: 0.02 K/UL (ref 0–0.04)
IMM GRANULOCYTES NFR BLD AUTO: 0.2 % (ref 0–0.5)
LYMPHOCYTES # BLD AUTO: 2.1 K/UL (ref 1–4.8)
LYMPHOCYTES NFR BLD: 22.6 % (ref 18–48)
MCH RBC QN AUTO: 32.5 PG (ref 27–31)
MCHC RBC AUTO-ENTMCNC: 36.7 G/DL (ref 32–36)
MCV RBC AUTO: 89 FL (ref 82–98)
MONOCYTES # BLD AUTO: 1.4 K/UL (ref 0.3–1)
MONOCYTES NFR BLD: 15.1 % (ref 4–15)
NEUTROPHILS # BLD AUTO: 5.4 K/UL (ref 1.8–7.7)
NEUTROPHILS NFR BLD: 58.6 % (ref 38–73)
NRBC BLD-RTO: 0 /100 WBC
PLATELET # BLD AUTO: 335 K/UL (ref 150–450)
PMV BLD AUTO: 10 FL (ref 9.2–12.9)
POTASSIUM SERPL-SCNC: 4.4 MMOL/L (ref 3.5–5.1)
PROT SERPL-MCNC: 7.7 G/DL (ref 6–8.4)
RBC # BLD AUTO: 5.23 M/UL (ref 4.6–6.2)
SODIUM SERPL-SCNC: 138 MMOL/L (ref 136–145)
WBC # BLD AUTO: 9.23 K/UL (ref 3.9–12.7)

## 2022-09-13 PROCEDURE — 96374 THER/PROPH/DIAG INJ IV PUSH: CPT

## 2022-09-13 PROCEDURE — 85652 RBC SED RATE AUTOMATED: CPT | Performed by: EMERGENCY MEDICINE

## 2022-09-13 PROCEDURE — 80053 COMPREHEN METABOLIC PANEL: CPT | Performed by: EMERGENCY MEDICINE

## 2022-09-13 PROCEDURE — 85025 COMPLETE CBC W/AUTO DIFF WBC: CPT | Performed by: EMERGENCY MEDICINE

## 2022-09-13 PROCEDURE — 86140 C-REACTIVE PROTEIN: CPT | Performed by: EMERGENCY MEDICINE

## 2022-09-13 PROCEDURE — 63600175 PHARM REV CODE 636 W HCPCS: Performed by: EMERGENCY MEDICINE

## 2022-09-13 PROCEDURE — 99284 EMERGENCY DEPT VISIT MOD MDM: CPT | Mod: 25

## 2022-09-13 RX ORDER — KETOROLAC TROMETHAMINE 30 MG/ML
15 INJECTION, SOLUTION INTRAMUSCULAR; INTRAVENOUS
Status: COMPLETED | OUTPATIENT
Start: 2022-09-13 | End: 2022-09-13

## 2022-09-13 RX ADMIN — KETOROLAC TROMETHAMINE 15 MG: 30 INJECTION, SOLUTION INTRAMUSCULAR; INTRAVENOUS at 05:09

## 2022-09-13 NOTE — ED TRIAGE NOTES
Pt reports to the ED with C/O left  foot pain and cellulitis x 1 week. Pt has redness and swelling between the 2nd, 3rd, and 4th digits on the right foot. Pt reports pain with ambulation. Pt describes the pain as throbbing and sharp. Pt has full sensation and less than 2 second cap refill on the left toes. Pt AAOx4, RESP easy and unlabored. ED workup in progress, SR up x2, bed in low locked position. Monitors on in place. Will monitor.

## 2022-09-13 NOTE — ED NOTES
Patient is a hard stick and would rather wait for a room and get stuck once.   Triage RN notified.

## 2022-09-13 NOTE — FIRST PROVIDER EVALUATION
Medical screening examination initiated.  I have conducted a focused provider triage encounter, findings are as follows:    Brief history of present illness:  45 yo p/w 1 week of foot pain and swelling. Here Saturday, received antibiotics. Reports symptoms getting worse.    There were no vitals filed for this visit.    Pertinent physical exam:  warmth, tenderness, swelling of base of 3rd and 4th left toes    Brief workup plan:  Labs, analgesia.    Preliminary workup initiated; this workup will be continued and followed by the physician or advanced practice provider that is assigned to the patient when roomed.   denies

## 2022-09-13 NOTE — ED PROVIDER NOTES
Encounter Date: 9/13/2022       History     Chief Complaint   Patient presents with    Wound Check     Left foot wound since last Tuesday, has been getting worse over the past few days even though he's on abx     46-year-old male with a past medical history significant for to substance abuse and depression presents to the emergency department complaining of left foot wound.  Patient was seen 3 days ago at this facility with the same complaint and diagnosed with cellulitis.  He was prescribed antibiotics, but states wound seems to be getting worse.  He denies fever/chills/nausea/vomiting/chest pain/shortness of breath.    The history is provided by the patient. No  was used.   Review of patient's allergies indicates:  No Known Allergies  Past Medical History:   Diagnosis Date    Acute gastritis without hemorrhage 06/03/2019    aspirin induced    Addiction to drug     [facility]    ADHD (attention deficit hyperactivity disorder)     a few years ago at [facility]    Anxiety     Attempted suicide 06/20/19    pt stated attempted 2 weeks ago- aspirin    Depression     Hallucination     Heavy smoker (more than 20 cigarettes per day)     History of psychiatric hospitalization     History of rectal bleeding 2012    OSH Colonoscopy without polyps.    Hx of psychiatric care     Major depression 6/24/2019    Major depressive disorder, recurrent, severe with psychotic features 6/25/2019    Migraine headache     Psychiatric problem     PTSD (post-traumatic stress disorder) 6/25/2019    Severe episode of recurrent major depressive disorder, with psychotic features 6/25/2019    Shingles 2013    Substance abuse     Suicide attempt     attempted twice    Suicide attempt by drug ingestion 9/16/2019    Suicide attempt by substance overdose 9/16/2019    Therapy      Past Surgical History:   Procedure Laterality Date    CYST REMOVAL N/A 2013    near heart    GANGLION CYST EXCISION Right     wrist     Family  History   Problem Relation Age of Onset    Stroke Mother     Diabetes Father     Hypertension Father     Lung cancer Father     Lung cancer Maternal Grandmother     Lung cancer Maternal Grandfather     Lung cancer Paternal Grandmother     Lung cancer Paternal Grandfather     Lung cancer Maternal Uncle     Lung cancer Maternal Uncle     Depression Maternal Aunt      Social History     Tobacco Use    Smoking status: Every Day     Packs/day: 1.00     Types: Cigarettes     Start date: 1997    Smokeless tobacco: Current   Substance Use Topics    Alcohol use: No    Drug use: Yes     Frequency: 3.0 times per week     Types: Marijuana     Comment: last used one month ago     Review of Systems   Constitutional:  Negative for chills and fever.   Respiratory:  Negative for shortness of breath.    Cardiovascular:  Negative for chest pain.   Skin:         Foot wound   All other systems reviewed and are negative.    Physical Exam     Initial Vitals [09/13/22 1611]   BP Pulse Resp Temp SpO2   (!) 170/93 109 18 98.7 °F (37.1 °C) 98 %      MAP       --         Physical Exam    Nursing note and vitals reviewed.  Constitutional: He appears well-developed and well-nourished. He is not diaphoretic. No distress.   HENT:   Head: Normocephalic and atraumatic.   Right Ear: External ear normal.   Left Ear: External ear normal.   Eyes: Conjunctivae are normal.   Neck:   Normal range of motion.  Cardiovascular:  Normal rate and regular rhythm.           Pulmonary/Chest: Breath sounds normal. No respiratory distress. He has no wheezes.   Abdominal: Abdomen is soft. Bowel sounds are normal.   Musculoskeletal:         General: Normal range of motion.      Cervical back: Normal range of motion.     Neurological: He is alert.   Skin: Skin is warm and dry. Capillary refill takes less than 2 seconds.   5 cm diameter area of erythema to the left dorsal region of the MP joints of the 2nd 3rd and 4th toes with slight tenderness to palpation, slight  induration and no fluctuance.  See pictures below.   Psychiatric: He has a normal mood and affect. Thought content normal.             ED Course   Procedures  Labs Reviewed   CBC W/ AUTO DIFFERENTIAL - Abnormal; Notable for the following components:       Result Value    MCH 32.5 (*)     MCHC 36.7 (*)     Mono # 1.4 (*)     Mono % 15.1 (*)     All other components within normal limits   COMPREHENSIVE METABOLIC PANEL - Abnormal; Notable for the following components:    CO2 21 (*)     All other components within normal limits   C-REACTIVE PROTEIN - Abnormal; Notable for the following components:    CRP 15.4 (*)     All other components within normal limits   SEDIMENTATION RATE          Imaging Results              X-Ray Foot Complete Left (Final result)  Result time 09/13/22 16:26:21      Final result by Rico Chong MD (09/13/22 16:26:21)                   Impression:      Unremarkable examination.      Electronically signed by: Rico Chong MD  Date:    09/13/2022  Time:    16:26               Narrative:    EXAMINATION:  XR FOOT COMPLETE 3 VIEW LEFT    CLINICAL HISTORY:  Cellulitis of left lower limb    TECHNIQUE:  AP, lateral and oblique views of the left foot were performed.    COMPARISON:  04/15/2021.    FINDINGS:  The bone mineralization is within normal limits.  There is no cortical step-off.  There is no evidence of periostitis.    The joint spaces are maintained.  The soft tissues are unremarkable.  No radiopaque foreign body is identified.    There is no evidence of a fracture or dislocation of the left foot.                                       Medications   ketorolac injection 15 mg (15 mg Intravenous Given 9/13/22 5199)     Medical Decision Making:   Initial Assessment:   46-year-old male with a past medical history significant for to substance abuse and depression presents to the emergency department complaining of left foot wound.  Patient was seen 3 days ago at this facility with the same complaint  and diagnosed with cellulitis.  He was prescribed antibiotics, but states wound seems to be getting worse.  He denies fever/chills/nausea/vomiting/chest pain/shortness of breath.  ED Management:  CBC reveals normal white blood cell count.  Sed rate, CRP and CMP were also ordered.  X-ray of left foot reveals no acute abnormalities.  Patient was given instructions for cellulitis and advised to continue antibiotics until completion of course.  He was also advised to follow-up with his primary care physician within the next week for re-evaluation/return to the emergency department if condition worsens.                        Clinical Impression:   Final diagnoses:  [L03.116] Cellulitis of left foot        ED Disposition Condition    Discharge Stable          ED Prescriptions    None       Follow-up Information       Follow up With Specialties Details Why Contact Info    Yenny Resendiz MD Internal Medicine Schedule an appointment as soon as possible for a visit in 1 week For reevaluation [id number] [id number]  [place] [id number]  [phone]               Juancarlos Lopez MD  09/17/22 2019

## 2022-10-14 ENCOUNTER — PATIENT MESSAGE (OUTPATIENT)
Dept: INTERNAL MEDICINE | Facility: CLINIC | Age: 47
End: 2022-10-14

## 2022-10-14 ENCOUNTER — OFFICE VISIT (OUTPATIENT)
Dept: INTERNAL MEDICINE | Facility: CLINIC | Age: 47
End: 2022-10-14
Payer: COMMERCIAL

## 2022-10-14 VITALS
WEIGHT: 199.75 LBS | DIASTOLIC BLOOD PRESSURE: 62 MMHG | SYSTOLIC BLOOD PRESSURE: 125 MMHG | BODY MASS INDEX: 31.35 KG/M2 | HEART RATE: 87 BPM | OXYGEN SATURATION: 98 % | HEIGHT: 67 IN

## 2022-10-14 DIAGNOSIS — F41.9 ANXIETY: ICD-10-CM

## 2022-10-14 DIAGNOSIS — G47.30 SLEEP APNEA, UNSPECIFIED TYPE: ICD-10-CM

## 2022-10-14 DIAGNOSIS — F90.9 ATTENTION DEFICIT HYPERACTIVITY DISORDER (ADHD), UNSPECIFIED ADHD TYPE: ICD-10-CM

## 2022-10-14 DIAGNOSIS — Z78.9 ALCOHOL USE: ICD-10-CM

## 2022-10-14 DIAGNOSIS — M62.838 MUSCLE SPASMS OF BOTH LOWER EXTREMITIES: ICD-10-CM

## 2022-10-14 DIAGNOSIS — F43.21 GRIEF: Primary | ICD-10-CM

## 2022-10-14 DIAGNOSIS — G47.09 OTHER INSOMNIA: ICD-10-CM

## 2022-10-14 DIAGNOSIS — F33.1 MODERATE EPISODE OF RECURRENT MAJOR DEPRESSIVE DISORDER: ICD-10-CM

## 2022-10-14 PROBLEM — R03.0 ELEVATED BLOOD PRESSURE READING IN OFFICE WITHOUT DIAGNOSIS OF HYPERTENSION: Status: RESOLVED | Noted: 2019-06-25 | Resolved: 2022-10-14

## 2022-10-14 PROBLEM — H10.9 CONJUNCTIVITIS OF BOTH EYES: Status: RESOLVED | Noted: 2021-06-25 | Resolved: 2022-10-14

## 2022-10-14 PROBLEM — H57.89 EYE IRRITATION: Status: RESOLVED | Noted: 2021-06-25 | Resolved: 2022-10-14

## 2022-10-14 PROBLEM — L03.116 CELLULITIS OF LEFT FOOT: Status: RESOLVED | Noted: 2022-09-13 | Resolved: 2022-10-14

## 2022-10-14 PROBLEM — R68.82 DECREASED LIBIDO: Status: RESOLVED | Noted: 2020-02-21 | Resolved: 2022-10-14

## 2022-10-14 PROCEDURE — 1160F RVW MEDS BY RX/DR IN RCRD: CPT | Mod: CPTII,S$GLB,, | Performed by: INTERNAL MEDICINE

## 2022-10-14 PROCEDURE — 99999 PR PBB SHADOW E&M-EST. PATIENT-LVL V: CPT | Mod: PBBFAC,,, | Performed by: INTERNAL MEDICINE

## 2022-10-14 PROCEDURE — 3074F SYST BP LT 130 MM HG: CPT | Mod: CPTII,S$GLB,, | Performed by: INTERNAL MEDICINE

## 2022-10-14 PROCEDURE — 1159F PR MEDICATION LIST DOCUMENTED IN MEDICAL RECORD: ICD-10-PCS | Mod: CPTII,S$GLB,, | Performed by: INTERNAL MEDICINE

## 2022-10-14 PROCEDURE — 1159F MED LIST DOCD IN RCRD: CPT | Mod: CPTII,S$GLB,, | Performed by: INTERNAL MEDICINE

## 2022-10-14 PROCEDURE — 99417 PR PROLONGED SVC, OUTPT, W/WO DIRECT PT CONTACT,  EA ADDTL 15 MIN: ICD-10-PCS | Mod: S$GLB,,, | Performed by: INTERNAL MEDICINE

## 2022-10-14 PROCEDURE — 99215 OFFICE O/P EST HI 40 MIN: CPT | Mod: S$GLB,,, | Performed by: INTERNAL MEDICINE

## 2022-10-14 PROCEDURE — 1160F PR REVIEW ALL MEDS BY PRESCRIBER/CLIN PHARMACIST DOCUMENTED: ICD-10-PCS | Mod: CPTII,S$GLB,, | Performed by: INTERNAL MEDICINE

## 2022-10-14 PROCEDURE — 3078F DIAST BP <80 MM HG: CPT | Mod: CPTII,S$GLB,, | Performed by: INTERNAL MEDICINE

## 2022-10-14 PROCEDURE — 3078F PR MOST RECENT DIASTOLIC BLOOD PRESSURE < 80 MM HG: ICD-10-PCS | Mod: CPTII,S$GLB,, | Performed by: INTERNAL MEDICINE

## 2022-10-14 PROCEDURE — 99215 PR OFFICE/OUTPT VISIT, EST, LEVL V, 40-54 MIN: ICD-10-PCS | Mod: S$GLB,,, | Performed by: INTERNAL MEDICINE

## 2022-10-14 PROCEDURE — 99417 PROLNG OP E/M EACH 15 MIN: CPT | Mod: S$GLB,,, | Performed by: INTERNAL MEDICINE

## 2022-10-14 PROCEDURE — 99999 PR PBB SHADOW E&M-EST. PATIENT-LVL V: ICD-10-PCS | Mod: PBBFAC,,, | Performed by: INTERNAL MEDICINE

## 2022-10-14 PROCEDURE — 3074F PR MOST RECENT SYSTOLIC BLOOD PRESSURE < 130 MM HG: ICD-10-PCS | Mod: CPTII,S$GLB,, | Performed by: INTERNAL MEDICINE

## 2022-10-14 RX ORDER — GABAPENTIN 300 MG/1
300 CAPSULE ORAL NIGHTLY
Qty: 30 CAPSULE | Refills: 11 | Status: SHIPPED | OUTPATIENT
Start: 2022-10-14 | End: 2023-02-14
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
## 2022-10-14 NOTE — PATIENT INSTRUCTIONS
Tests to Keep You Healthy    Tobacco Cessation: TERI Marley,     If you are due for any health screening(s) below please notify me so we can arrange them to be ordered and scheduled to maintain your health. Most healthy patients complete it. Don't lose out on improving your health.     Tests to Keep You Healthy    Tobacco Cessation: NO

## 2022-11-17 ENCOUNTER — PATIENT MESSAGE (OUTPATIENT)
Dept: PSYCHIATRY | Facility: OTHER | Age: 47
End: 2022-11-17
Payer: COMMERCIAL

## 2022-11-21 ENCOUNTER — PATIENT MESSAGE (OUTPATIENT)
Dept: ADMINISTRATIVE | Facility: HOSPITAL | Age: 47
End: 2022-11-21
Payer: COMMERCIAL

## 2022-12-05 ENCOUNTER — PATIENT MESSAGE (OUTPATIENT)
Dept: PSYCHIATRY | Facility: OTHER | Age: 47
End: 2022-12-05
Payer: COMMERCIAL

## 2022-12-20 ENCOUNTER — OFFICE VISIT (OUTPATIENT)
Dept: SLEEP MEDICINE | Facility: CLINIC | Age: 47
End: 2022-12-20
Payer: COMMERCIAL

## 2022-12-20 VITALS
BODY MASS INDEX: 31.08 KG/M2 | HEART RATE: 66 BPM | DIASTOLIC BLOOD PRESSURE: 71 MMHG | WEIGHT: 198.44 LBS | SYSTOLIC BLOOD PRESSURE: 107 MMHG

## 2022-12-20 DIAGNOSIS — G47.09 OTHER INSOMNIA: ICD-10-CM

## 2022-12-20 DIAGNOSIS — G47.30 SLEEP APNEA, UNSPECIFIED TYPE: ICD-10-CM

## 2022-12-20 DIAGNOSIS — R06.83 SNORING: ICD-10-CM

## 2022-12-20 DIAGNOSIS — F51.09 OTHER INSOMNIA NOT DUE TO A SUBSTANCE OR KNOWN PHYSIOLOGICAL CONDITION: Primary | ICD-10-CM

## 2022-12-20 PROCEDURE — 99204 PR OFFICE/OUTPT VISIT, NEW, LEVL IV, 45-59 MIN: ICD-10-PCS | Mod: S$GLB,,, | Performed by: INTERNAL MEDICINE

## 2022-12-20 PROCEDURE — 3008F PR BODY MASS INDEX (BMI) DOCUMENTED: ICD-10-PCS | Mod: CPTII,S$GLB,, | Performed by: INTERNAL MEDICINE

## 2022-12-20 PROCEDURE — 3008F BODY MASS INDEX DOCD: CPT | Mod: CPTII,S$GLB,, | Performed by: INTERNAL MEDICINE

## 2022-12-20 PROCEDURE — 3078F DIAST BP <80 MM HG: CPT | Mod: CPTII,S$GLB,, | Performed by: INTERNAL MEDICINE

## 2022-12-20 PROCEDURE — 3074F PR MOST RECENT SYSTOLIC BLOOD PRESSURE < 130 MM HG: ICD-10-PCS | Mod: CPTII,S$GLB,, | Performed by: INTERNAL MEDICINE

## 2022-12-20 PROCEDURE — 99999 PR PBB SHADOW E&M-EST. PATIENT-LVL III: CPT | Mod: PBBFAC,,, | Performed by: INTERNAL MEDICINE

## 2022-12-20 PROCEDURE — 1159F PR MEDICATION LIST DOCUMENTED IN MEDICAL RECORD: ICD-10-PCS | Mod: CPTII,S$GLB,, | Performed by: INTERNAL MEDICINE

## 2022-12-20 PROCEDURE — 3078F PR MOST RECENT DIASTOLIC BLOOD PRESSURE < 80 MM HG: ICD-10-PCS | Mod: CPTII,S$GLB,, | Performed by: INTERNAL MEDICINE

## 2022-12-20 PROCEDURE — 99204 OFFICE O/P NEW MOD 45 MIN: CPT | Mod: S$GLB,,, | Performed by: INTERNAL MEDICINE

## 2022-12-20 PROCEDURE — 3074F SYST BP LT 130 MM HG: CPT | Mod: CPTII,S$GLB,, | Performed by: INTERNAL MEDICINE

## 2022-12-20 PROCEDURE — 99999 PR PBB SHADOW E&M-EST. PATIENT-LVL III: ICD-10-PCS | Mod: PBBFAC,,, | Performed by: INTERNAL MEDICINE

## 2022-12-20 PROCEDURE — 1159F MED LIST DOCD IN RCRD: CPT | Mod: CPTII,S$GLB,, | Performed by: INTERNAL MEDICINE

## 2022-12-20 NOTE — PROGRESS NOTES
"NEW PATIENT VISIT    Donell Park  is a pleasant 47 y.o. male  with PMH significant for PTSD, ADHD,  who presents for evaluation of potential for sleep apnea. States he has had trouble sleeping "since I was 14, as long as I can remember." Patient currently taking mirtazepine and gabapentin at night which do help him get to sleep. Patient mostly has issues staying asleep "since I stopped taking adderall" - states that prior to being taken off of adderall he was sleeping well.     Prior sleep studies:   Denies     Trouble falling asleep?: denies  Trouble staying asleep?: Yes   Hypnotic use?:  Mirtazepine, Gabapentin    Bed partner:    Yes   Witnessed snoring ?:   Yes   Snoring arousals?:  Yes   Witnessed apneas?  Yes     Sleepy if inactive?:  Denies   ESS:        SLEEP SCHEDULE   Bed Time 10-11   Sleep Latency 5-10 minutes    Arousals 2-3   Back to sleep Yes - "once I'm up i'm up"    Wake time 4:15 alarm time but wakes up before    Naps Denies    Nocturia 1-2x   Work  for [employer]        Vitals:    12/20/22 1334   BP: 107/71   Pulse: 66   Weight: 90 kg (198 lb 6.6 oz)       Physical Exam:    GEN:   Well-appearing  Psych:  Appropriate affect, demonstrates insight  SKIN:  No rash on the face or bridge of the nose      LABS:   Lab Results   Component Value Date    HGB 17.0 09/13/2022    CO2 21 (L) 09/13/2022       RECORDS REVIEWED PREVIOUSLY:    No prior sleep testing.    ASSESSMENT      Presenting Complaint: snoring    PROBLEM DESCRIPTION/ Sx on Presentation  STATUS   suspected DARIAN   + snoring, + snoring arousals, + witnessed apneas     New   Daytime Sx   denies sleepiness when inactive   ESS 0/24 on intake  New   Insomnia   Onset:                      Maintenance:         waking frequently, hard to get back to sleep  Prior hypnotics:        Current hypnotics: remeron, neurontin    New   Nocturia   x 1-2 per sleep period  New   Other issues: ADHD: will follow with Dr. Marks    PLAN "     -recommend sleep testing   -discussed trial therapy if DARIAN present and the patient is  open to a trial of CPAP therapy    RTC          The patient was given open opportunity to ask questions and/or express concerns about treatment plan.   All questions/concerns were discussed.     Two patient identifiers used prior to evaluation.

## 2022-12-27 ENCOUNTER — TELEPHONE (OUTPATIENT)
Dept: SLEEP MEDICINE | Facility: OTHER | Age: 47
End: 2022-12-27
Payer: COMMERCIAL
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
## 2022-12-28 ENCOUNTER — OFFICE VISIT (OUTPATIENT)
Dept: PSYCHIATRY | Facility: CLINIC | Age: 47
End: 2022-12-28
Payer: COMMERCIAL

## 2022-12-28 ENCOUNTER — OFFICE VISIT (OUTPATIENT)
Dept: PSYCHIATRY | Facility: OTHER | Age: 47
End: 2022-12-28
Payer: COMMERCIAL

## 2022-12-28 ENCOUNTER — TELEPHONE (OUTPATIENT)
Dept: SLEEP MEDICINE | Facility: OTHER | Age: 47
End: 2022-12-28
Payer: COMMERCIAL

## 2022-12-28 VITALS
TEMPERATURE: 98 F | HEART RATE: 71 BPM | SYSTOLIC BLOOD PRESSURE: 127 MMHG | DIASTOLIC BLOOD PRESSURE: 73 MMHG | WEIGHT: 194 LBS | BODY MASS INDEX: 30.39 KG/M2

## 2022-12-28 DIAGNOSIS — F41.9 ANXIETY: ICD-10-CM

## 2022-12-28 DIAGNOSIS — F33.1 MODERATE EPISODE OF RECURRENT MAJOR DEPRESSIVE DISORDER: ICD-10-CM

## 2022-12-28 DIAGNOSIS — F90.2 ADHD (ATTENTION DEFICIT HYPERACTIVITY DISORDER), COMBINED TYPE: Primary | ICD-10-CM

## 2022-12-28 DIAGNOSIS — F33.40 MDD (RECURRENT MAJOR DEPRESSIVE DISORDER) IN REMISSION: ICD-10-CM

## 2022-12-28 PROCEDURE — 1159F PR MEDICATION LIST DOCUMENTED IN MEDICAL RECORD: ICD-10-PCS | Mod: CPTII,S$GLB,, | Performed by: PSYCHIATRY & NEUROLOGY

## 2022-12-28 PROCEDURE — 90791 PSYCH DIAGNOSTIC EVALUATION: CPT | Mod: ,,, | Performed by: SOCIAL WORKER

## 2022-12-28 PROCEDURE — 90791 PR PSYCHIATRIC DIAGNOSTIC EVALUATION: ICD-10-PCS | Mod: ,,, | Performed by: SOCIAL WORKER

## 2022-12-28 PROCEDURE — 90792 PR PSYCHIATRIC DIAGNOSTIC EVALUATION W/MEDICAL SERVICES: ICD-10-PCS | Mod: S$GLB,,, | Performed by: PSYCHIATRY & NEUROLOGY

## 2022-12-28 PROCEDURE — 99999 PR PBB SHADOW E&M-EST. PATIENT-LVL III: CPT | Mod: PBBFAC,,, | Performed by: PSYCHIATRY & NEUROLOGY

## 2022-12-28 PROCEDURE — 99999 PR PBB SHADOW E&M-EST. PATIENT-LVL III: ICD-10-PCS | Mod: PBBFAC,,, | Performed by: PSYCHIATRY & NEUROLOGY

## 2022-12-28 PROCEDURE — 1159F MED LIST DOCD IN RCRD: CPT | Mod: CPTII,S$GLB,, | Performed by: PSYCHIATRY & NEUROLOGY

## 2022-12-28 PROCEDURE — 3078F PR MOST RECENT DIASTOLIC BLOOD PRESSURE < 80 MM HG: ICD-10-PCS | Mod: CPTII,S$GLB,, | Performed by: PSYCHIATRY & NEUROLOGY

## 2022-12-28 PROCEDURE — 3074F PR MOST RECENT SYSTOLIC BLOOD PRESSURE < 130 MM HG: ICD-10-PCS | Mod: CPTII,S$GLB,, | Performed by: PSYCHIATRY & NEUROLOGY

## 2022-12-28 PROCEDURE — 90792 PSYCH DIAG EVAL W/MED SRVCS: CPT | Mod: S$GLB,,, | Performed by: PSYCHIATRY & NEUROLOGY

## 2022-12-28 PROCEDURE — 3008F PR BODY MASS INDEX (BMI) DOCUMENTED: ICD-10-PCS | Mod: CPTII,S$GLB,, | Performed by: PSYCHIATRY & NEUROLOGY

## 2022-12-28 PROCEDURE — 3074F SYST BP LT 130 MM HG: CPT | Mod: CPTII,S$GLB,, | Performed by: PSYCHIATRY & NEUROLOGY

## 2022-12-28 PROCEDURE — 3008F BODY MASS INDEX DOCD: CPT | Mod: CPTII,S$GLB,, | Performed by: PSYCHIATRY & NEUROLOGY

## 2022-12-28 PROCEDURE — 3078F DIAST BP <80 MM HG: CPT | Mod: CPTII,S$GLB,, | Performed by: PSYCHIATRY & NEUROLOGY

## 2022-12-28 RX ORDER — DEXTROAMPHETAMINE SACCHARATE, AMPHETAMINE ASPARTATE MONOHYDRATE, DEXTROAMPHETAMINE SULFATE AND AMPHETAMINE SULFATE 5; 5; 5; 5 MG/1; MG/1; MG/1; MG/1
20 CAPSULE, EXTENDED RELEASE ORAL EVERY MORNING
Qty: 30 CAPSULE | Refills: 0 | Status: SHIPPED | OUTPATIENT
Start: 2022-12-28 | End: 2023-01-23 | Stop reason: SDUPTHER
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
## 2022-12-28 NOTE — PATIENT INSTRUCTIONS
Stop Delta-8 gummies for one month. Drug test next visit.  Start Adderall XR 20mg once dose is confirmed on PDMP.  Records from [facility]  Continue therapy with Dr. Machuca    Follow up in February

## 2023-01-04 DIAGNOSIS — Z12.11 COLON CANCER SCREENING: ICD-10-CM

## 2023-01-13 ENCOUNTER — TELEPHONE (OUTPATIENT)
Dept: SLEEP MEDICINE | Facility: OTHER | Age: 48
End: 2023-01-13
Payer: MEDICAID

## 2023-01-18 ENCOUNTER — PATIENT MESSAGE (OUTPATIENT)
Dept: PSYCHIATRY | Facility: CLINIC | Age: 48
End: 2023-01-18
Payer: MEDICAID

## 2023-01-24 ENCOUNTER — TELEPHONE (OUTPATIENT)
Dept: SLEEP MEDICINE | Facility: OTHER | Age: 48
End: 2023-01-24
Payer: MEDICAID

## 2023-01-28 ENCOUNTER — PATIENT MESSAGE (OUTPATIENT)
Dept: PSYCHIATRY | Facility: CLINIC | Age: 48
End: 2023-01-28
Payer: MEDICAID

## 2023-01-30 DIAGNOSIS — F90.2 ADHD (ATTENTION DEFICIT HYPERACTIVITY DISORDER), COMBINED TYPE: Primary | ICD-10-CM

## 2023-01-30 RX ORDER — LISDEXAMFETAMINE DIMESYLATE 50 MG/1
50 CAPSULE ORAL EVERY MORNING
Qty: 30 CAPSULE | Refills: 0 | Status: SHIPPED | OUTPATIENT
Start: 2023-01-30 | End: 2023-02-28 | Stop reason: SDUPTHER

## 2023-02-13 ENCOUNTER — TELEPHONE (OUTPATIENT)
Dept: SLEEP MEDICINE | Facility: OTHER | Age: 48
End: 2023-02-13
Payer: MEDICAID

## 2023-02-13 NOTE — TELEPHONE ENCOUNTER
Patient no showed for the home sleep study. Sent out a message through my chart to reschedule,no response.

## 2023-03-17 ENCOUNTER — PATIENT MESSAGE (OUTPATIENT)
Dept: RESEARCH | Facility: HOSPITAL | Age: 48
End: 2023-03-17
Payer: MEDICAID

## 2023-04-13 ENCOUNTER — PATIENT MESSAGE (OUTPATIENT)
Dept: PSYCHIATRY | Facility: CLINIC | Age: 48
End: 2023-04-13
Payer: MEDICAID

## 2023-04-19 ENCOUNTER — PATIENT MESSAGE (OUTPATIENT)
Dept: PSYCHIATRY | Facility: CLINIC | Age: 48
End: 2023-04-19
Payer: MEDICAID

## 2023-04-21 ENCOUNTER — PATIENT MESSAGE (OUTPATIENT)
Dept: PSYCHIATRY | Facility: CLINIC | Age: 48
End: 2023-04-21
Payer: MEDICAID
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
## 2023-05-01 ENCOUNTER — OFFICE VISIT (OUTPATIENT)
Dept: PSYCHIATRY | Facility: CLINIC | Age: 48
End: 2023-05-01
Payer: MEDICAID

## 2023-05-01 VITALS — WEIGHT: 194 LBS | HEART RATE: 83 BPM | BODY MASS INDEX: 30.39 KG/M2

## 2023-05-01 DIAGNOSIS — F90.2 ADHD (ATTENTION DEFICIT HYPERACTIVITY DISORDER), COMBINED TYPE: Primary | ICD-10-CM

## 2023-05-01 DIAGNOSIS — F51.01 PRIMARY INSOMNIA: ICD-10-CM

## 2023-05-01 PROCEDURE — 99214 OFFICE O/P EST MOD 30 MIN: CPT | Mod: 95,,, | Performed by: PSYCHIATRY & NEUROLOGY

## 2023-05-01 PROCEDURE — 99214 PR OFFICE/OUTPT VISIT, EST, LEVL IV, 30-39 MIN: ICD-10-PCS | Mod: 95,,, | Performed by: PSYCHIATRY & NEUROLOGY

## 2023-05-01 RX ORDER — LISDEXAMFETAMINE DIMESYLATE 50 MG/1
50 CAPSULE ORAL EVERY MORNING
Qty: 30 CAPSULE | Refills: 0 | Status: SHIPPED | OUTPATIENT
Start: 2023-06-24 | End: 2023-06-20

## 2023-05-01 RX ORDER — LISDEXAMFETAMINE DIMESYLATE 50 MG/1
50 CAPSULE ORAL EVERY MORNING
Qty: 30 CAPSULE | Refills: 0 | Status: SHIPPED | OUTPATIENT
Start: 2023-05-24 | End: 2023-05-03

## 2023-05-01 RX ORDER — TRAZODONE HYDROCHLORIDE 50 MG/1
50 TABLET ORAL NIGHTLY
Qty: 30 TABLET | Refills: 4 | Status: SHIPPED | OUTPATIENT
Start: 2023-05-01

## 2023-05-01 RX ORDER — LISDEXAMFETAMINE DIMESYLATE 50 MG/1
50 CAPSULE ORAL EVERY MORNING
Qty: 30 CAPSULE | Refills: 0 | Status: SHIPPED | OUTPATIENT
Start: 2023-07-24 | End: 2023-06-20

## 2023-05-01 NOTE — PATIENT INSTRUCTIONS
Send Dr. Marks most recent UDS and blood pressure reading.  Continue Vyvanse 50mg daily.  Change remeron to trazodone 50mg or 100mg nightly.  Make appt with Dr. Machuca.    Make appt with new psychiatrist. Psychotherapy: [phone].
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
## 2023-05-02 ENCOUNTER — PATIENT MESSAGE (OUTPATIENT)
Dept: PSYCHIATRY | Facility: CLINIC | Age: 48
End: 2023-05-02
Payer: MEDICAID

## 2023-05-03 DIAGNOSIS — F90.2 ADHD (ATTENTION DEFICIT HYPERACTIVITY DISORDER), COMBINED TYPE: ICD-10-CM

## 2023-05-03 RX ORDER — LISDEXAMFETAMINE DIMESYLATE 50 MG/1
50 CAPSULE ORAL EVERY MORNING
Qty: 30 CAPSULE | Refills: 0 | Status: SHIPPED | OUTPATIENT
Start: 2023-05-20 | End: 2023-06-20

## 2023-06-02 ENCOUNTER — PATIENT MESSAGE (OUTPATIENT)
Dept: ADMINISTRATIVE | Facility: HOSPITAL | Age: 48
End: 2023-06-02
Payer: MEDICAID
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
## 2023-06-20 ENCOUNTER — OFFICE VISIT (OUTPATIENT)
Dept: PSYCHIATRY | Facility: CLINIC | Age: 48
End: 2023-06-20
Payer: MEDICAID

## 2023-06-20 VITALS — BODY MASS INDEX: 23.02 KG/M2 | HEART RATE: 89 BPM | WEIGHT: 147 LBS

## 2023-06-20 DIAGNOSIS — F33.1 MODERATE EPISODE OF RECURRENT MAJOR DEPRESSIVE DISORDER: ICD-10-CM

## 2023-06-20 DIAGNOSIS — F39 UNSPECIFIED MOOD (AFFECTIVE) DISORDER: Primary | ICD-10-CM

## 2023-06-20 PROCEDURE — 3008F PR BODY MASS INDEX (BMI) DOCUMENTED: ICD-10-PCS | Mod: CPTII,95,, | Performed by: PSYCHIATRY & NEUROLOGY

## 2023-06-20 PROCEDURE — 99214 OFFICE O/P EST MOD 30 MIN: CPT | Mod: AF,HB,95, | Performed by: PSYCHIATRY & NEUROLOGY

## 2023-06-20 PROCEDURE — 1159F PR MEDICATION LIST DOCUMENTED IN MEDICAL RECORD: ICD-10-PCS | Mod: CPTII,95,, | Performed by: PSYCHIATRY & NEUROLOGY

## 2023-06-20 PROCEDURE — 1160F PR REVIEW ALL MEDS BY PRESCRIBER/CLIN PHARMACIST DOCUMENTED: ICD-10-PCS | Mod: CPTII,95,, | Performed by: PSYCHIATRY & NEUROLOGY

## 2023-06-20 PROCEDURE — 3008F BODY MASS INDEX DOCD: CPT | Mod: CPTII,95,, | Performed by: PSYCHIATRY & NEUROLOGY

## 2023-06-20 PROCEDURE — 1159F MED LIST DOCD IN RCRD: CPT | Mod: CPTII,95,, | Performed by: PSYCHIATRY & NEUROLOGY

## 2023-06-20 PROCEDURE — 1160F RVW MEDS BY RX/DR IN RCRD: CPT | Mod: CPTII,95,, | Performed by: PSYCHIATRY & NEUROLOGY

## 2023-06-20 PROCEDURE — 99214 PR OFFICE/OUTPT VISIT, EST, LEVL IV, 30-39 MIN: ICD-10-PCS | Mod: AF,HB,95, | Performed by: PSYCHIATRY & NEUROLOGY

## 2023-06-20 RX ORDER — ARIPIPRAZOLE 20 MG/1
20 TABLET ORAL DAILY
Qty: 30 TABLET | Refills: 11 | Status: SHIPPED | OUTPATIENT
Start: 2023-06-20

## 2023-06-20 NOTE — PATIENT INSTRUCTIONS
Start abilify 10mg nightly, 20 minutes after food. After one week increase to 20mg nightly. Message Dr. Marks before July 3rd about response.    Dr. Marks to find psychiatrist with openings in June.

## 2023-06-21 ENCOUNTER — PATIENT MESSAGE (OUTPATIENT)
Dept: PSYCHIATRY | Facility: CLINIC | Age: 48
End: 2023-06-21
Payer: MEDICAID

## 2024-02-09 NOTE — PATIENT INSTRUCTIONS
"You have been provided with a certain amount of medication with a specified number of refills.  Please follow up within an adequate time before you run out of medications.    REFILLS FOR CONTROLLED SUBSTANCES WILL NOT BE GIVEN WITHOUT AN APPOINTMENT.  I will not honor or fill automated refill requests from pharmacies.  You must come in for an appointment to get refills.    Please book your next appointment for myself or therapist by phone by calling our office at [phone].      PLEASE BE AT LEAST 15 MINUTES EARLY FOR YOUR NEXT APPOINTMENT.  PLEASE, DO NOT BE LATE OR YOU WILL BE TURNED AWAY AND ASKED TO RESCHEDULE.  YOU MUST COME EARLY TO ALLOW TIME FOR CHECK-IN AS WELL AS GET YOUR VITAL SIGNS AND GO OVER YOUR MEDICATIONS.  [employer] is not fair to the patients who present after you and are on time for their appointments.  It causes a delay in the appointments for patients and staff.  IF YOU ARE LATE, THERE IS A POSSIBILITY THAT YOU WILL BE CHARGED FOR THE APPOINTMENT TIME PERSONALLY AND IT WILL NOT GO TO YOUR INSURANCE.  YOU MAY ALSO BE DISCHARGED FROM CLINIC with multiple "No Show" appointments.  -----------------------------------------------------------------------------------------------------------------  IF YOU FEEL SUICIDAL OR HAVING THOUGHTS OR PLANS TO HURT YOURSELF OR OTHERS, CALL 911 OR REPORT TO THE NEAREST EMERGENCY ROOM.  YOU CAN ALSO ACCESS THE FOLLOWING HOTLINE(S):    National Suicide Hotline Number 6-917-308-TALK (2438)     ([facility] Mobile Crisis, 174.961.6259'   [place] Crisis Line, (262) 324-7713; [place], 24 hours / 7 days, (423) 366-COPE (7356), 5-886-781-COPE (9524))     TIPS FOR GETTING YOUR PRESCRIPTIONS:    You can always ask your pharmacist the cost of your medications without the use of your insurance. Sometimes the medication will be cheaper if you do not use your insurance.     If you decide you want to have your prescriptions filled at " [facility] Selected a different pharmacy, you can always go to the new pharmacy of your choice and have them call the pharmacy where your prescription was sent and they can have your prescription transferred to the new pharmacy.

## 2024-07-10 ENCOUNTER — TELEPHONE (OUTPATIENT)
Dept: INTERNAL MEDICINE | Facility: CLINIC | Age: 49
End: 2024-07-10
Payer: MEDICAID

## 2024-07-10 NOTE — TELEPHONE ENCOUNTER
----- Message from Kenia White sent at 7/10/2024 12:31 PM CDT -----  Type : Patient Call          Who Called : Patient          Does the patient know what this is regarding?: Pt is wanting to est care with Dr. 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 Call                Best Call Back Number: [phone]            Additional Information:

## 2024-07-11 NOTE — TELEPHONE ENCOUNTER
"Appointment reminder mailed out today, 07/11/24.     "      Scheduled appointment. Phone number on file not in service.       "  "
